# Patient Record
Sex: MALE | Race: ASIAN | NOT HISPANIC OR LATINO
[De-identification: names, ages, dates, MRNs, and addresses within clinical notes are randomized per-mention and may not be internally consistent; named-entity substitution may affect disease eponyms.]

---

## 2017-08-01 ENCOUNTER — FORM ENCOUNTER (OUTPATIENT)
Age: 52
End: 2017-08-01

## 2017-08-02 ENCOUNTER — APPOINTMENT (OUTPATIENT)
Dept: RHEUMATOLOGY | Facility: CLINIC | Age: 52
End: 2017-08-02
Payer: COMMERCIAL

## 2017-08-02 VITALS
SYSTOLIC BLOOD PRESSURE: 156 MMHG | BODY MASS INDEX: 25.59 KG/M2 | OXYGEN SATURATION: 98 % | DIASTOLIC BLOOD PRESSURE: 99 MMHG | HEART RATE: 74 BPM | HEIGHT: 67.5 IN | WEIGHT: 165 LBS | TEMPERATURE: 98.1 F

## 2017-08-02 DIAGNOSIS — R06.02 SHORTNESS OF BREATH: ICD-10-CM

## 2017-08-02 PROCEDURE — ZZZZZ: CPT

## 2017-08-02 PROCEDURE — 99215 OFFICE O/P EST HI 40 MIN: CPT

## 2017-08-03 ENCOUNTER — FORM ENCOUNTER (OUTPATIENT)
Age: 52
End: 2017-08-03

## 2017-08-04 ENCOUNTER — APPOINTMENT (OUTPATIENT)
Dept: CT IMAGING | Facility: CLINIC | Age: 52
End: 2017-08-04
Payer: COMMERCIAL

## 2017-08-04 ENCOUNTER — OUTPATIENT (OUTPATIENT)
Dept: OUTPATIENT SERVICES | Facility: HOSPITAL | Age: 52
LOS: 1 days | End: 2017-08-04
Payer: MEDICAID

## 2017-08-04 DIAGNOSIS — M31.30 WEGENER'S GRANULOMATOSIS WITHOUT RENAL INVOLVEMENT: ICD-10-CM

## 2017-08-04 LAB
ADJUSTED MITOGEN: >10 IU/ML
ADJUSTED TB AG: 0 IU/ML
ALBUMIN SERPL ELPH-MCNC: 4.4 G/DL
ALP BLD-CCNC: 68 U/L
ALT SERPL-CCNC: 22 U/L
ANION GAP SERPL CALC-SCNC: 15 MMOL/L
APPEARANCE: CLEAR
AST SERPL-CCNC: 25 U/L
BACTERIA: NEGATIVE
BASOPHILS # BLD AUTO: 0.05 K/UL
BASOPHILS NFR BLD AUTO: 0.5 %
BILIRUB SERPL-MCNC: 0.8 MG/DL
BILIRUBIN URINE: NEGATIVE
BLOOD URINE: NEGATIVE
BUN SERPL-MCNC: 21 MG/DL
CALCIUM SERPL-MCNC: 9.2 MG/DL
CD19 CELLS NFR BLD: 332 /UL
CELLS.CD3-CD19+/CELLS IN BLOOD: 13 %
CHLORIDE SERPL-SCNC: 103 MMOL/L
CO2 SERPL-SCNC: 23 MMOL/L
COLOR: YELLOW
CREAT SERPL-MCNC: 1.32 MG/DL
CREAT SPEC-SCNC: 338 MG/DL
CREAT/PROT UR: 0 RATIO
CRP SERPL-MCNC: <0.2 MG/DL
EOSINOPHIL # BLD AUTO: 0.73 K/UL
EOSINOPHIL NFR BLD AUTO: 7.9 %
ERYTHROCYTE [SEDIMENTATION RATE] IN BLOOD BY WESTERGREN METHOD: 19 MM/HR
GLUCOSE QUALITATIVE U: NORMAL MG/DL
GLUCOSE SERPL-MCNC: 87 MG/DL
HBV CORE IGG+IGM SER QL: NONREACTIVE
HBV CORE IGM SER QL: NONREACTIVE
HBV SURFACE AB SER QL: NONREACTIVE
HBV SURFACE AG SER QL: NONREACTIVE
HCT VFR BLD CALC: 45.6 %
HCV AB SER QL: NONREACTIVE
HCV S/CO RATIO: 0.07 S/CO
HGB BLD-MCNC: 15.2 G/DL
HYALINE CASTS: 4 /LPF
IMM GRANULOCYTES NFR BLD AUTO: 0.2 %
KETONES URINE: NEGATIVE
LEUKOCYTE ESTERASE URINE: NEGATIVE
LYMPHOCYTES # BLD AUTO: 2.19 K/UL
LYMPHOCYTES NFR BLD AUTO: 23.7 %
M TB IFN-G BLD-IMP: NEGATIVE
MAN DIFF?: NORMAL
MCHC RBC-ENTMCNC: 30.2 PG
MCHC RBC-ENTMCNC: 33.3 GM/DL
MCV RBC AUTO: 90.7 FL
MICROSCOPIC-UA: NORMAL
MONOCYTES # BLD AUTO: 0.63 K/UL
MONOCYTES NFR BLD AUTO: 6.8 %
MYELOPEROXIDASE AB SER QL IA: <5 UNITS
MYELOPEROXIDASE CELLS FLD QL: NEGATIVE
NEUTROPHILS # BLD AUTO: 5.62 K/UL
NEUTROPHILS NFR BLD AUTO: 60.9 %
NITRITE URINE: NEGATIVE
PH URINE: 6
PLATELET # BLD AUTO: 265 K/UL
POTASSIUM SERPL-SCNC: 4.3 MMOL/L
PROT SERPL-MCNC: 7.4 G/DL
PROT UR-MCNC: 13 MG/DL
PROTEIN URINE: ABNORMAL MG/DL
PROTEINASE3 AB SER IA-ACNC: 40.7 UNITS
PROTEINASE3 AB SER-ACNC: POSITIVE
QUANTIFERON GOLD NIL: 0.02 IU/ML
RBC # BLD: 5.03 M/UL
RBC # FLD: 13.5 %
RED BLOOD CELLS URINE: 3 /HPF
SODIUM SERPL-SCNC: 141 MMOL/L
SPECIFIC GRAVITY URINE: 1.02
SQUAMOUS EPITHELIAL CELLS: 2 /HPF
UROBILINOGEN URINE: NORMAL MG/DL
WBC # FLD AUTO: 9.24 K/UL
WHITE BLOOD CELLS URINE: 2 /HPF

## 2017-08-04 PROCEDURE — 82565 ASSAY OF CREATININE: CPT

## 2017-08-04 PROCEDURE — 71275 CT ANGIOGRAPHY CHEST: CPT | Mod: 26

## 2017-08-04 PROCEDURE — 71275 CT ANGIOGRAPHY CHEST: CPT

## 2017-09-15 ENCOUNTER — APPOINTMENT (OUTPATIENT)
Dept: NEUROLOGY | Facility: CLINIC | Age: 52
End: 2017-09-15
Payer: COMMERCIAL

## 2017-09-15 VITALS
HEIGHT: 67 IN | DIASTOLIC BLOOD PRESSURE: 81 MMHG | BODY MASS INDEX: 25.9 KG/M2 | SYSTOLIC BLOOD PRESSURE: 122 MMHG | HEART RATE: 80 BPM | WEIGHT: 165 LBS

## 2017-09-15 DIAGNOSIS — G62.9 POLYNEUROPATHY, UNSPECIFIED: ICD-10-CM

## 2017-09-15 PROCEDURE — 99204 OFFICE O/P NEW MOD 45 MIN: CPT

## 2017-09-19 ENCOUNTER — APPOINTMENT (OUTPATIENT)
Dept: NEUROLOGY | Facility: CLINIC | Age: 52
End: 2017-09-19
Payer: MEDICAID

## 2017-09-19 PROCEDURE — 95910 NRV CNDJ TEST 7-8 STUDIES: CPT

## 2017-09-19 PROCEDURE — 95886 MUSC TEST DONE W/N TEST COMP: CPT

## 2017-10-16 ENCOUNTER — APPOINTMENT (OUTPATIENT)
Dept: RHEUMATOLOGY | Facility: CLINIC | Age: 52
End: 2017-10-16
Payer: MEDICAID

## 2017-10-16 VITALS
WEIGHT: 168 LBS | BODY MASS INDEX: 26.37 KG/M2 | HEART RATE: 67 BPM | DIASTOLIC BLOOD PRESSURE: 78 MMHG | HEIGHT: 67 IN | TEMPERATURE: 98.2 F | OXYGEN SATURATION: 96 % | SYSTOLIC BLOOD PRESSURE: 127 MMHG

## 2017-10-16 DIAGNOSIS — R09.81 NASAL CONGESTION: ICD-10-CM

## 2017-10-16 LAB
ALBUMIN SERPL ELPH-MCNC: 4.4 G/DL
ALP BLD-CCNC: 63 U/L
ALT SERPL-CCNC: 13 U/L
ANION GAP SERPL CALC-SCNC: 13 MMOL/L
AST SERPL-CCNC: 20 U/L
BASOPHILS # BLD AUTO: 0.05 K/UL
BASOPHILS NFR BLD AUTO: 0.4 %
BILIRUB SERPL-MCNC: 0.8 MG/DL
BUN SERPL-MCNC: 20 MG/DL
CALCIUM SERPL-MCNC: 9.4 MG/DL
CHLORIDE SERPL-SCNC: 102 MMOL/L
CO2 SERPL-SCNC: 26 MMOL/L
CREAT SERPL-MCNC: 1.35 MG/DL
CREAT SPEC-SCNC: 189 MG/DL
CREAT/PROT UR: 0 RATIO
CRP SERPL-MCNC: 0.3 MG/DL
EOSINOPHIL # BLD AUTO: 1.24 K/UL
EOSINOPHIL NFR BLD AUTO: 8.7 %
ERYTHROCYTE [SEDIMENTATION RATE] IN BLOOD BY WESTERGREN METHOD: 16 MM/HR
GLUCOSE SERPL-MCNC: 94 MG/DL
HCT VFR BLD CALC: 42.4 %
HGB BLD-MCNC: 14.4 G/DL
IMM GRANULOCYTES NFR BLD AUTO: 0.1 %
LYMPHOCYTES # BLD AUTO: 2.27 K/UL
LYMPHOCYTES NFR BLD AUTO: 15.9 %
MAN DIFF?: NORMAL
MCHC RBC-ENTMCNC: 30.8 PG
MCHC RBC-ENTMCNC: 34 GM/DL
MCV RBC AUTO: 90.8 FL
MONOCYTES # BLD AUTO: 0.45 K/UL
MONOCYTES NFR BLD AUTO: 3.2 %
NEUTROPHILS # BLD AUTO: 10.22 K/UL
NEUTROPHILS NFR BLD AUTO: 71.7 %
PLATELET # BLD AUTO: 247 K/UL
POTASSIUM SERPL-SCNC: 4.5 MMOL/L
PROT SERPL-MCNC: 7.5 G/DL
PROT UR-MCNC: 6 MG/DL
RBC # BLD: 4.67 M/UL
RBC # FLD: 12.8 %
SODIUM SERPL-SCNC: 141 MMOL/L
WBC # FLD AUTO: 14.25 K/UL

## 2017-10-16 PROCEDURE — 90686 IIV4 VACC NO PRSV 0.5 ML IM: CPT

## 2017-10-16 PROCEDURE — G0008: CPT

## 2017-10-16 PROCEDURE — 99215 OFFICE O/P EST HI 40 MIN: CPT | Mod: 25

## 2017-10-16 RX ORDER — METOPROLOL TARTRATE 25 MG/1
25 TABLET, FILM COATED ORAL
Refills: 0 | Status: ACTIVE | COMMUNITY

## 2017-10-16 RX ORDER — LISINOPRIL 5 MG/1
5 TABLET ORAL
Refills: 0 | Status: ACTIVE | COMMUNITY

## 2017-10-19 ENCOUNTER — FORM ENCOUNTER (OUTPATIENT)
Age: 52
End: 2017-10-19

## 2017-10-19 LAB
APPEARANCE: CLEAR
BACTERIA: NEGATIVE
BILIRUBIN URINE: NEGATIVE
BLOOD URINE: NEGATIVE
COLOR: YELLOW
GLUCOSE QUALITATIVE U: NEGATIVE MG/DL
KETONES URINE: NEGATIVE
LEUKOCYTE ESTERASE URINE: NEGATIVE
MICROSCOPIC-UA: NORMAL
MYELOPEROXIDASE AB SER QL IA: <5 UNITS
MYELOPEROXIDASE CELLS FLD QL: NEGATIVE
NITRITE URINE: NEGATIVE
PH URINE: 5.5
PROTEIN URINE: NEGATIVE MG/DL
PROTEINASE3 AB SER IA-ACNC: 39 UNITS
PROTEINASE3 AB SER-ACNC: POSITIVE
RED BLOOD CELLS URINE: 0 /HPF
SPECIFIC GRAVITY URINE: 1.02
SQUAMOUS EPITHELIAL CELLS: 0 /HPF
UROBILINOGEN URINE: NEGATIVE MG/DL
WHITE BLOOD CELLS URINE: 0 /HPF

## 2017-10-20 ENCOUNTER — APPOINTMENT (OUTPATIENT)
Dept: MRI IMAGING | Facility: CLINIC | Age: 52
End: 2017-10-20

## 2017-10-20 ENCOUNTER — OUTPATIENT (OUTPATIENT)
Dept: OUTPATIENT SERVICES | Facility: HOSPITAL | Age: 52
LOS: 1 days | End: 2017-10-20
Payer: MEDICAID

## 2017-10-20 DIAGNOSIS — Z00.8 ENCOUNTER FOR OTHER GENERAL EXAMINATION: ICD-10-CM

## 2017-10-20 PROCEDURE — 70553 MRI BRAIN STEM W/O & W/DYE: CPT | Mod: 26

## 2017-10-20 PROCEDURE — 70543 MRI ORBT/FAC/NCK W/O &W/DYE: CPT

## 2017-10-20 PROCEDURE — 70553 MRI BRAIN STEM W/O & W/DYE: CPT

## 2017-10-20 PROCEDURE — 70543 MRI ORBT/FAC/NCK W/O &W/DYE: CPT | Mod: 26

## 2017-10-20 PROCEDURE — A9585: CPT

## 2017-10-24 ENCOUNTER — APPOINTMENT (OUTPATIENT)
Dept: RHEUMATOLOGY | Facility: CLINIC | Age: 52
End: 2017-10-24
Payer: MEDICAID

## 2017-10-24 VITALS
WEIGHT: 170 LBS | HEART RATE: 63 BPM | SYSTOLIC BLOOD PRESSURE: 139 MMHG | DIASTOLIC BLOOD PRESSURE: 85 MMHG | BODY MASS INDEX: 26.68 KG/M2 | TEMPERATURE: 98.3 F | HEIGHT: 67 IN | OXYGEN SATURATION: 98 %

## 2017-10-24 PROCEDURE — 99215 OFFICE O/P EST HI 40 MIN: CPT

## 2017-10-25 RX ORDER — RITUXIMAB 10 MG/ML
500 INJECTION, SOLUTION INTRAVENOUS
Qty: 1000 | Refills: 1 | Status: ACTIVE | OUTPATIENT
Start: 2017-10-24

## 2017-11-02 ENCOUNTER — APPOINTMENT (OUTPATIENT)
Dept: CV DIAGNOSITCS | Facility: HOSPITAL | Age: 52
End: 2017-11-02
Payer: MEDICAID

## 2017-11-02 ENCOUNTER — OUTPATIENT (OUTPATIENT)
Dept: OUTPATIENT SERVICES | Facility: HOSPITAL | Age: 52
LOS: 1 days | End: 2017-11-02

## 2017-11-02 DIAGNOSIS — I35.1 NONRHEUMATIC AORTIC (VALVE) INSUFFICIENCY: ICD-10-CM

## 2017-11-02 PROCEDURE — 93306 TTE W/DOPPLER COMPLETE: CPT | Mod: 26

## 2017-11-02 PROCEDURE — 93312 ECHO TRANSESOPHAGEAL: CPT | Mod: 26

## 2017-11-02 PROCEDURE — 76376 3D RENDER W/INTRP POSTPROCES: CPT | Mod: 26

## 2017-11-27 ENCOUNTER — APPOINTMENT (OUTPATIENT)
Dept: NEUROLOGY | Facility: CLINIC | Age: 52
End: 2017-11-27

## 2017-11-28 ENCOUNTER — APPOINTMENT (OUTPATIENT)
Dept: RHEUMATOLOGY | Facility: CLINIC | Age: 52
End: 2017-11-28
Payer: MEDICAID

## 2017-11-28 VITALS
TEMPERATURE: 97.9 F | OXYGEN SATURATION: 98 % | HEART RATE: 66 BPM | DIASTOLIC BLOOD PRESSURE: 79 MMHG | BODY MASS INDEX: 26.84 KG/M2 | SYSTOLIC BLOOD PRESSURE: 126 MMHG | HEIGHT: 67 IN | WEIGHT: 171 LBS

## 2017-11-28 PROCEDURE — 99215 OFFICE O/P EST HI 40 MIN: CPT

## 2017-11-28 RX ORDER — CLOPIDOGREL BISULFATE 75 MG/1
75 TABLET, FILM COATED ORAL
Qty: 30 | Refills: 1 | Status: ACTIVE | COMMUNITY
Start: 2017-11-28 | End: 1900-01-01

## 2017-12-04 ENCOUNTER — APPOINTMENT (OUTPATIENT)
Dept: RHEUMATOLOGY | Facility: CLINIC | Age: 52
End: 2017-12-04

## 2017-12-07 ENCOUNTER — LABORATORY RESULT (OUTPATIENT)
Age: 52
End: 2017-12-07

## 2017-12-07 ENCOUNTER — APPOINTMENT (OUTPATIENT)
Dept: RHEUMATOLOGY | Facility: CLINIC | Age: 52
End: 2017-12-07
Payer: MEDICAID

## 2017-12-07 PROCEDURE — 96375 TX/PRO/DX INJ NEW DRUG ADDON: CPT

## 2017-12-07 PROCEDURE — 96415 CHEMO IV INFUSION ADDL HR: CPT

## 2017-12-07 PROCEDURE — 96413 CHEMO IV INFUSION 1 HR: CPT

## 2017-12-07 PROCEDURE — 36415 COLL VENOUS BLD VENIPUNCTURE: CPT

## 2017-12-19 ENCOUNTER — LABORATORY RESULT (OUTPATIENT)
Age: 52
End: 2017-12-19

## 2017-12-19 ENCOUNTER — RX RENEWAL (OUTPATIENT)
Age: 52
End: 2017-12-19

## 2017-12-20 ENCOUNTER — APPOINTMENT (OUTPATIENT)
Dept: RHEUMATOLOGY | Facility: CLINIC | Age: 52
End: 2017-12-20
Payer: MEDICAID

## 2017-12-20 PROCEDURE — 36415 COLL VENOUS BLD VENIPUNCTURE: CPT

## 2017-12-20 PROCEDURE — 96415 CHEMO IV INFUSION ADDL HR: CPT

## 2017-12-20 PROCEDURE — 96413 CHEMO IV INFUSION 1 HR: CPT

## 2018-01-10 ENCOUNTER — APPOINTMENT (OUTPATIENT)
Dept: RHEUMATOLOGY | Facility: CLINIC | Age: 53
End: 2018-01-10

## 2018-01-24 ENCOUNTER — APPOINTMENT (OUTPATIENT)
Dept: RHEUMATOLOGY | Facility: CLINIC | Age: 53
End: 2018-01-24

## 2018-01-24 ENCOUNTER — RX RENEWAL (OUTPATIENT)
Age: 53
End: 2018-01-24

## 2018-01-24 RX ORDER — CALCIUM CARBONATE/VITAMIN D3 500MG-5MCG
500-5 TABLET ORAL
Qty: 180 | Refills: 0 | Status: ACTIVE | COMMUNITY
Start: 2017-12-19 | End: 1900-01-01

## 2018-01-30 ENCOUNTER — APPOINTMENT (OUTPATIENT)
Dept: RHEUMATOLOGY | Facility: CLINIC | Age: 53
End: 2018-01-30
Payer: MEDICAID

## 2018-01-30 VITALS
HEIGHT: 67 IN | TEMPERATURE: 97.7 F | OXYGEN SATURATION: 98 % | HEART RATE: 64 BPM | WEIGHT: 175 LBS | BODY MASS INDEX: 27.47 KG/M2 | SYSTOLIC BLOOD PRESSURE: 137 MMHG | DIASTOLIC BLOOD PRESSURE: 91 MMHG

## 2018-01-30 DIAGNOSIS — Z51.81 ENCOUNTER FOR THERAPEUTIC DRUG LVL MONITORING: ICD-10-CM

## 2018-01-30 DIAGNOSIS — M31.30 WEGENER'S GRANULOMATOSIS W/OUT RENAL INVOLVEMENT: ICD-10-CM

## 2018-01-30 DIAGNOSIS — J32.9 CHRONIC SINUSITIS, UNSPECIFIED: ICD-10-CM

## 2018-01-30 DIAGNOSIS — Z79.899 ENCOUNTER FOR THERAPEUTIC DRUG LVL MONITORING: ICD-10-CM

## 2018-01-30 DIAGNOSIS — M21.371 FOOT DROP, RIGHT FOOT: ICD-10-CM

## 2018-01-30 DIAGNOSIS — Z79.899 OTHER LONG TERM (CURRENT) DRUG THERAPY: ICD-10-CM

## 2018-01-30 PROCEDURE — 99214 OFFICE O/P EST MOD 30 MIN: CPT | Mod: GC

## 2018-01-31 PROBLEM — M21.371 RIGHT FOOT DROP: Status: ACTIVE | Noted: 2017-08-02

## 2018-01-31 LAB
ALBUMIN SERPL ELPH-MCNC: 4.5 G/DL
ALP BLD-CCNC: 53 U/L
ALT SERPL-CCNC: 16 U/L
ANION GAP SERPL CALC-SCNC: 14 MMOL/L
AST SERPL-CCNC: 21 U/L
BASOPHILS # BLD AUTO: 0.06 K/UL
BASOPHILS NFR BLD AUTO: 0.8 %
BILIRUB SERPL-MCNC: 0.6 MG/DL
BUN SERPL-MCNC: 18 MG/DL
CALCIUM SERPL-MCNC: 9.4 MG/DL
CD19 CELLS NFR BLD: 0 /UL
CELLS.CD3-CD19+/CELLS IN BLOOD: 0 %
CHLORIDE SERPL-SCNC: 100 MMOL/L
CO2 SERPL-SCNC: 26 MMOL/L
CREAT SERPL-MCNC: 1.24 MG/DL
CRP SERPL-MCNC: <0.2 MG/DL
DEPRECATED KAPPA LC FREE/LAMBDA SER: 0.86 RATIO
EOSINOPHIL # BLD AUTO: 0.41 K/UL
EOSINOPHIL NFR BLD AUTO: 5.2 %
ERYTHROCYTE [SEDIMENTATION RATE] IN BLOOD BY WESTERGREN METHOD: 9 MM/HR
GLUCOSE SERPL-MCNC: 100 MG/DL
HCT VFR BLD CALC: 43.6 %
HGB BLD-MCNC: 14.9 G/DL
IGA SER QL IEP: 235 MG/DL
IGG SER QL IEP: 1300 MG/DL
IGM SER QL IEP: 39 MG/DL
IMM GRANULOCYTES NFR BLD AUTO: 0.1 %
KAPPA LC CSF-MCNC: 1.23 MG/DL
KAPPA LC SERPL-MCNC: 1.06 MG/DL
LYMPHOCYTES # BLD AUTO: 2.08 K/UL
LYMPHOCYTES NFR BLD AUTO: 26.4 %
MAN DIFF?: NORMAL
MCHC RBC-ENTMCNC: 31.3 PG
MCHC RBC-ENTMCNC: 34.2 GM/DL
MCV RBC AUTO: 91.6 FL
MONOCYTES # BLD AUTO: 0.65 K/UL
MONOCYTES NFR BLD AUTO: 8.2 %
MYELOPEROXIDASE AB SER QL IA: <5 UNITS
MYELOPEROXIDASE CELLS FLD QL: NEGATIVE
NEUTROPHILS # BLD AUTO: 4.67 K/UL
NEUTROPHILS NFR BLD AUTO: 59.3 %
PLATELET # BLD AUTO: 229 K/UL
POTASSIUM SERPL-SCNC: 4.7 MMOL/L
PROT SERPL-MCNC: 7.5 G/DL
PROTEINASE3 AB SER IA-ACNC: 43 UNITS
PROTEINASE3 AB SER-ACNC: POSITIVE
RBC # BLD: 4.76 M/UL
RBC # FLD: 13.8 %
SODIUM SERPL-SCNC: 140 MMOL/L
WBC # FLD AUTO: 7.88 K/UL

## 2021-05-04 ENCOUNTER — TRANSCRIPTION ENCOUNTER (OUTPATIENT)
Age: 56
End: 2021-05-04

## 2023-05-23 ENCOUNTER — HOSPITAL ENCOUNTER (EMERGENCY)
Facility: HOSPITAL | Age: 58
Discharge: HOME | End: 2023-05-23
Attending: EMERGENCY MEDICINE
Payer: COMMERCIAL

## 2023-05-23 VITALS
HEART RATE: 100 BPM | OXYGEN SATURATION: 94 % | RESPIRATION RATE: 18 BRPM | SYSTOLIC BLOOD PRESSURE: 170 MMHG | WEIGHT: 182 LBS | DIASTOLIC BLOOD PRESSURE: 101 MMHG | BODY MASS INDEX: 28.56 KG/M2 | HEIGHT: 67 IN | TEMPERATURE: 98.1 F

## 2023-05-23 DIAGNOSIS — L03.011 FELON OF FINGER OF RIGHT HAND: Primary | ICD-10-CM

## 2023-05-23 PROCEDURE — 10061 I&D ABSCESS COMP/MULTIPLE: CPT | Performed by: EMERGENCY MEDICINE

## 2023-05-23 PROCEDURE — 99282 EMERGENCY DEPT VISIT SF MDM: CPT

## 2023-05-23 PROCEDURE — 99284 EMERGENCY DEPT VISIT MOD MDM: CPT | Performed by: EMERGENCY MEDICINE

## 2023-05-23 RX ORDER — CEPHALEXIN 500 MG/1
500 CAPSULE ORAL EVERY 6 HOURS SCHEDULED
Qty: 28 CAPSULE | Refills: 0 | Status: SHIPPED | OUTPATIENT
Start: 2023-05-23 | End: 2023-05-30

## 2023-05-23 RX ORDER — LIDOCAINE HYDROCHLORIDE 10 MG/ML
10 INJECTION, SOLUTION EPIDURAL; INFILTRATION; INTRACAUDAL; PERINEURAL ONCE
Status: COMPLETED | OUTPATIENT
Start: 2023-05-23 | End: 2023-05-23

## 2023-05-23 RX ORDER — CEPHALEXIN 250 MG/1
500 CAPSULE ORAL ONCE
Status: COMPLETED | OUTPATIENT
Start: 2023-05-23 | End: 2023-05-23

## 2023-05-23 RX ADMIN — CEPHALEXIN 500 MG: 250 CAPSULE ORAL at 21:39

## 2023-05-23 RX ADMIN — LIDOCAINE HYDROCHLORIDE 10 ML: 10 INJECTION, SOLUTION EPIDURAL; INFILTRATION; INTRACAUDAL at 20:08

## 2023-05-24 ENCOUNTER — TELEPHONE (OUTPATIENT)
Dept: OBGYN CLINIC | Facility: CLINIC | Age: 58
End: 2023-05-24

## 2023-05-24 NOTE — TELEPHONE ENCOUNTER
Hello,  Please advise if the following patient can be forced onto the schedule:      Call back #: [phone]    Insurance: Self pay (aware of self pay policy)     Reason for appointment: infection of finger right thumb     Requested doctor/location: Dr Romero @ [facility]       Thank you

## 2023-05-24 NOTE — DISCHARGE INSTRUCTIONS
You were seen today for an infection of the tip of your finger called kendal miner  The infected material was drained in the emergency department today  It is very important that your take your antibiotics as prescribed, soak the finger in arm water 15-20 min at least once daily, and follow up promptly with a hand specialist (using the information provided you today) to prevent worsening infection of the finger/hand    Please seek care  immediately if your symptoms worsen despite the above treatment

## 2023-05-25 ENCOUNTER — HOSPITAL ENCOUNTER (OUTPATIENT)
Dept: RADIOLOGY | Facility: HOSPITAL | Age: 58
End: 2023-05-25
Attending: STUDENT IN AN ORGANIZED HEALTH CARE EDUCATION/TRAINING PROGRAM

## 2023-05-25 ENCOUNTER — OFFICE VISIT (OUTPATIENT)
Dept: OBGYN CLINIC | Facility: CLINIC | Age: 58
End: 2023-05-25

## 2023-05-25 VITALS
HEIGHT: 67 IN | SYSTOLIC BLOOD PRESSURE: 146 MMHG | DIASTOLIC BLOOD PRESSURE: 96 MMHG | WEIGHT: 180.4 LBS | BODY MASS INDEX: 28.31 KG/M2 | HEART RATE: 79 BPM

## 2023-05-25 DIAGNOSIS — M10.9 GOUT OF RIGHT HAND, UNSPECIFIED CAUSE, UNSPECIFIED CHRONICITY: Primary | ICD-10-CM

## 2023-05-25 DIAGNOSIS — M79.641 RIGHT HAND PAIN: ICD-10-CM

## 2023-05-25 RX ORDER — IBUPROFEN 800 MG/1
TABLET ORAL EVERY 6 HOURS PRN
COMMUNITY

## 2023-05-25 RX ORDER — COLCHICINE 0.6 MG/1
0.6 TABLET ORAL DAILY
Qty: 14 TABLET | Refills: 0 | Status: SHIPPED | OUTPATIENT
Start: 2023-05-25

## 2023-05-25 NOTE — PROGRESS NOTES
ORTHOPAEDIC HAND, WRIST, AND ELBOW OFFICE  VISIT       ASSESSMENT/PLAN:      Diagnoses and all orders for this visit:    Gout of right hand, unspecified cause, unspecified chronicity  -     XR thumb right first digit-min 2v; Future  -     Wound culture and Gram stain  -     Incision and drain  -     colchicine (COLCRYS) 0 6 mg tablet; Take 1 tablet (0 6 mg total) by mouth daily    Other orders  -     ibuprofen (MOTRIN) 800 mg tablet; Take by mouth every 6 (six) hours as needed for mild pain        81-year-old male with right thumb suspected gout    · Digital block was performed in the office today and the thumb was milked of some fluid and a culture was taken as precaution  · X-rays were reviewed in the office today  · A 2 week prescription was provided for Colchicine however, the patient was advised this medication will need to be managed further by his PCP or a Rheumatologist    · Clinical images were taken and loaded into the pt's chart  · The patient was instructed to complete the antibiotics as prescribed  · He was instructed to perform hand soaks daily  The patient verbalized understanding of exam findings and treatment plan  We engaged in the shared decision-making process and treatment options were discussed at length with the patient  Surgical and conservative management discussed today along with risks and benefits  Follow Up:  1 week       To Do Next Visit:  Re-evaluation of current issue      Carli Amaya MD  Attending, Orthopaedic Surgery  Hand, Wrist, and Elbow Surgery  [address] [place] [address]    ____________________________________________________________________________________________________________________________________________      CHIEF COMPLAINT:  Chief Complaint   Patient presents with   • Right Thumb - Pain       SUBJECTIVE:  Patient is a 63 y o  RHD male who presents today for evaluation and treatment of right thumb pain   The patient states appx 4 weeks ago he developed pain redness and swelling about the thumb  He states he then noticed a white pump which he stuck a needle in and drained  He noted thick white substance that reminded him of tooth paste  Clinical image was reviewed on patient's phone  He states over the past two weeks the pain has been increased  The patient went to the ED on 5/23/23 where I&D was performed  He was discharged on Keflex  He states the finger continues to drain thick white substance  The patient has a history of gout and does have Colchicine however, the patient states he does not have insurance and does not have a PCP and this medication is old  He hs been performing warm soaks and Ibuprofen  He discontinued the Ibuprofen due to GI upset  I have personally reviewed all the relevant PMH, PSH, SH, FH, Medications and allergies      PAST MEDICAL HISTORY:  History reviewed  No pertinent past medical history  PAST SURGICAL HISTORY:  History reviewed  No pertinent surgical history  FAMILY HISTORY:  History reviewed  No pertinent family history  SOCIAL HISTORY:  Social History     Tobacco Use   • Smoking status: Every Day     Packs/day: 0 25     Types: Cigarettes   • Smokeless tobacco: Never   Vaping Use   • Vaping Use: Never used   Substance Use Topics   • Alcohol use: Never   • Drug use: Never       MEDICATIONS:    Current Outpatient Medications:   •  cephalexin (KEFLEX) 500 mg capsule, Take 1 capsule (500 mg total) by mouth every 6 (six) hours for 7 days, Disp: 28 capsule, Rfl: 0  •  ibuprofen (MOTRIN) 800 mg tablet, Take by mouth every 6 (six) hours as needed for mild pain, Disp: , Rfl:   •  colchicine (COLCRYS) 0 6 mg tablet, Take 1 tablet (0 6 mg total) by mouth daily, Disp: 14 tablet, Rfl: 0    ALLERGIES:  No Known Allergies        REVIEW OF SYSTEMS:  Musculoskeletal:        As noted in HPI  All other systems reviewed and are negative      VITALS:  Vitals:    05/25/23 0717   BP: 146/96   Pulse: 79       LABS:  HgA1c: No "results found for: \"HGBA1C\"  BMP: No results found for: \"BUN\", \"CALCIUM\", \"CL\", \"CO2\", \"CREATININE\", \"GLUCOSE\", \"K\", \"NA\"    _____________________________________________________  PHYSICAL EXAMINATION:  General: well developed and well nourished, alert, oriented times 3 and appears comfortable  Psychiatric: Normal  HEENT: Normocephalic, Atraumatic Trachea Midline, No torticollis  Pulmonary: No audible wheezing or respiratory distress   Abdomen/GI: Non tender, non distended   Cardiovascular: No pitting edema, 2+ radial pulse   Skin: No Masses, No Fluctuation, No Ulcerations  Neurovascular: Sensation Intact to the Median, Ulnar, Radial Nerve, Motor Intact to the Median, Ulnar, Radial Nerve and Pulses Intact  Musculoskeletal: Normal, except as noted in detailed exam and in HPI  MUSCULOSKELETAL EXAMINATION:  Right thumb  6 mm incision over ulnar aspect of thumb pulp, thick chalky white substance able to be expressed  Mild hyperemia to thumb tip  Moving thumb well      ___________________________________________________  STUDIES REVIEWED:  Xrays of the right thumb were reviewed in PACS by Dr Fidel Paredes and demonstrate no osseous abnormalities  PROCEDURES PERFORMED:  Nerve block    Date/Time: 5/25/2023 7:00 AM    Performed by: Merari Evangelista MD  Authorized by: Merari Evangelista MD  [place] Protocol:  Procedure performed by:  Risks and benefits: risks, benefits and alternatives were discussed  Consent given by: patient  Time out: Immediately prior to procedure a \"time out\" was called to verify the correct patient, procedure, equipment, support staff and site/side marked as required      Indications:     Indications:  Pain relief  Location:     Body area:  Upper extremity    Upper extremity nerve:  Digital (thumb)    Laterality:  Right  Pre-procedure details:     Skin preparation:  Alcohol  Skin anesthesia (see MAR for exact dosages):     Skin anesthesia method:  Local infiltration  Procedure details (see " MAR for exact dosages): Anesthetic injected:  Lidocaine 1% w/o epi  Post-procedure details:     Dressing:  Sterile dressing    Patient tolerance of procedure:   Tolerated well, no immediate complications           _____________________________________________________      Scribe Attestation    I,:  Marisel Mcrae MA am acting as a scribe while in the presence of the attending physician :       I,:  Danita Bennett MD personally performed the services described in this documentation    as scribed in my presence :

## 2023-05-28 LAB
BACTERIA WND AEROBE CULT: NO GROWTH
GRAM STN SPEC: NORMAL

## 2023-06-02 ENCOUNTER — OFFICE VISIT (OUTPATIENT)
Dept: OBGYN CLINIC | Facility: CLINIC | Age: 58
End: 2023-06-02

## 2023-06-02 VITALS
BODY MASS INDEX: 27.75 KG/M2 | HEIGHT: 67 IN | SYSTOLIC BLOOD PRESSURE: 138 MMHG | WEIGHT: 176.8 LBS | DIASTOLIC BLOOD PRESSURE: 85 MMHG | HEART RATE: 80 BPM

## 2023-06-02 DIAGNOSIS — M10.9 GOUT OF RIGHT HAND, UNSPECIFIED CAUSE, UNSPECIFIED CHRONICITY: Primary | ICD-10-CM

## 2023-06-02 NOTE — PROGRESS NOTES
ORTHOPAEDIC HAND, WRIST, AND ELBOW OFFICE  VISIT       ASSESSMENT/PLAN:      Diagnoses and all orders for this visit:    Gout of right hand, unspecified cause, unspecified chronicity        63 y o  male with right thumb suspected gout   Cultures reviewed, no growth, no bacteria or polys seen   Advised to obtain a PCP to further manage Colchicine   Clinical pictures were obtained for patients chart   Follow up in 10 days for clinical re-evaluation     The patient verbalized understanding of exam findings and treatment plan  We engaged in the shared decision-making process and treatment options were discussed at length with the patient  Surgical and conservative management discussed today along with risks and benefits  Follow Up:  10 days     To Do Next Visit:  Re-evaluation of current issue      Issac Willis MD  Attending, Orthopaedic Surgery  Hand, Wrist, and Elbow Surgery  [address] [place] [address]    ____________________________________________________________________________________________________________________________________________      CHIEF COMPLAINT:  Chief Complaint   Patient presents with   • Right Thumb - Follow-up       SUBJECTIVE:  Patient is a 63 y o  RHD male who presents today for follow up of right thumb suspected gout  A culture was performed at his last visit which demonstrated no growth, bacteria or polys seen  He completed the oral ABX  He is still taking the Colchicine  He has been soaking the finger  He notes he has not been able to express any fluid from the finger in 2 days  He notes pain has improved, he is now able to sleep  He feels the finger is still swollen  I have personally reviewed all the relevant PMH, PSH, SH, FH, Medications and allergies      PAST MEDICAL HISTORY:  History reviewed  No pertinent past medical history  PAST SURGICAL HISTORY:  History reviewed  No pertinent surgical history  FAMILY HISTORY:  History reviewed   No pertinent family "history  SOCIAL HISTORY:  Social History     Tobacco Use   • Smoking status: Every Day     Packs/day: 0 25     Types: Cigarettes   • Smokeless tobacco: Never   Vaping Use   • Vaping Use: Never used   Substance Use Topics   • Alcohol use: Never   • Drug use: Never       MEDICATIONS:    Current Outpatient Medications:   •  ibuprofen (MOTRIN) 800 mg tablet, Take by mouth every 6 (six) hours as needed for mild pain, Disp: , Rfl:   •  colchicine (COLCRYS) 0 6 mg tablet, Take 1 tablet (0 6 mg total) by mouth daily (Patient not taking: Reported on 6/2/2023), Disp: 14 tablet, Rfl: 0    ALLERGIES:  No Known Allergies        REVIEW OF SYSTEMS:  Musculoskeletal:        As noted in HPI  All other systems reviewed and are negative  VITALS:  Vitals:    06/02/23 0726   BP: 138/85   Pulse: 80       LABS:  HgA1c: No results found for: \"HGBA1C\"  BMP: No results found for: \"BUN\", \"CALCIUM\", \"CL\", \"CO2\", \"CREATININE\", \"GLUCOSE\", \"K\", \"NA\"    _____________________________________________________  PHYSICAL EXAMINATION:  General: well developed and well nourished, alert, oriented times 3 and appears comfortable  Psychiatric: Normal  HEENT: Normocephalic, Atraumatic Trachea Midline, No torticollis  Pulmonary: No audible wheezing or respiratory distress   Abdomen/GI: Non tender, non distended   Cardiovascular: No pitting edema, 2+ radial pulse   Skin: No masses, erythema, lacerations, fluctation, ulcerations  Neurovascular: Sensation Intact to the Median, Ulnar, Radial Nerve, Motor Intact to the Median, Ulnar, Radial Nerve and Pulses Intact  Musculoskeletal: Normal, except as noted in detailed exam and in HPI        MUSCULOSKELETAL EXAMINATION:    Right thumb:   No erythema or ecchymosis   Edema noted   Tenderness to the tip   6 mm healing incision over ulnar aspect of the thumb pulp   Mild hyperemia to the tip   Thumb is moving well     ___________________________________________________  STUDIES REVIEWED:  Wound culture and gram " stain reviewed: No growth   No polys or Bacteria seen          PROCEDURES PERFORMED:  Procedures  No Procedures performed today    _____________________________________________________      Dejah Gordon    I,:  Alayna Mcrae am acting as a scribe while in the presence of the attending physician :       I,:  Kia Lyon MD personally performed the services described in this documentation    as scribed in my presence :

## 2023-06-11 NOTE — ED PROVIDER NOTES
"History  Chief Complaint   Patient presents with   • Finger Pain     Right thumb swelling for 2 weeks, patient reports popping a blister to the medial right side of thumb and states \"I got a lot of white pus out of it\"  Patient presents with hardness to tip of right thumb at site of swelling and secondary white center to lateral right thumb  71-year-old male presenting to the emergency department for evaluation of finger pain  Patient had right thumb swelling for the past 2 weeks  Patient had a blister on the thumb that developed which he subsequently popped, and drained some purulent material   However he states that the distal fingertip of the thumb continues to have some swelling and pain  None       History reviewed  No pertinent past medical history  History reviewed  No pertinent surgical history  History reviewed  No pertinent family history  I have reviewed and agree with the history as documented  E-Cigarette/Vaping   • E-Cigarette Use Never User      E-Cigarette/Vaping Substances     Social History     Tobacco Use   • Smoking status: Every Day     Packs/day: 0 25     Types: Cigarettes   • Smokeless tobacco: Never   Vaping Use   • Vaping Use: Never used   Substance Use Topics   • Alcohol use: Never   • Drug use: Never       Review of Systems   Constitutional: Negative for appetite change, chills, fatigue and fever  HENT: Negative for sneezing and sore throat  Eyes: Negative for visual disturbance  Respiratory: Negative for cough, choking, chest tightness, shortness of breath and wheezing  Cardiovascular: Negative for chest pain and palpitations  Gastrointestinal: Negative for abdominal pain, constipation, diarrhea, nausea and vomiting  Genitourinary: Negative for difficulty urinating and dysuria  Neurological: Negative for dizziness, weakness, light-headedness, numbness and headaches  All other systems reviewed and are negative        Physical Exam  Physical " Exam  Vitals and nursing note reviewed  Constitutional:       General: He is not in acute distress  Appearance: He is well-developed  He is not diaphoretic  HENT:      Head: Normocephalic and atraumatic  Eyes:      Pupils: Pupils are equal, round, and reactive to light  Neck:      Vascular: No JVD  Trachea: No tracheal deviation  Cardiovascular:      Rate and Rhythm: Normal rate and regular rhythm  Heart sounds: Normal heart sounds  No murmur heard  No friction rub  No gallop  Pulmonary:      Effort: Pulmonary effort is normal  No respiratory distress  Breath sounds: Normal breath sounds  No wheezing or rales  Abdominal:      General: Bowel sounds are normal  There is no distension  Palpations: Abdomen is soft  Tenderness: There is no abdominal tenderness  There is no guarding or rebound  Skin:     General: Skin is warm and dry  Coloration: Skin is not pale  Comments: There is a felon of the pulp of the distal phalanx of the right thumb  Neurological:      Mental Status: He is alert and oriented to person, place, and time  Cranial Nerves: No cranial nerve deficit  Motor: No abnormal muscle tone     Psychiatric:         Behavior: Behavior normal          Vital Signs  ED Triage Vitals   Temperature Pulse Respirations Blood Pressure SpO2   05/23/23 1827 05/23/23 1827 05/23/23 1828 05/23/23 1827 05/23/23 1827   98 1 °F (36 7 °C) 100 18 (!) 170/101 94 %      Temp src Heart Rate Source Patient Position - Orthostatic VS BP Location FiO2 (%)   -- -- -- -- --             Pain Score       --                  Vitals:    05/23/23 1827   BP: (!) 170/101   Pulse: 100         Visual Acuity      ED Medications  Medications   lidocaine (PF) (XYLOCAINE-MPF) 1 % injection 10 mL (10 mL Infiltration Given by Other 5/23/23 2008)   cephalexin (KEFLEX) capsule 500 mg (500 mg Oral Given 5/23/23 2139)       Diagnostic Studies  Results Reviewed     None                 No orders to display              Procedures  Incision and drain    Date/Time: 5/23/2023 9:47 AM    Performed by: Carmen Harden MD  Authorized by: Carmen Harden MD    Patient location:  ED  Location:     Type:  Abscess    Location:  Upper extremity    Upper extremity location:  R thumb  Pre-procedure details:     Skin preparation:  Antiseptic wash  Anesthesia (see MAR for exact dosages): Anesthesia method: Digital block  Procedure details:     Complexity:  Simple    Incision types:  Stab incision    Scalpel blade:  11    Approach:  Open    Incision depth:  Subcutaneous    Wound management:  Probed and deloculated    Drainage:  Bloody and purulent    Drainage amount: Moderate    Wound treatment:  Wound left open    Packing materials:  None  Post-procedure details:     Patient tolerance of procedure: Tolerated well, no immediate complications             ED Course                               SBIRT 22yo+    Flowsheet Row Most Recent Value   Initial Alcohol Screen: US AUDIT-C     1  How often do you have a drink containing alcohol? 0 Filed at: 05/23/2023 1834   2  How many drinks containing alcohol do you have on a typical day you are drinking? 0 Filed at: 05/23/2023 1834   3a  Male UNDER 65: How often do you have five or more drinks on one occasion? 0 Filed at: 05/23/2023 1834   Audit-C Score 0 Filed at: 05/23/2023 0450   JASMINA: How many times in the past year have you    Used an illegal drug or used a prescription medication for non-medical reasons? Never Filed at: 05/23/2023 [address] Making  67-year-old male with a felon, incised and drained here, recommend antibiotics, soaks, prompt follow-up with hand surgery to minimize risk of aggression of infection to flexor tenosynovitis  Patient expresses understanding  Risk  Prescription drug management            Disposition  Final diagnoses:   Felon of finger of right hand     Time reflects when diagnosis was documented in both MDM as applicable and the Disposition within this note     Time User Action Codes Description Comment    5/23/2023  9:33 PM Ananda Garcia Add [C28 [id number]] Felon of finger of right hand       ED Disposition     ED Disposition   Discharge    Condition   Stable    Date/Time   Tue May 23, 2023  9:33 PM    Comment   Mario Kwok discharge to home/self care  Follow-up Information     Follow up With Specialties Details Why [address], MD Orthopedic Surgery, Hand Surgery   [place]  [phone]            Discharge Medication List as of 5/23/2023  9:43 PM      START taking these medications    Details   cephalexin (KEFLEX) 500 mg capsule Take 1 capsule (500 mg total) by mouth every 6 (six) hours for 7 days, Starting Tue 5/23/2023, Until Tue 5/30/2023, Normal             No discharge procedures on file      PDMP Review     None          ED Provider  Electronically Signed by           Ang Shook MD  06/11/23 7180

## 2023-06-22 ENCOUNTER — TELEPHONE (OUTPATIENT)
Dept: OBGYN CLINIC | Facility: HOSPITAL | Age: 58
End: 2023-06-22

## 2023-06-22 NOTE — TELEPHONE ENCOUNTER
Unfortunately Dr Berto Robledo is away this week and next  We can def try to get him in earlier upon Dr Arianna Gentile return   However, if any concerns, he should not wait to get evaluated and may need to go to urgent care/ED

## 2023-06-22 NOTE — TELEPHONE ENCOUNTER
Caller: Patient     Doctor: Jamarcus Tapia     Reason for call: Patient cancelled 6/9 appt and just called back today to reschedule  First available is 7/24, which he is now scheduled for  He states the doctor wanted to see him in 10 days for the infection, which he still has  He was seen on 6/2 for follow up and scheduled on 6/9 but cancelled that appt  He would like a sooner appt   Please advise    Call back#: [phone]

## 2025-01-01 ENCOUNTER — DOCUMENTATION (OUTPATIENT)
Age: 60
End: 2025-01-01

## 2025-01-01 ENCOUNTER — APPOINTMENT (EMERGENCY)
Dept: RADIOLOGY | Facility: HOSPITAL | Age: 60
End: 2025-01-01

## 2025-01-01 ENCOUNTER — TELEPHONE (OUTPATIENT)
Age: 60
End: 2025-01-01

## 2025-01-01 ENCOUNTER — APPOINTMENT (EMERGENCY)
Dept: CT IMAGING | Facility: HOSPITAL | Age: 60
End: 2025-01-01

## 2025-01-01 ENCOUNTER — TELEPHONE (OUTPATIENT)
Dept: PALLIATIVE MEDICINE | Facility: CLINIC | Age: 60
End: 2025-01-01

## 2025-01-01 ENCOUNTER — HOSPITAL ENCOUNTER (EMERGENCY)
Facility: HOSPITAL | Age: 60
End: 2025-03-18
Attending: EMERGENCY MEDICINE | Admitting: EMERGENCY MEDICINE

## 2025-01-01 ENCOUNTER — SOCIAL WORK (OUTPATIENT)
Dept: PALLIATIVE MEDICINE | Facility: CLINIC | Age: 60
End: 2025-01-01

## 2025-01-01 VITALS
RESPIRATION RATE: 23 BRPM | SYSTOLIC BLOOD PRESSURE: 93 MMHG | DIASTOLIC BLOOD PRESSURE: 55 MMHG | HEART RATE: 56 BPM | OXYGEN SATURATION: 87 %

## 2025-01-01 DIAGNOSIS — R52 ACUTE PAIN: ICD-10-CM

## 2025-01-01 DIAGNOSIS — M10.9 ACUTE GOUT INVOLVING TOE OF LEFT FOOT, UNSPECIFIED CAUSE: ICD-10-CM

## 2025-01-01 DIAGNOSIS — R09.02 HYPOXIA: ICD-10-CM

## 2025-01-01 DIAGNOSIS — N18.9 ACUTE ON CHRONIC RENAL FAILURE  (HCC): ICD-10-CM

## 2025-01-01 DIAGNOSIS — N17.9 ACUTE ON CHRONIC RENAL FAILURE  (HCC): ICD-10-CM

## 2025-01-01 DIAGNOSIS — R09.89 AIR HUNGER: ICD-10-CM

## 2025-01-01 DIAGNOSIS — Z71.89 COUNSELING AND COORDINATION OF CARE: Primary | ICD-10-CM

## 2025-01-01 DIAGNOSIS — E87.1 ACUTE HYPONATREMIA: ICD-10-CM

## 2025-01-01 DIAGNOSIS — I46.9 CARDIOPULMONARY ARREST (HCC): Primary | ICD-10-CM

## 2025-01-01 DIAGNOSIS — I50.9 MULTI-ORGAN FAILURE WITH HEART FAILURE (HCC): ICD-10-CM

## 2025-01-01 DIAGNOSIS — E87.20 LACTIC ACIDOSIS: ICD-10-CM

## 2025-01-01 LAB
ALBUMIN SERPL BCG-MCNC: 4 G/DL (ref 3.5–5)
ALP SERPL-CCNC: 194 U/L (ref 34–104)
ALT SERPL W P-5'-P-CCNC: 49 U/L (ref 7–52)
ANION GAP SERPL CALCULATED.3IONS-SCNC: 20 MMOL/L (ref 4–13)
APTT PPP: 28 SECONDS (ref 23–34)
AST SERPL W P-5'-P-CCNC: 46 U/L (ref 13–39)
BASOPHILS # BLD AUTO: 0.02 THOUSANDS/ÂΜL (ref 0–0.1)
BASOPHILS NFR BLD AUTO: 0 % (ref 0–1)
BILIRUB DIRECT SERPL-MCNC: 0.5 MG/DL (ref 0–0.2)
BILIRUB SERPL-MCNC: 1.57 MG/DL (ref 0.2–1)
BNP SERPL-MCNC: 1450 PG/ML (ref 0–100)
BUN SERPL-MCNC: 96 MG/DL (ref 5–25)
CALCIUM SERPL-MCNC: 8.6 MG/DL (ref 8.4–10.2)
CARDIAC TROPONIN I PNL SERPL HS: 908 NG/L (ref ?–50)
CHLORIDE SERPL-SCNC: 84 MMOL/L (ref 96–108)
CO2 SERPL-SCNC: 16 MMOL/L (ref 21–32)
CREAT SERPL-MCNC: 3.17 MG/DL (ref 0.6–1.3)
EOSINOPHIL # BLD AUTO: 0.02 THOUSAND/ÂΜL (ref 0–0.61)
EOSINOPHIL NFR BLD AUTO: 0 % (ref 0–6)
ERYTHROCYTE [DISTWIDTH] IN BLOOD BY AUTOMATED COUNT: 13.5 % (ref 11.6–15.1)
GFR SERPL CREATININE-BSD FRML MDRD: 20 ML/MIN/1.73SQ M
GLUCOSE SERPL-MCNC: 170 MG/DL (ref 65–140)
HCT VFR BLD AUTO: 39.7 % (ref 36.5–49.3)
HGB BLD-MCNC: 13 G/DL (ref 12–17)
IMM GRANULOCYTES # BLD AUTO: 0.2 THOUSAND/UL (ref 0–0.2)
IMM GRANULOCYTES NFR BLD AUTO: 2 % (ref 0–2)
INR PPP: 1.17 (ref 0.85–1.19)
LACTATE SERPL-SCNC: 7.1 MMOL/L (ref 0.5–2)
LIPASE SERPL-CCNC: 142 U/L (ref 11–82)
LYMPHOCYTES # BLD AUTO: 2.07 THOUSANDS/ÂΜL (ref 0.6–4.47)
LYMPHOCYTES NFR BLD AUTO: 15 % (ref 14–44)
MAGNESIUM SERPL-MCNC: 2.9 MG/DL (ref 1.9–2.7)
MCH RBC QN AUTO: 29.5 PG (ref 26.8–34.3)
MCHC RBC AUTO-ENTMCNC: 32.7 G/DL (ref 31.4–37.4)
MCV RBC AUTO: 90 FL (ref 82–98)
MONOCYTES # BLD AUTO: 0.71 THOUSAND/ÂΜL (ref 0.17–1.22)
MONOCYTES NFR BLD AUTO: 5 % (ref 4–12)
NEUTROPHILS # BLD AUTO: 10.76 THOUSANDS/ÂΜL (ref 1.85–7.62)
NEUTS SEG NFR BLD AUTO: 78 % (ref 43–75)
NRBC BLD AUTO-RTO: 0 /100 WBCS
PLATELET # BLD AUTO: 311 THOUSANDS/UL (ref 149–390)
PMV BLD AUTO: 11.5 FL (ref 8.9–12.7)
POTASSIUM SERPL-SCNC: 4.7 MMOL/L (ref 3.5–5.3)
PROT SERPL-MCNC: 7.1 G/DL (ref 6.4–8.4)
PROTHROMBIN TIME: 15.6 SECONDS (ref 12.3–15)
RBC # BLD AUTO: 4.4 MILLION/UL (ref 3.88–5.62)
SODIUM SERPL-SCNC: 120 MMOL/L (ref 135–147)
WBC # BLD AUTO: 13.78 THOUSAND/UL (ref 4.31–10.16)

## 2025-01-01 PROCEDURE — 83690 ASSAY OF LIPASE: CPT | Performed by: EMERGENCY MEDICINE

## 2025-01-01 PROCEDURE — 99285 EMERGENCY DEPT VISIT HI MDM: CPT

## 2025-01-01 PROCEDURE — 96376 TX/PRO/DX INJ SAME DRUG ADON: CPT

## 2025-01-01 PROCEDURE — 99292 CRITICAL CARE ADDL 30 MIN: CPT | Performed by: EMERGENCY MEDICINE

## 2025-01-01 PROCEDURE — 36415 COLL VENOUS BLD VENIPUNCTURE: CPT | Performed by: EMERGENCY MEDICINE

## 2025-01-01 PROCEDURE — 74176 CT ABD & PELVIS W/O CONTRAST: CPT

## 2025-01-01 PROCEDURE — 96365 THER/PROPH/DIAG IV INF INIT: CPT

## 2025-01-01 PROCEDURE — 31500 INSERT EMERGENCY AIRWAY: CPT

## 2025-01-01 PROCEDURE — 83880 ASSAY OF NATRIURETIC PEPTIDE: CPT | Performed by: EMERGENCY MEDICINE

## 2025-01-01 PROCEDURE — 85025 COMPLETE CBC W/AUTO DIFF WBC: CPT | Performed by: EMERGENCY MEDICINE

## 2025-01-01 PROCEDURE — 84484 ASSAY OF TROPONIN QUANT: CPT | Performed by: EMERGENCY MEDICINE

## 2025-01-01 PROCEDURE — NC001 PR NO CHARGE

## 2025-01-01 PROCEDURE — 92950 HEART/LUNG RESUSCITATION CPR: CPT | Performed by: ANESTHESIOLOGY

## 2025-01-01 PROCEDURE — 71250 CT THORAX DX C-: CPT

## 2025-01-01 PROCEDURE — 80076 HEPATIC FUNCTION PANEL: CPT | Performed by: EMERGENCY MEDICINE

## 2025-01-01 PROCEDURE — 31500 INSERT EMERGENCY AIRWAY: CPT | Performed by: EMERGENCY MEDICINE

## 2025-01-01 PROCEDURE — 93005 ELECTROCARDIOGRAM TRACING: CPT

## 2025-01-01 PROCEDURE — NC001 PR NO CHARGE: Performed by: ANESTHESIOLOGY

## 2025-01-01 PROCEDURE — 96375 TX/PRO/DX INJ NEW DRUG ADDON: CPT

## 2025-01-01 PROCEDURE — 71045 X-RAY EXAM CHEST 1 VIEW: CPT

## 2025-01-01 PROCEDURE — 80048 BASIC METABOLIC PNL TOTAL CA: CPT | Performed by: EMERGENCY MEDICINE

## 2025-01-01 PROCEDURE — 99291 CRITICAL CARE FIRST HOUR: CPT | Performed by: EMERGENCY MEDICINE

## 2025-01-01 PROCEDURE — 85730 THROMBOPLASTIN TIME PARTIAL: CPT | Performed by: EMERGENCY MEDICINE

## 2025-01-01 PROCEDURE — 83735 ASSAY OF MAGNESIUM: CPT | Performed by: EMERGENCY MEDICINE

## 2025-01-01 PROCEDURE — 92950 HEART/LUNG RESUSCITATION CPR: CPT

## 2025-01-01 PROCEDURE — 85610 PROTHROMBIN TIME: CPT | Performed by: EMERGENCY MEDICINE

## 2025-01-01 PROCEDURE — 83605 ASSAY OF LACTIC ACID: CPT | Performed by: EMERGENCY MEDICINE

## 2025-01-01 RX ORDER — CALCIUM CHLORIDE 100 MG/ML
SYRINGE (ML) INTRAVENOUS CODE/TRAUMA/SEDATION MEDICATION
Status: COMPLETED | OUTPATIENT
Start: 2025-01-01 | End: 2025-01-01

## 2025-01-01 RX ORDER — OXYCODONE HYDROCHLORIDE 5 MG/1
TABLET ORAL
Qty: 45 TABLET | Refills: 0 | OUTPATIENT
Start: 2025-01-01

## 2025-01-01 RX ORDER — AMIODARONE HYDROCHLORIDE 150 MG/3ML
INJECTION, SOLUTION INTRAVENOUS CODE/TRAUMA/SEDATION MEDICATION
Status: COMPLETED | OUTPATIENT
Start: 2025-01-01 | End: 2025-01-01

## 2025-01-01 RX ORDER — HYDROCORTISONE SODIUM SUCCINATE 100 MG/2ML
100 INJECTION INTRAMUSCULAR; INTRAVENOUS ONCE
Status: COMPLETED | OUTPATIENT
Start: 2025-01-01 | End: 2025-01-01

## 2025-01-01 RX ORDER — OXYCODONE HYDROCHLORIDE 5 MG/1
5-7.5 TABLET ORAL 3 TIMES DAILY PRN
Qty: 120 TABLET | Refills: 0 | Status: SHIPPED | OUTPATIENT
Start: 2025-01-01 | End: 2025-01-01

## 2025-01-01 RX ORDER — PREDNISONE 10 MG/1
10 TABLET ORAL DAILY
Qty: 5 TABLET | Refills: 0 | Status: SHIPPED | OUTPATIENT
Start: 2025-01-01 | End: 2025-03-19

## 2025-01-01 RX ORDER — OXYCODONE HYDROCHLORIDE 5 MG/1
5-7.5 TABLET ORAL 3 TIMES DAILY PRN
Qty: 120 TABLET | Refills: 0 | Status: SHIPPED | OUTPATIENT
Start: 2025-01-01 | End: 2025-03-19

## 2025-01-01 RX ORDER — FENTANYL CITRATE 50 UG/ML
50 INJECTION, SOLUTION INTRAMUSCULAR; INTRAVENOUS ONCE
Refills: 0 | Status: COMPLETED | OUTPATIENT
Start: 2025-01-01 | End: 2025-01-01

## 2025-01-01 RX ORDER — ATROPINE SULFATE 0.1 MG/ML
INJECTION, SOLUTION ENDOTRACHEAL; INTRAMUSCULAR; INTRAVENOUS; SUBCUTANEOUS CODE/TRAUMA/SEDATION MEDICATION
Status: COMPLETED | OUTPATIENT
Start: 2025-01-01 | End: 2025-01-01

## 2025-01-01 RX ORDER — ONDANSETRON 2 MG/ML
4 INJECTION INTRAMUSCULAR; INTRAVENOUS ONCE
Status: COMPLETED | OUTPATIENT
Start: 2025-01-01 | End: 2025-01-01

## 2025-01-01 RX ORDER — SODIUM BICARBONATE 84 MG/ML
INJECTION, SOLUTION INTRAVENOUS CODE/TRAUMA/SEDATION MEDICATION
Status: COMPLETED | OUTPATIENT
Start: 2025-01-01 | End: 2025-01-01

## 2025-01-01 RX ORDER — FENTANYL CITRATE 50 UG/ML
INJECTION, SOLUTION INTRAMUSCULAR; INTRAVENOUS
Status: COMPLETED
Start: 2025-01-01 | End: 2025-01-01

## 2025-01-01 RX ORDER — ETOMIDATE 2 MG/ML
INJECTION INTRAVENOUS CODE/TRAUMA/SEDATION MEDICATION
Status: COMPLETED | OUTPATIENT
Start: 2025-01-01 | End: 2025-01-01

## 2025-01-01 RX ORDER — SUCCINYLCHOLINE/SOD CL,ISO/PF 100 MG/5ML
SYRINGE (ML) INTRAVENOUS CODE/TRAUMA/SEDATION MEDICATION
Status: COMPLETED | OUTPATIENT
Start: 2025-01-01 | End: 2025-01-01

## 2025-01-01 RX ORDER — EPINEPHRINE 0.1 MG/ML
INJECTION INTRAVENOUS CODE/TRAUMA/SEDATION MEDICATION
Status: COMPLETED | OUTPATIENT
Start: 2025-01-01 | End: 2025-01-01

## 2025-01-01 RX ADMIN — EPINEPHRINE 1 MG: 0.1 INJECTION INTRAVENOUS at 15:23

## 2025-01-01 RX ADMIN — EPINEPHRINE 1 MG: 0.1 INJECTION INTRAVENOUS at 15:29

## 2025-01-01 RX ADMIN — CALCIUM CHLORIDE 1 G: 100 INJECTION PARENTERAL at 15:25

## 2025-01-01 RX ADMIN — Medication 50 MG: at 15:22

## 2025-01-01 RX ADMIN — FENTANYL CITRATE 25 MCG: 50 INJECTION, SOLUTION INTRAMUSCULAR; INTRAVENOUS at 19:03

## 2025-01-01 RX ADMIN — FENTANYL CITRATE 50 MCG: 50 INJECTION INTRAMUSCULAR; INTRAVENOUS at 14:50

## 2025-01-01 RX ADMIN — EPINEPHRINE 1 MG: 0.1 INJECTION INTRAVENOUS at 15:26

## 2025-01-01 RX ADMIN — ATROPINE SULFATE 0.5 MG: 0.1 INJECTION, SOLUTION ENDOTRACHEAL; INTRAMUSCULAR; INTRAVENOUS; SUBCUTANEOUS at 15:42

## 2025-01-01 RX ADMIN — SODIUM BICARBONATE 50 MEQ: 84 INJECTION, SOLUTION INTRAVENOUS at 15:42

## 2025-01-01 RX ADMIN — Medication 100 MG: at 15:20

## 2025-01-01 RX ADMIN — EPINEPHRINE 1 MG: 0.1 INJECTION INTRAVENOUS at 15:34

## 2025-01-01 RX ADMIN — ETOMIDATE 20 MG: 2 INJECTION, SOLUTION INTRAVENOUS at 15:21

## 2025-01-01 RX ADMIN — ONDANSETRON 4 MG: 2 INJECTION INTRAMUSCULAR; INTRAVENOUS at 14:49

## 2025-01-01 RX ADMIN — FENTANYL CITRATE 25 MCG: 50 INJECTION INTRAMUSCULAR; INTRAVENOUS at 19:03

## 2025-01-01 RX ADMIN — EPINEPHRINE 1 MG: 0.1 INJECTION INTRAVENOUS at 15:42

## 2025-01-01 RX ADMIN — AMIODARONE HYDROCHLORIDE 300 MG: 50 INJECTION, SOLUTION INTRAVENOUS at 15:40

## 2025-01-01 RX ADMIN — NOREPINEPHRINE BITARTRATE 2 MCG/MIN: 1 SOLUTION INTRAVENOUS at 15:05

## 2025-01-01 RX ADMIN — EPINEPHRINE 1 MG: 0.1 INJECTION INTRAVENOUS at 15:20

## 2025-01-01 RX ADMIN — HYDROCORTISONE SODIUM SUCCINATE 100 MG: 100 INJECTION, POWDER, FOR SOLUTION INTRAMUSCULAR; INTRAVENOUS at 15:15

## 2025-01-01 RX ADMIN — EPINEPHRINE 1 MG: 0.1 INJECTION INTRAVENOUS at 15:31

## 2025-01-01 RX ADMIN — SODIUM CHLORIDE 500 ML: 0.9 INJECTION, SOLUTION INTRAVENOUS at 15:06

## 2025-03-02 ENCOUNTER — APPOINTMENT (EMERGENCY)
Dept: RADIOLOGY | Facility: HOSPITAL | Age: 60
End: 2025-03-02
Payer: COMMERCIAL

## 2025-03-02 ENCOUNTER — HOSPITAL ENCOUNTER (EMERGENCY)
Facility: HOSPITAL | Age: 60
End: 2025-03-02
Attending: EMERGENCY MEDICINE | Admitting: EMERGENCY MEDICINE
Payer: COMMERCIAL

## 2025-03-02 ENCOUNTER — APPOINTMENT (EMERGENCY)
Dept: NON INVASIVE DIAGNOSTICS | Facility: HOSPITAL | Age: 60
End: 2025-03-02
Payer: COMMERCIAL

## 2025-03-02 ENCOUNTER — APPOINTMENT (EMERGENCY)
Dept: CT IMAGING | Facility: HOSPITAL | Age: 60
End: 2025-03-02
Payer: COMMERCIAL

## 2025-03-02 ENCOUNTER — APPOINTMENT (INPATIENT)
Dept: RADIOLOGY | Facility: HOSPITAL | Age: 60
DRG: 174 | End: 2025-03-02
Payer: COMMERCIAL

## 2025-03-02 ENCOUNTER — HOSPITAL ENCOUNTER (INPATIENT)
Facility: HOSPITAL | Age: 60
LOS: 9 days | Discharge: HOME | DRG: 174 | End: 2025-03-11
Attending: STUDENT IN AN ORGANIZED HEALTH CARE EDUCATION/TRAINING PROGRAM | Admitting: STUDENT IN AN ORGANIZED HEALTH CARE EDUCATION/TRAINING PROGRAM
Payer: COMMERCIAL

## 2025-03-02 VITALS
SYSTOLIC BLOOD PRESSURE: 95 MMHG | OXYGEN SATURATION: 100 % | TEMPERATURE: 97.9 F | DIASTOLIC BLOOD PRESSURE: 69 MMHG | HEART RATE: 92 BPM | HEIGHT: 67 IN | RESPIRATION RATE: 19 BRPM | WEIGHT: 175 LBS | BODY MASS INDEX: 27.47 KG/M2

## 2025-03-02 DIAGNOSIS — N17.9 ACUTE KIDNEY INJURY (HCC): ICD-10-CM

## 2025-03-02 DIAGNOSIS — M31.31 WEGENER'S GRANULOMATOSIS WITH RENAL INVOLVEMENT (HCC): ICD-10-CM

## 2025-03-02 DIAGNOSIS — R11.2 NAUSEA & VOMITING: ICD-10-CM

## 2025-03-02 DIAGNOSIS — R57.0 CARDIOGENIC SHOCK (HCC): Primary | ICD-10-CM

## 2025-03-02 DIAGNOSIS — I20.0 UNSTABLE ANGINA (HCC): ICD-10-CM

## 2025-03-02 DIAGNOSIS — I25.110 CORONARY ARTERY DISEASE INVOLVING NATIVE CORONARY ARTERY OF NATIVE HEART WITH UNSTABLE ANGINA PECTORIS (HCC): ICD-10-CM

## 2025-03-02 DIAGNOSIS — R07.9 CHEST PAIN: ICD-10-CM

## 2025-03-02 DIAGNOSIS — I34.0 SEVERE MITRAL REGURGITATION: ICD-10-CM

## 2025-03-02 DIAGNOSIS — M10.9 GOUT: ICD-10-CM

## 2025-03-02 DIAGNOSIS — I50.9 NEW ONSET OF CONGESTIVE HEART FAILURE (HCC): ICD-10-CM

## 2025-03-02 DIAGNOSIS — I95.9 HYPOTENSION: ICD-10-CM

## 2025-03-02 DIAGNOSIS — I35.1 MODERATE AORTIC INSUFFICIENCY: ICD-10-CM

## 2025-03-02 PROBLEM — R79.89 ELEVATED SERUM CREATININE: Status: ACTIVE | Noted: 2025-03-02

## 2025-03-02 PROBLEM — R79.89 ELEVATED SERUM CREATININE: Status: RESOLVED | Noted: 2025-03-02 | Resolved: 2025-03-02

## 2025-03-02 LAB
2HR DELTA HS TROPONIN: 44 NG/L
4HR DELTA HS TROPONIN: 375 NG/L
ALBUMIN SERPL BCG-MCNC: 4 G/DL (ref 3.5–5)
ALP SERPL-CCNC: 47 U/L (ref 34–104)
ALT SERPL W P-5'-P-CCNC: 37 U/L (ref 7–52)
ANION GAP SERPL CALCULATED.3IONS-SCNC: 10 MMOL/L (ref 4–13)
ANION GAP SERPL CALCULATED.3IONS-SCNC: 10 MMOL/L (ref 4–13)
ANION GAP SERPL CALCULATED.3IONS-SCNC: 13 MMOL/L (ref 4–13)
AORTIC ROOT: 4.2 CM
AORTIC VALVE MEAN VELOCITY: 10.8 M/S
APTT PPP: 102 SECONDS (ref 23–34)
APTT PPP: 27 SECONDS (ref 23–34)
APTT PPP: 44 SECONDS (ref 23–34)
ASCENDING AORTA: 3.5 CM
AST SERPL W P-5'-P-CCNC: 29 U/L (ref 13–39)
ATRIAL RATE: 83 BPM
ATRIAL RATE: 87 BPM
ATRIAL RATE: 89 BPM
AV AREA BY CONTINUOUS VTI: 2.1 CM2
AV AREA PEAK VELOCITY: 2 CM2
AV LVOT MEAN GRADIENT: 2 MMHG
AV LVOT PEAK GRADIENT: 4 MMHG
AV MEAN PRESS GRAD SYS DOP V1V2: 5 MMHG
AV ORIFICE AREA US: 2.1 CM2
AV PEAK GRADIENT: 9 MMHG
AV VELOCITY RATIO: 0.67
AV VMAX SYS DOP: 1.51 M/S
BASE EX.OXY STD BLDV CALC-SCNC: 52.6 % (ref 60–80)
BASE EX.OXY STD BLDV CALC-SCNC: 57.8 % (ref 60–80)
BASE EX.OXY STD BLDV CALC-SCNC: 59.9 % (ref 60–80)
BASE EX.OXY STD BLDV CALC-SCNC: 62.1 % (ref 60–80)
BASE EXCESS BLDA CALC-SCNC: -8 MMOL/L
BASE EXCESS BLDV CALC-SCNC: -3.1 MMOL/L
BASE EXCESS BLDV CALC-SCNC: -4.7 MMOL/L
BASE EXCESS BLDV CALC-SCNC: -7.1 MMOL/L
BASE EXCESS BLDV CALC-SCNC: -7.2 MMOL/L
BASOPHILS # BLD AUTO: 0.07 THOUSANDS/ÂΜL (ref 0–0.1)
BASOPHILS NFR BLD AUTO: 1 % (ref 0–1)
BILIRUB SERPL-MCNC: 1.33 MG/DL (ref 0.2–1)
BNP SERPL-MCNC: 705 PG/ML (ref 0–100)
BODY TEMPERATURE: 97.9 DEGREES FEHRENHEIT
BODY TEMPERATURE: 98.1 DEGREES FEHRENHEIT
BODY TEMPERATURE: 98.4 DEGREES FEHRENHEIT
BODY TEMPERATURE: 98.6 DEGREES FEHRENHEIT
BSA FOR ECHO PROCEDURE: 1.91 M2
BUN SERPL-MCNC: 24 MG/DL (ref 5–25)
BUN SERPL-MCNC: 25 MG/DL (ref 5–25)
BUN SERPL-MCNC: 27 MG/DL (ref 5–25)
CALCIUM SERPL-MCNC: 7.9 MG/DL (ref 8.4–10.2)
CALCIUM SERPL-MCNC: 8.3 MG/DL (ref 8.4–10.2)
CALCIUM SERPL-MCNC: 9.2 MG/DL (ref 8.4–10.2)
CARDIAC TROPONIN I PNL SERPL HS: 110 NG/L (ref ?–50)
CARDIAC TROPONIN I PNL SERPL HS: 3013 NG/L (ref 8–18)
CARDIAC TROPONIN I PNL SERPL HS: 441 NG/L (ref ?–50)
CARDIAC TROPONIN I PNL SERPL HS: 66 NG/L (ref ?–50)
CHLORIDE SERPL-SCNC: 106 MMOL/L (ref 96–108)
CHLORIDE SERPL-SCNC: 106 MMOL/L (ref 96–108)
CHLORIDE SERPL-SCNC: 109 MMOL/L (ref 96–108)
CO2 SERPL-SCNC: 21 MMOL/L (ref 21–32)
CO2 SERPL-SCNC: 21 MMOL/L (ref 21–32)
CO2 SERPL-SCNC: 25 MMOL/L (ref 21–32)
CREAT SERPL-MCNC: 1.38 MG/DL (ref 0.6–1.3)
CREAT SERPL-MCNC: 1.65 MG/DL (ref 0.6–1.3)
CREAT SERPL-MCNC: 1.65 MG/DL (ref 0.6–1.3)
DOP CALC AO VTI: 22.7 CM
DOP CALC LVOT AREA: 3.14 CM2
DOP CALC LVOT CARDIAC INDEX: 2.7 L/MIN/M2
DOP CALC LVOT CARDIAC OUTPUT: 5.15 L/MIN
DOP CALC LVOT DIAMETER: 2 CM
DOP CALC LVOT PEAK VEL VTI: 15.2 CM
DOP CALC LVOT PEAK VEL: 0.95 M/S
DOP CALC LVOT STROKE INDEX: 25.1 ML/M2
DOP CALC LVOT STROKE VOLUME: 47.73
E WAVE DECELERATION TIME: 150 MS
EOSINOPHIL # BLD AUTO: 0.41 THOUSAND/ÂΜL (ref 0–0.61)
EOSINOPHIL NFR BLD AUTO: 3 % (ref 0–6)
ERYTHROCYTE [DISTWIDTH] IN BLOOD BY AUTOMATED COUNT: 13.6 % (ref 11.6–15.1)
FRACTIONAL SHORTENING: 12 (ref 28–44)
GFR SERPL CREATININE-BSD FRML MDRD: 44 ML/MIN/1.73SQ M
GFR SERPL CREATININE-BSD FRML MDRD: 44 ML/MIN/1.73SQ M
GFR SERPL CREATININE-BSD FRML MDRD: 55 ML/MIN/1.73SQ M
GLUCOSE SERPL-MCNC: 113 MG/DL (ref 65–140)
GLUCOSE SERPL-MCNC: 121 MG/DL (ref 65–140)
GLUCOSE SERPL-MCNC: 134 MG/DL (ref 65–140)
HCO3 BLDA-SCNC: 16.1 MMOL/L (ref 22–28)
HCO3 BLDV-SCNC: 20 MMOL/L (ref 24–30)
HCO3 BLDV-SCNC: 20.1 MMOL/L (ref 24–30)
HCO3 BLDV-SCNC: 22.6 MMOL/L (ref 24–30)
HCO3 BLDV-SCNC: 23.3 MMOL/L (ref 24–30)
HCT VFR BLD AUTO: 49.4 % (ref 36.5–49.3)
HGB BLD-MCNC: 15.9 G/DL (ref 12–17)
IMM GRANULOCYTES # BLD AUTO: 0.04 THOUSAND/UL (ref 0–0.2)
IMM GRANULOCYTES NFR BLD AUTO: 0 % (ref 0–2)
INR PPP: 0.98 (ref 0.85–1.19)
INTERVENTRICULAR SEPTUM IN DIASTOLE (PARASTERNAL SHORT AXIS VIEW): 1.1 CM
INTERVENTRICULAR SEPTUM: 1.1 CM (ref 0.6–1.1)
LAAS-AP2: 21.1 CM2
LAAS-AP4: 20.3 CM2
LACTATE SERPL-SCNC: 2 MMOL/L (ref 0.5–2)
LACTATE SERPL-SCNC: 2.1 MMOL/L (ref 0.5–2)
LACTATE SERPL-SCNC: 2.4 MMOL/L (ref 0.5–2)
LEFT ATRIUM SIZE: 5.1 CM
LEFT ATRIUM VOLUME (MOD BIPLANE): 63 ML
LEFT ATRIUM VOLUME INDEX (MOD BIPLANE): 33 ML/M2
LEFT INTERNAL DIMENSION IN SYSTOLE: 5.7 CM (ref 2.1–4)
LEFT VENTRICLE DIASTOLIC VOLUME (MOD BIPLANE): 192 ML
LEFT VENTRICLE DIASTOLIC VOLUME INDEX (MOD BIPLANE): 100.5 ML/M2
LEFT VENTRICLE SYSTOLIC VOLUME (MOD BIPLANE): 159 ML
LEFT VENTRICLE SYSTOLIC VOLUME INDEX (MOD BIPLANE): 83.2 ML/M2
LEFT VENTRICULAR INTERNAL DIMENSION IN DIASTOLE: 6.5 CM (ref 3.5–6)
LEFT VENTRICULAR POSTERIOR WALL IN END DIASTOLE: 1 CM
LEFT VENTRICULAR STROKE VOLUME: 59 ML
LIPASE SERPL-CCNC: 32 U/L (ref 11–82)
LV EF BIPLANE MOD: 17 %
LV EF US.2D.A4C+ESTIMATED: 21 %
LVSV (TEICH): 59 ML
LYMPHOCYTES # BLD AUTO: 3.15 THOUSANDS/ÂΜL (ref 0.6–4.47)
LYMPHOCYTES NFR BLD AUTO: 24 % (ref 14–44)
MAGNESIUM SERPL-MCNC: 1.9 MG/DL (ref 1.9–2.7)
MCH RBC QN AUTO: 29.6 PG (ref 26.8–34.3)
MCHC RBC AUTO-ENTMCNC: 32.2 G/DL (ref 31.4–37.4)
MCV RBC AUTO: 92 FL (ref 82–98)
MONOCYTES # BLD AUTO: 0.89 THOUSAND/ÂΜL (ref 0.17–1.22)
MONOCYTES NFR BLD AUTO: 7 % (ref 4–12)
MV PEAK A VEL: 0 M/S
MV PEAK E VEL: 88 CM/S
MV STENOSIS PRESSURE HALF TIME: 43 MS
MV VALVE AREA P 1/2 METHOD: 5.12
NASAL CANNULA: 4
NASAL CANNULA: 6
NEUTROPHILS # BLD AUTO: 8.63 THOUSANDS/ÂΜL (ref 1.85–7.62)
NEUTS SEG NFR BLD AUTO: 65 % (ref 43–75)
NON VENT TYPE- NRB: 4
NRBC BLD AUTO-RTO: 0 /100 WBCS
O2 CT BLDA-SCNC: 21.7 ML/DL (ref 16–23)
O2 CT BLDV-SCNC: 12.1 ML/DL
O2 CT BLDV-SCNC: 13.3 ML/DL
O2 CT BLDV-SCNC: 13.3 ML/DL
O2 CT BLDV-SCNC: 14.1 ML/DL
OXYHGB MFR BLDA: 93.7 % (ref 94–97)
P AXIS: 50 DEGREES
P AXIS: 58 DEGREES
P AXIS: 61 DEGREES
PCO2 BLD: 46.8 MM HG (ref 42–50)
PCO2 BLDA: 30.1 MM HG (ref 36–44)
PCO2 BLDV: 45.6 MM HG (ref 42–50)
PCO2 BLDV: 46.3 MM HG (ref 42–50)
PCO2 BLDV: 47 MM HG (ref 42–50)
PCO2 BLDV: 50.2 MM HG (ref 42–50)
PH BLD: 7.25 [PH] (ref 7.3–7.4)
PH BLDA: 7.35 [PH] (ref 7.35–7.45)
PH BLDV: 7.25 [PH] (ref 7.3–7.4)
PH BLDV: 7.26 [PH] (ref 7.3–7.4)
PH BLDV: 7.27 [PH] (ref 7.3–7.4)
PH BLDV: 7.32 [PH] (ref 7.3–7.4)
PHOSPHATE SERPL-MCNC: 4.3 MG/DL (ref 2.7–4.5)
PLATELET # BLD AUTO: 256 THOUSANDS/UL (ref 149–390)
PMV BLD AUTO: 10.3 FL (ref 8.9–12.7)
PO2 BLDA: 123.8 MM HG (ref 75–129)
PO2 BLDV: 32.3 MM HG (ref 35–45)
PO2 BLDV: 35.3 MM HG (ref 35–45)
PO2 BLDV: 35.5 MM HG (ref 35–45)
PO2 BLDV: 35.9 MM HG (ref 35–45)
PO2 VENOUS TEMP CORRECTED: 35.2 MM HG (ref 35–45)
POTASSIUM SERPL-SCNC: 3.4 MMOL/L (ref 3.5–5.3)
POTASSIUM SERPL-SCNC: 3.9 MMOL/L (ref 3.5–5.3)
POTASSIUM SERPL-SCNC: 4.6 MMOL/L (ref 3.5–5.3)
PR INTERVAL: 192 MS
PR INTERVAL: 204 MS
PR INTERVAL: 214 MS
PROT SERPL-MCNC: 6.7 G/DL (ref 6.4–8.4)
PROTHROMBIN TIME: 13.7 SECONDS (ref 12.3–15)
QRS AXIS: -60 DEGREES
QRS AXIS: -64 DEGREES
QRS AXIS: -67 DEGREES
QRSD INTERVAL: 142 MS
QRSD INTERVAL: 164 MS
QRSD INTERVAL: 166 MS
QT INTERVAL: 456 MS
QT INTERVAL: 466 MS
QT INTERVAL: 484 MS
QTC INTERVAL: 554 MS
QTC INTERVAL: 560 MS
QTC INTERVAL: 568 MS
RA PRESSURE ESTIMATED: 15 MMHG
RBC # BLD AUTO: 5.38 MILLION/UL (ref 3.88–5.62)
RIGHT ATRIUM AREA SYSTOLE A4C: 20.6 CM2
RIGHT VENTRICLE ID DIMENSION: 3.7 CM
RV PSP: 30 MMHG
SINOTUBULAR JUNCTION: 3 CM
SL CV LEFT ATRIUM LENGTH A2C: 5.5 CM
SL CV PED ECHO LEFT VENTRICLE DIASTOLIC VOLUME (MOD BIPLANE) 2D: 218 ML
SL CV PED ECHO LEFT VENTRICLE SYSTOLIC VOLUME (MOD BIPLANE) 2D: 160 ML
SODIUM SERPL-SCNC: 140 MMOL/L (ref 135–147)
SODIUM SERPL-SCNC: 140 MMOL/L (ref 135–147)
SODIUM SERPL-SCNC: 141 MMOL/L (ref 135–147)
SPECIMEN SOURCE: ABNORMAL
STJ: 3 CM
T WAVE AXIS: 23 DEGREES
T WAVE AXIS: 43 DEGREES
T WAVE AXIS: 76 DEGREES
TR MAX PG: 15 MMHG
TR PEAK VELOCITY: 2 M/S
TRICUSPID ANNULAR PLANE SYSTOLIC EXCURSION: 1.1 CM
TRICUSPID VALVE PEAK REGURGITATION VELOCITY: 1.95 M/S
VENTRICULAR RATE: 83 BPM
VENTRICULAR RATE: 87 BPM
VENTRICULAR RATE: 89 BPM
WBC # BLD AUTO: 13.19 THOUSAND/UL (ref 4.31–10.16)

## 2025-03-02 PROCEDURE — 83605 ASSAY OF LACTIC ACID: CPT | Performed by: EMERGENCY MEDICINE

## 2025-03-02 PROCEDURE — 80048 BASIC METABOLIC PNL TOTAL CA: CPT | Performed by: STUDENT IN AN ORGANIZED HEALTH CARE EDUCATION/TRAINING PROGRAM

## 2025-03-02 PROCEDURE — 36415 COLL VENOUS BLD VENIPUNCTURE: CPT | Performed by: EMERGENCY MEDICINE

## 2025-03-02 PROCEDURE — 83735 ASSAY OF MAGNESIUM: CPT | Performed by: PHYSICIAN ASSISTANT

## 2025-03-02 PROCEDURE — 93306 TTE W/DOPPLER COMPLETE: CPT

## 2025-03-02 PROCEDURE — 71045 X-RAY EXAM CHEST 1 VIEW: CPT

## 2025-03-02 PROCEDURE — 93005 ELECTROCARDIOGRAM TRACING: CPT

## 2025-03-02 PROCEDURE — 82805 BLOOD GASES W/O2 SATURATION: CPT | Performed by: PHYSICIAN ASSISTANT

## 2025-03-02 PROCEDURE — 83605 ASSAY OF LACTIC ACID: CPT | Performed by: PHYSICIAN ASSISTANT

## 2025-03-02 PROCEDURE — 02HV33Z INSERTION OF INFUSION DEVICE INTO SUPERIOR VENA CAVA, PERCUTANEOUS APPROACH: ICD-10-PCS | Performed by: STUDENT IN AN ORGANIZED HEALTH CARE EDUCATION/TRAINING PROGRAM

## 2025-03-02 PROCEDURE — 93010 ELECTROCARDIOGRAM REPORT: CPT | Performed by: INTERNAL MEDICINE

## 2025-03-02 PROCEDURE — 85025 COMPLETE CBC W/AUTO DIFF WBC: CPT | Performed by: EMERGENCY MEDICINE

## 2025-03-02 PROCEDURE — 84100 ASSAY OF PHOSPHORUS: CPT | Performed by: PHYSICIAN ASSISTANT

## 2025-03-02 PROCEDURE — 99285 EMERGENCY DEPT VISIT HI MDM: CPT | Performed by: EMERGENCY MEDICINE

## 2025-03-02 PROCEDURE — 80048 BASIC METABOLIC PNL TOTAL CA: CPT | Performed by: PHYSICIAN ASSISTANT

## 2025-03-02 PROCEDURE — 99223 1ST HOSP IP/OBS HIGH 75: CPT | Performed by: INTERNAL MEDICINE

## 2025-03-02 PROCEDURE — 96365 THER/PROPH/DIAG IV INF INIT: CPT

## 2025-03-02 PROCEDURE — C1894 INTRO/SHEATH, NON-LASER: HCPCS | Performed by: INTERNAL MEDICINE

## 2025-03-02 PROCEDURE — 85730 THROMBOPLASTIN TIME PARTIAL: CPT | Performed by: PHYSICIAN ASSISTANT

## 2025-03-02 PROCEDURE — 99285 EMERGENCY DEPT VISIT HI MDM: CPT

## 2025-03-02 PROCEDURE — 99152 MOD SED SAME PHYS/QHP 5/>YRS: CPT | Performed by: INTERNAL MEDICINE

## 2025-03-02 PROCEDURE — 84484 ASSAY OF TROPONIN QUANT: CPT | Performed by: PHYSICIAN ASSISTANT

## 2025-03-02 PROCEDURE — 71275 CT ANGIOGRAPHY CHEST: CPT

## 2025-03-02 PROCEDURE — 84484 ASSAY OF TROPONIN QUANT: CPT | Performed by: EMERGENCY MEDICINE

## 2025-03-02 PROCEDURE — 85730 THROMBOPLASTIN TIME PARTIAL: CPT | Performed by: EMERGENCY MEDICINE

## 2025-03-02 PROCEDURE — C1769 GUIDE WIRE: HCPCS | Performed by: INTERNAL MEDICINE

## 2025-03-02 PROCEDURE — 83880 ASSAY OF NATRIURETIC PEPTIDE: CPT | Performed by: STUDENT IN AN ORGANIZED HEALTH CARE EDUCATION/TRAINING PROGRAM

## 2025-03-02 PROCEDURE — 74174 CTA ABD&PLVS W/CONTRAST: CPT

## 2025-03-02 PROCEDURE — 96376 TX/PRO/DX INJ SAME DRUG ADON: CPT

## 2025-03-02 PROCEDURE — B2111ZZ FLUOROSCOPY OF MULTIPLE CORONARY ARTERIES USING LOW OSMOLAR CONTRAST: ICD-10-PCS | Performed by: INTERNAL MEDICINE

## 2025-03-02 PROCEDURE — 80053 COMPREHEN METABOLIC PANEL: CPT | Performed by: EMERGENCY MEDICINE

## 2025-03-02 PROCEDURE — 93306 TTE W/DOPPLER COMPLETE: CPT | Performed by: INTERNAL MEDICINE

## 2025-03-02 PROCEDURE — 93454 CORONARY ARTERY ANGIO S&I: CPT | Performed by: INTERNAL MEDICINE

## 2025-03-02 PROCEDURE — 83690 ASSAY OF LIPASE: CPT | Performed by: EMERGENCY MEDICINE

## 2025-03-02 PROCEDURE — 82805 BLOOD GASES W/O2 SATURATION: CPT | Performed by: STUDENT IN AN ORGANIZED HEALTH CARE EDUCATION/TRAINING PROGRAM

## 2025-03-02 PROCEDURE — 96375 TX/PRO/DX INJ NEW DRUG ADDON: CPT

## 2025-03-02 PROCEDURE — 99285 EMERGENCY DEPT VISIT HI MDM: CPT | Performed by: INTERNAL MEDICINE

## 2025-03-02 PROCEDURE — 96361 HYDRATE IV INFUSION ADD-ON: CPT

## 2025-03-02 PROCEDURE — 96366 THER/PROPH/DIAG IV INF ADDON: CPT

## 2025-03-02 PROCEDURE — 85610 PROTHROMBIN TIME: CPT | Performed by: EMERGENCY MEDICINE

## 2025-03-02 RX ORDER — HYDROMORPHONE HCL/PF 1 MG/ML
0.5 SYRINGE (ML) INJECTION EVERY 2 HOUR PRN
Status: DISCONTINUED | OUTPATIENT
Start: 2025-03-02 | End: 2025-03-08

## 2025-03-02 RX ORDER — HEPARIN SODIUM 1000 [USP'U]/ML
4000 INJECTION, SOLUTION INTRAVENOUS; SUBCUTANEOUS EVERY 6 HOURS PRN
Status: DISCONTINUED | OUTPATIENT
Start: 2025-03-02 | End: 2025-03-02 | Stop reason: HOSPADM

## 2025-03-02 RX ORDER — BISACODYL 10 MG
10 SUPPOSITORY, RECTAL RECTAL DAILY PRN
Status: DISCONTINUED | OUTPATIENT
Start: 2025-03-02 | End: 2025-03-07

## 2025-03-02 RX ORDER — HEPARIN SODIUM 1000 [USP'U]/ML
4000 INJECTION, SOLUTION INTRAVENOUS; SUBCUTANEOUS EVERY 6 HOURS PRN
Status: DISCONTINUED | OUTPATIENT
Start: 2025-03-02 | End: 2025-03-07

## 2025-03-02 RX ORDER — HEPARIN SODIUM 1000 [USP'U]/ML
2000 INJECTION, SOLUTION INTRAVENOUS; SUBCUTANEOUS EVERY 6 HOURS PRN
Status: DISCONTINUED | OUTPATIENT
Start: 2025-03-02 | End: 2025-03-07

## 2025-03-02 RX ORDER — ONDANSETRON 2 MG/ML
4 INJECTION INTRAMUSCULAR; INTRAVENOUS ONCE
Status: COMPLETED | OUTPATIENT
Start: 2025-03-02 | End: 2025-03-02

## 2025-03-02 RX ORDER — PREDNISONE 5 MG/1
5 TABLET ORAL
COMMUNITY
End: 2025-03-13

## 2025-03-02 RX ORDER — ATORVASTATIN CALCIUM 80 MG/1
80 TABLET, FILM COATED ORAL
Status: DISCONTINUED | OUTPATIENT
Start: 2025-03-02 | End: 2025-03-11 | Stop reason: HOSPADM

## 2025-03-02 RX ORDER — HYDROMORPHONE HCL IN WATER/PF 6 MG/30 ML
0.2 PATIENT CONTROLLED ANALGESIA SYRINGE INTRAVENOUS EVERY 2 HOUR PRN
Status: DISCONTINUED | OUTPATIENT
Start: 2025-03-02 | End: 2025-03-08

## 2025-03-02 RX ORDER — ASPIRIN 81 MG/1
81 TABLET, CHEWABLE ORAL DAILY
Status: DISCONTINUED | OUTPATIENT
Start: 2025-03-03 | End: 2025-03-02 | Stop reason: HOSPADM

## 2025-03-02 RX ORDER — ASPIRIN 81 MG/1
81 TABLET, CHEWABLE ORAL DAILY
Status: DISCONTINUED | OUTPATIENT
Start: 2025-03-02 | End: 2025-03-11 | Stop reason: HOSPADM

## 2025-03-02 RX ORDER — HYDROMORPHONE HCL IN WATER/PF 6 MG/30 ML
0.2 PATIENT CONTROLLED ANALGESIA SYRINGE INTRAVENOUS ONCE
Status: DISCONTINUED | OUTPATIENT
Start: 2025-03-02 | End: 2025-03-02

## 2025-03-02 RX ORDER — HYDROMORPHONE HCL/PF 1 MG/ML
SYRINGE (ML) INJECTION
Status: COMPLETED
Start: 2025-03-02 | End: 2025-03-02

## 2025-03-02 RX ORDER — PREDNISONE 10 MG/1
10 TABLET ORAL DAILY
COMMUNITY
End: 2025-03-13

## 2025-03-02 RX ORDER — MAGNESIUM SULFATE HEPTAHYDRATE 40 MG/ML
2 INJECTION, SOLUTION INTRAVENOUS ONCE
Status: COMPLETED | OUTPATIENT
Start: 2025-03-02 | End: 2025-03-02

## 2025-03-02 RX ORDER — ASPIRIN 325 MG
325 TABLET ORAL ONCE
Status: COMPLETED | OUTPATIENT
Start: 2025-03-02 | End: 2025-03-02

## 2025-03-02 RX ORDER — PREDNISONE 10 MG/1
10 TABLET ORAL
Status: DISCONTINUED | OUTPATIENT
Start: 2025-03-03 | End: 2025-03-11 | Stop reason: HOSPADM

## 2025-03-02 RX ORDER — HEPARIN SODIUM 1000 [USP'U]/ML
4000 INJECTION, SOLUTION INTRAVENOUS; SUBCUTANEOUS ONCE
Status: COMPLETED | OUTPATIENT
Start: 2025-03-02 | End: 2025-03-02

## 2025-03-02 RX ORDER — FAMOTIDINE 20 MG/1
20 TABLET, FILM COATED ORAL ONCE
Status: DISCONTINUED | OUTPATIENT
Start: 2025-03-02 | End: 2025-03-02

## 2025-03-02 RX ORDER — HEPARIN SODIUM 10000 [USP'U]/100ML
3-20 INJECTION, SOLUTION INTRAVENOUS
Status: DISCONTINUED | OUTPATIENT
Start: 2025-03-02 | End: 2025-03-02 | Stop reason: HOSPADM

## 2025-03-02 RX ORDER — AMOXICILLIN 250 MG
1 CAPSULE ORAL 2 TIMES DAILY
Status: DISCONTINUED | OUTPATIENT
Start: 2025-03-02 | End: 2025-03-11 | Stop reason: HOSPADM

## 2025-03-02 RX ORDER — HEPARIN SODIUM 1000 [USP'U]/ML
2000 INJECTION, SOLUTION INTRAVENOUS; SUBCUTANEOUS EVERY 6 HOURS PRN
Status: DISCONTINUED | OUTPATIENT
Start: 2025-03-02 | End: 2025-03-02 | Stop reason: HOSPADM

## 2025-03-02 RX ORDER — ONDANSETRON 2 MG/ML
4 INJECTION INTRAMUSCULAR; INTRAVENOUS EVERY 6 HOURS PRN
Status: DISCONTINUED | OUTPATIENT
Start: 2025-03-02 | End: 2025-03-11 | Stop reason: HOSPADM

## 2025-03-02 RX ORDER — CHLORHEXIDINE GLUCONATE ORAL RINSE 1.2 MG/ML
15 SOLUTION DENTAL EVERY 12 HOURS SCHEDULED
Status: DISCONTINUED | OUTPATIENT
Start: 2025-03-02 | End: 2025-03-09

## 2025-03-02 RX ORDER — FENTANYL CITRATE 50 UG/ML
INJECTION, SOLUTION INTRAMUSCULAR; INTRAVENOUS CODE/TRAUMA/SEDATION MEDICATION
Status: DISCONTINUED | OUTPATIENT
Start: 2025-03-02 | End: 2025-03-02 | Stop reason: HOSPADM

## 2025-03-02 RX ORDER — BUMETANIDE 0.25 MG/ML
0.5 INJECTION INTRAMUSCULAR; INTRAVENOUS CONTINUOUS
Status: DISCONTINUED | OUTPATIENT
Start: 2025-03-02 | End: 2025-03-04

## 2025-03-02 RX ORDER — HEPARIN SODIUM 10000 [USP'U]/100ML
3-20 INJECTION, SOLUTION INTRAVENOUS
Status: DISCONTINUED | OUTPATIENT
Start: 2025-03-02 | End: 2025-03-07

## 2025-03-02 RX ORDER — HYDROMORPHONE HCL/PF 1 MG/ML
0.5 SYRINGE (ML) INJECTION ONCE
Refills: 0 | Status: COMPLETED | OUTPATIENT
Start: 2025-03-02 | End: 2025-03-02

## 2025-03-02 RX ORDER — ATORVASTATIN CALCIUM 40 MG/1
80 TABLET, FILM COATED ORAL
Status: DISCONTINUED | OUTPATIENT
Start: 2025-03-02 | End: 2025-03-02 | Stop reason: HOSPADM

## 2025-03-02 RX ORDER — MILRINONE LACTATE 0.2 MG/ML
0.13 INJECTION, SOLUTION INTRAVENOUS CONTINUOUS
Status: DISCONTINUED | OUTPATIENT
Start: 2025-03-02 | End: 2025-03-09

## 2025-03-02 RX ORDER — LIDOCAINE HYDROCHLORIDE 10 MG/ML
INJECTION, SOLUTION EPIDURAL; INFILTRATION; INTRACAUDAL; PERINEURAL CODE/TRAUMA/SEDATION MEDICATION
Status: DISCONTINUED | OUTPATIENT
Start: 2025-03-02 | End: 2025-03-02 | Stop reason: HOSPADM

## 2025-03-02 RX ORDER — MAGNESIUM HYDROXIDE/ALUMINUM HYDROXICE/SIMETHICONE 120; 1200; 1200 MG/30ML; MG/30ML; MG/30ML
30 SUSPENSION ORAL ONCE
Status: DISCONTINUED | OUTPATIENT
Start: 2025-03-02 | End: 2025-03-02

## 2025-03-02 RX ORDER — FENTANYL CITRATE 50 UG/ML
75 INJECTION, SOLUTION INTRAMUSCULAR; INTRAVENOUS ONCE
Refills: 0 | Status: COMPLETED | OUTPATIENT
Start: 2025-03-02 | End: 2025-03-02

## 2025-03-02 RX ORDER — MIDAZOLAM HYDROCHLORIDE 2 MG/2ML
INJECTION, SOLUTION INTRAMUSCULAR; INTRAVENOUS CODE/TRAUMA/SEDATION MEDICATION
Status: DISCONTINUED | OUTPATIENT
Start: 2025-03-02 | End: 2025-03-02 | Stop reason: HOSPADM

## 2025-03-02 RX ORDER — BUMETANIDE 0.25 MG/ML
2 INJECTION, SOLUTION INTRAMUSCULAR; INTRAVENOUS ONCE
Status: COMPLETED | OUTPATIENT
Start: 2025-03-02 | End: 2025-03-02

## 2025-03-02 RX ORDER — PREDNISONE 5 MG/1
5 TABLET ORAL
Status: DISCONTINUED | OUTPATIENT
Start: 2025-03-02 | End: 2025-03-11 | Stop reason: HOSPADM

## 2025-03-02 RX ADMIN — CHLORHEXIDINE GLUCONATE 15 ML: 1.2 SOLUTION ORAL at 21:11

## 2025-03-02 RX ADMIN — IOHEXOL 100 ML: 350 INJECTION, SOLUTION INTRAVENOUS at 08:09

## 2025-03-02 RX ADMIN — PERFLUTREN 1 ML/MIN: 6.52 INJECTION, SUSPENSION INTRAVENOUS at 11:55

## 2025-03-02 RX ADMIN — FENTANYL CITRATE 75 MCG: 0.05 INJECTION, SOLUTION INTRAMUSCULAR; INTRAVENOUS at 07:14

## 2025-03-02 RX ADMIN — EPINEPHRINE 2 MCG/MIN: 1 INJECTION INTRAMUSCULAR; INTRAVENOUS; SUBCUTANEOUS at 14:38

## 2025-03-02 RX ADMIN — MAGNESIUM SULFATE HEPTAHYDRATE 2 G: 40 INJECTION, SOLUTION INTRAVENOUS at 19:21

## 2025-03-02 RX ADMIN — SODIUM CHLORIDE 1000 ML: 0.9 INJECTION, SOLUTION INTRAVENOUS at 07:09

## 2025-03-02 RX ADMIN — SODIUM CHLORIDE 1000 ML: 0.9 INJECTION, SOLUTION INTRAVENOUS at 08:30

## 2025-03-02 RX ADMIN — HYDROMORPHONE HYDROCHLORIDE 0.5 MG: 1 INJECTION, SOLUTION INTRAMUSCULAR; INTRAVENOUS; SUBCUTANEOUS at 07:50

## 2025-03-02 RX ADMIN — ONDANSETRON 4 MG: 2 INJECTION, SOLUTION INTRAMUSCULAR; INTRAVENOUS at 14:21

## 2025-03-02 RX ADMIN — NOREPINEPHRINE BITARTRATE 2 MCG/MIN: 1 INJECTION, SOLUTION, CONCENTRATE INTRAVENOUS at 14:38

## 2025-03-02 RX ADMIN — ATORVASTATIN CALCIUM 80 MG: 80 TABLET, FILM COATED ORAL at 16:21

## 2025-03-02 RX ADMIN — HEPARIN SODIUM 12 UNITS/KG/HR: 10000 INJECTION, SOLUTION INTRAVENOUS at 14:07

## 2025-03-02 RX ADMIN — Medication 2 MG/HR: at 16:01

## 2025-03-02 RX ADMIN — HEPARIN SODIUM 12 UNITS/KG/HR: 10000 INJECTION, SOLUTION INTRAVENOUS at 09:45

## 2025-03-02 RX ADMIN — BUMETANIDE 2 MG: 0.25 INJECTION INTRAMUSCULAR; INTRAVENOUS at 14:37

## 2025-03-02 RX ADMIN — PREDNISONE 5 MG: 10 TABLET ORAL at 21:12

## 2025-03-02 RX ADMIN — MILRINONE LACTATE IN DEXTROSE 0.25 MCG/KG/MIN: 200 INJECTION, SOLUTION INTRAVENOUS at 15:35

## 2025-03-02 RX ADMIN — HYDROMORPHONE HYDROCHLORIDE 0.5 MG: 1 INJECTION, SOLUTION INTRAMUSCULAR; INTRAVENOUS; SUBCUTANEOUS at 14:14

## 2025-03-02 RX ADMIN — MORPHINE SULFATE 2 MG: 2 INJECTION, SOLUTION INTRAMUSCULAR; INTRAVENOUS at 18:31

## 2025-03-02 RX ADMIN — ASPIRIN 325 MG ORAL TABLET 325 MG: 325 PILL ORAL at 07:50

## 2025-03-02 RX ADMIN — Medication 2 MG/HR: at 19:26

## 2025-03-02 RX ADMIN — TRIMETHOBENZAMIDE HYDROCHLORIDE 200 MG: 100 INJECTION INTRAMUSCULAR at 16:11

## 2025-03-02 RX ADMIN — HYDROMORPHONE HYDROCHLORIDE 0.5 MG: 1 INJECTION, SOLUTION INTRAMUSCULAR; INTRAVENOUS; SUBCUTANEOUS at 08:30

## 2025-03-02 RX ADMIN — HEPARIN SODIUM 4000 UNITS: 1000 INJECTION, SOLUTION INTRAVENOUS; SUBCUTANEOUS at 09:45

## 2025-03-02 RX ADMIN — ONDANSETRON 4 MG: 2 INJECTION INTRAMUSCULAR; INTRAVENOUS at 07:14

## 2025-03-02 RX ADMIN — SENNOSIDES AND DOCUSATE SODIUM 1 TABLET: 50; 8.6 TABLET ORAL at 21:12

## 2025-03-02 RX ADMIN — ONDANSETRON 4 MG: 2 INJECTION INTRAMUSCULAR; INTRAVENOUS at 09:45

## 2025-03-02 NOTE — ED PROVIDER NOTES
Time reflects when diagnosis was documented in both MDM as applicable and the Disposition within this note       Time User Action Codes Description Comment    3/2/2025  7:18 AM Erne, Ishaan Add [R07.9] Chest pain     3/2/2025  7:18 AM Erne, Ishaan Add [R11.2] Nausea & vomiting     3/2/2025 12:07 PM Erne, Ishaan Add [I50.9] New onset of congestive heart failure (HCC)     3/2/2025 12:07 PM Erne, Ishaan Add [I95.9] Hypotension     3/2/2025 12:07 PM Erne, Ishaan Add [R57.0] Cardiogenic shock (HCC)     3/2/2025 12:08 PM Erne, Ishaan Modify [R07.9] Chest pain     3/2/2025 12:08 PM Erne, Ishaan Modify [R57.0] Cardiogenic shock (HCC)           ED Disposition       ED Disposition   Transfer to Another Facility-In Network    Condition   --    Date/Time   Sun Mar 2, 2025 12:07 PM    Comment   Rodney Pizarro should be transferred out to [facility].               Assessment & Plan       Medical Decision Making  60-year-old male history of CAD presenting with chest pain.  Plan for cardiac evaluation including EKG troponin.  Chest x-ray.  Labs including abdominal labs.  Symptom management with IV pain and nausea medication plus fluids.  Reassess.    EKG interpreted by me with normal sinus rhythm and nonspecific ST abnormality with possible mild elevation inferiorly without reciprocal changes.  Repeat EKG better appearing inferiorly.  Discussed case with on-call interventional cardiologist and no STEMI.  No Cath Lab activation.  Labs interpreted by me notable for creatinine of 1.65 and troponin of 66.  Given aspirin and additional pain medication.  IV heparin.  Intermittent episodes of hypotension 80s over 50s without change in mentation.  Patient received IV fluids.  Extremities remain warm.  Patient required a couple doses of IV pain medication with eventual improvement in chest pain.  Given persistent pain, decision made for CT dissection study.  Dissection study concerning for possible occlusion of LAD.  Patient with history of LAD stent.  2-hour troponin 110.  Patient still having some chest pain.  Had repeat discussion with cardiology intensivist and no STEMI or Cath Lab activation.  Cardiology came to evaluate patient and stat echo was ordered demonstrating EF reportedly approximately 15 to 20% which appears to be new for the patient.  In repeat discussion with cardiology and interventional cardiology, transfer was recommended.  Discussed case with cardiac ICU accepting patient for transfer.  ICU recommending lactic acid be drawn which was performed.  Vasopressors were ordered in case patient became hypotensive again.  Patient reporting some mild shortness of breath but maintained saturation upper 90s.  Reports overall improvement in chest pain.  Having some intermittent vomiting with improvement with IV antiemetic.  Patient transferred.    Amount and/or Complexity of Data Reviewed  Labs: ordered.  Radiology: ordered.    Risk  OTC drugs.  Prescription drug management.             Medications   sodium chloride 0.9 % bolus 1,000 mL (0 mL Intravenous Stopped 3/2/25 0829)   ondansetron (ZOFRAN) injection 4 mg (4 mg Intravenous Given 3/2/25 0714)   fentaNYL injection 75 mcg (75 mcg Intravenous Given 3/2/25 0714)   aspirin tablet 325 mg (325 mg Oral Given 3/2/25 0750)   HYDROmorphone (DILAUDID) injection 0.5 mg (0.5 mg Intravenous Given 3/2/25 0750)   iohexol (OMNIPAQUE) 350 MG/ML injection (MULTI-DOSE) 100 mL (100 mL Intravenous Given 3/2/25 0809)   sodium chloride 0.9 % bolus 1,000 mL (0 mL Intravenous Stopped 3/2/25 0931)   HYDROmorphone (DILAUDID) injection 0.5 mg (0.5 mg Intravenous Given 3/2/25 0830)   ondansetron (ZOFRAN) injection 4 mg (4 mg Intravenous Given 3/2/25 0945)   heparin (porcine) injection 4,000 Units (4,000 Units Intravenous Given 3/2/25 0945)   perflutren lipid microsphere (DEFINITY) injection (1 mL/min Intravenous Given 3/2/25 1155)       ED Risk Strat Scores   HEART Risk Score      Flowsheet Row Most Recent Value   Heart Score  Risk Calculator    History 0 Filed at: 03/02/2025 0830   ECG 1 Filed at: 03/02/2025 0830   Age 2 Filed at: 03/02/2025 0830   Risk Factors 2 Filed at: 03/02/2025 0830   Troponin 2 Filed at: 03/02/2025 0830   HEART Score 7 Filed at: 03/02/2025 0830          HEART Risk Score      Flowsheet Row Most Recent Value   Heart Score Risk Calculator    History 0 Filed at: 03/02/2025 0830   ECG 1 Filed at: 03/02/2025 0830   Age 2 Filed at: 03/02/2025 0830   Risk Factors 2 Filed at: 03/02/2025 0830   Troponin 2 Filed at: 03/02/2025 0830   HEART Score 7 Filed at: 03/02/2025 0830                                                  History of Present Illness       Chief Complaint   Patient presents with    Chest Pain     Pt reports mid sternal chest pain that started at 5am and woke pt up from sleep. Pt vomited 1x.        Past Medical History:   Diagnosis Date    Heart attack (HCC)     Wegener's granulomatosis with renal involvement (HCC)       History reviewed. No pertinent surgical history.   History reviewed. No pertinent family history.   Social History     Tobacco Use    Smoking status: Every Day     Current packs/day: 0.25     Types: Cigarettes    Smokeless tobacco: Never   Vaping Use    Vaping status: Never Used   Substance Use Topics    Alcohol use: Never    Drug use: Never      E-Cigarette/Vaping    E-Cigarette Use Never User       E-Cigarette/Vaping Substances      I have reviewed and agree with the history as documented.     60-year-old male history of CAD presenting with chest pain.  Patient reports waking up around 5 AM with severe substernal chest pain.  Patient reports associated nausea and 1 episode nonbloody nonbilious emesis.  Took 1 nitro without any improvement.  Denies any recent change in pain.  Denies any shortness of breath.  Denies any association with exertion, diaphoresis, lightheadedness/syncope, radiation.  Denies any abdominal pain.  Denies any other complaints.  Chart reviewed.    Past Medical  History:  No date: Heart attack (HCC)  No date: Wegener's granulomatosis with renal involvement (HCC)  Family History: non-contributory  Social History          Review of Systems   Constitutional:  Negative for appetite change, chills, diaphoresis, fever and unexpected weight change.   HENT:  Negative for congestion and rhinorrhea.    Eyes:  Negative for photophobia and visual disturbance.   Respiratory:  Negative for cough, chest tightness and shortness of breath.    Cardiovascular:  Positive for chest pain. Negative for palpitations and leg swelling.   Gastrointestinal:  Positive for nausea and vomiting. Negative for abdominal distention, abdominal pain, blood in stool, constipation and diarrhea.   Genitourinary:  Negative for dysuria and hematuria.   Musculoskeletal:  Negative for back pain, joint swelling, neck pain and neck stiffness.   Skin:  Negative for color change, pallor, rash and wound.   Neurological:  Negative for dizziness, syncope, weakness, light-headedness and headaches.   Psychiatric/Behavioral:  Negative for agitation.    All other systems reviewed and are negative.          Objective       ED Triage Vitals   Temperature Pulse Blood Pressure Respirations SpO2 Patient Position - Orthostatic VS   03/02/25 0716 03/02/25 0701 03/02/25 0701 03/02/25 0701 03/02/25 0704 --   97.9 °F (36.6 °C) 89 (!) 83/63 18 98 %       Temp src Heart Rate Source BP Location FiO2 (%) Pain Score    -- -- -- -- 03/02/25 0714        10 - Worst Possible Pain      Vitals      Date and Time Temp Pulse SpO2 Resp BP Pain Score FACES Pain Rating User   03/02/25 1300 -- 92 100 % 19 95/69 -- -- RS   03/02/25 1230 -- 93 100 % 18 91/65 -- -- RS   03/02/25 1229 -- 96 100 % 18 89/59 -- -- RS   03/02/25 1200 -- 96 100 % 18 86/63 -- -- RS   03/02/25 1130 -- 95 -- 18 101/56 -- -- RS   03/02/25 1100 -- 81 -- -- 83/63 -- -- SZ   03/02/25 0830 -- -- -- -- -- 10 - Worst Possible Pain -- RS   03/02/25 0800 -- 81 100 % 18 92/63 -- -- RS    03/02/25 0750 -- -- -- -- -- 10 - Worst Possible Pain -- RS   03/02/25 0716 97.9 °F (36.6 °C) -- -- -- -- -- -- RS   03/02/25 0714 -- -- -- -- -- 10 - Worst Possible Pain -- RS   03/02/25 0704 -- -- 98 % -- -- -- -- RS   03/02/25 0701 -- 89 -- 18 83/63 -- -- RS            Physical Exam  Vitals and nursing note reviewed.   Constitutional:       General: He is not in acute distress.     Appearance: Normal appearance. He is well-developed. He is ill-appearing. He is not toxic-appearing or diaphoretic.   HENT:      Head: Normocephalic and atraumatic.      Nose: Nose normal. No congestion or rhinorrhea.      Mouth/Throat:      Mouth: Mucous membranes are moist.      Pharynx: Oropharynx is clear. No oropharyngeal exudate or posterior oropharyngeal erythema.   Eyes:      General: No scleral icterus.        Right eye: No discharge.         Left eye: No discharge.      Extraocular Movements: Extraocular movements intact.      Conjunctiva/sclera: Conjunctivae normal.      Pupils: Pupils are equal, round, and reactive to light.   Neck:      Vascular: No JVD.      Trachea: No tracheal deviation.      Comments: Supple. Normal range of motion.   Cardiovascular:      Rate and Rhythm: Normal rate and regular rhythm.      Heart sounds: Normal heart sounds. No murmur heard.     No friction rub. No gallop.      Comments: Normal rate and regular rhythm  Pulmonary:      Effort: Pulmonary effort is normal. No respiratory distress.      Breath sounds: Normal breath sounds. No stridor. No wheezing or rales.      Comments: Clear to auscultation bilaterally  Chest:      Chest wall: No tenderness.   Abdominal:      General: Bowel sounds are normal. There is no distension.      Palpations: Abdomen is soft.      Tenderness: There is no abdominal tenderness. There is no right CVA tenderness, left CVA tenderness, guarding or rebound.      Comments: Soft, nontender, nondistended.  Normal bowel sounds throughout   Musculoskeletal:          General: No swelling, tenderness, deformity or signs of injury. Normal range of motion.      Cervical back: Normal range of motion and neck supple. No rigidity. No muscular tenderness.      Right lower leg: No edema.      Left lower leg: No edema.   Lymphadenopathy:      Cervical: No cervical adenopathy.   Skin:     General: Skin is warm and dry.      Coloration: Skin is not pale.      Findings: No erythema or rash.   Neurological:      General: No focal deficit present.      Mental Status: He is alert. Mental status is at baseline.      Sensory: No sensory deficit.      Motor: No weakness or abnormal muscle tone.      Coordination: Coordination normal.      Gait: Gait normal.      Comments: Alert.  Strength and sensation grossly intact.  Ambulatory without difficulty at baseline.    Psychiatric:         Behavior: Behavior normal.         Thought Content: Thought content normal.         Results Reviewed       Procedure Component Value Units Date/Time    Lactic acid, plasma (w/reflex if result > 2.0) [[id number]]  (Abnormal) Collected: 03/02/25 1228    Lab Status: Final result Specimen: Blood from Arm, Left Updated: 03/02/25 1257     LACTIC ACID 2.4 mmol/L     Narrative:      Result may be elevated if tourniquet was used during collection.    HS Troponin I 4hr [[id number]]  (Abnormal) Collected: 03/02/25 1112    Lab Status: Final result Specimen: Blood from Arm, Left Updated: 03/02/25 1139     hs TnI 4hr 441 ng/L      Delta 4hr hsTnI 375 ng/L     APTT six (6) hours after Heparin bolus/drip initiation or dosing change [[id number]]  (Normal) Collected: 03/02/25 0947    Lab Status: Final result Specimen: Blood from Arm, Right Updated: 03/02/25 1005     PTT 27 seconds     Protime-INR [[id number]]  (Normal) Collected: 03/02/25 0947    Lab Status: Final result Specimen: Blood from Arm, Right Updated: 03/02/25 1005     Protime 13.7 seconds      INR 0.98    Narrative:      INR Therapeutic Range    Indication                                              INR Range      Atrial Fibrillation                                               2.0-3.0  Hypercoagulable State                                    2.0.2.3  Left Ventricular Asist Device                            2.0-3.0  Mechanical Heart Valve                                  -    Aortic(with afib, MI, embolism, HF, LA enlargement,    and/or coagulopathy)                                     2.0-3.0 (2.5-3.5)     Mitral                                                             2.5-3.5  Prosthetic/Bioprosthetic Heart Valve               2.0-3.0  Venous thromboembolism (VTE: VT, PE        2.0-3.0    HS Troponin I 2hr [[id number]]  (Abnormal) Collected: 03/02/25 0904    Lab Status: Final result Specimen: Blood from Arm, Right Updated: 03/02/25 0930     hs TnI 2hr 110 ng/L      Delta 2hr hsTnI 44 ng/L     HS Troponin 0hr (reflex protocol) [[id number]]  (Abnormal) Collected: 03/02/25 0715    Lab Status: Final result Specimen: Blood from Arm, Right Updated: 03/02/25 0743     hs TnI 0hr 66 ng/L     Comprehensive metabolic panel [[id number]]  (Abnormal) Collected: 03/02/25 0715    Lab Status: Final result Specimen: Blood from Arm, Right Updated: 03/02/25 0736     Sodium 141 mmol/L      Potassium 3.4 mmol/L      Chloride 106 mmol/L      CO2 25 mmol/L      ANION GAP 10 mmol/L      BUN 25 mg/dL      Creatinine 1.65 mg/dL      Glucose 134 mg/dL      Calcium 9.2 mg/dL      AST 29 U/L      ALT 37 U/L      Alkaline Phosphatase 47 U/L      Total Protein 6.7 g/dL      Albumin 4.0 g/dL      Total Bilirubin 1.33 mg/dL      eGFR 44 ml/min/1.73sq m     Narrative:      [employer] Kid[employer] guidelines for Chronic Kidney Disease (CKD):     Stage 1 with normal or high GFR (GFR > 90 mL/min/1.73 square meters)    Stage 2 Mild CKD (GFR = 60-89 mL/min/1.73 square meters)    Stage 3A Moderate CKD (GFR = 45-59 mL/min/1.73 square meters)    Stage 3B Moderate CKD (GFR = 30-44 mL/min/1.73 square meters)    Stage 4  Severe CKD (GFR = 15-29 mL/min/1.73 square meters)    Stage 5 End Stage CKD (GFR <15 mL/min/1.73 square meters)  Note: GFR calculation is accurate only with a steady state creatinine    Lipase [[id number]]  (Normal) Collected: 03/02/25 0715    Lab Status: Final result Specimen: Blood from Arm, Right Updated: 03/02/25 0736     Lipase 32 u/L     CBC and differential [[id number]]  (Abnormal) Collected: 03/02/25 0715    Lab Status: Final result Specimen: Blood from Arm, Right Updated: 03/02/25 0725     WBC 13.19 Thousand/uL      RBC 5.38 Million/uL      Hemoglobin 15.9 g/dL      Hematocrit 49.4 %      MCV 92 fL      MCH 29.6 pg      MCHC 32.2 g/dL      RDW 13.6 %      MPV 10.3 fL      Platelets 256 Thousands/uL      nRBC 0 /100 WBCs      Segmented % 65 %      Immature Grans % 0 %      Lymphocytes % 24 %      Monocytes % 7 %      Eosinophils Relative 3 %      Basophils Relative 1 %      Absolute Neutrophils 8.63 Thousands/µL      Absolute Immature Grans 0.04 Thousand/uL      Absolute Lymphocytes 3.15 Thousands/µL      Absolute Monocytes 0.89 Thousand/µL      Eosinophils Absolute 0.41 Thousand/µL      Basophils Absolute 0.07 Thousands/µL             CTA dissection protocol chest/abdomen/pelvis   Final Interpretation by Arthur Castellanos DO (03/02 0923)      1.  No evidence of aortic aneurysm or dissection.      2.  Although not a dedicated CT coronary angiogram, there appears to be absent perfusion/occlusion of the left anterior descending artery (series 3/58). Consider urgent interventional cardiology consultation in the appropriate clinical setting.      3.  Moderate to marked cardiomegaly with heavily calcified coronary arteries.      4.  No acute intra-abdominal or pelvic pathology. Right renal atrophy.      5.  Colonic diverticulosis without diverticulitis.      Findings were communicated with Dr. MEDHAT RINCON on 3/2/2025 at 9:21 AM via HIPPA compliant electronic text messaging with confirmation of receipt by  response text at 9:23 AM.      Workstation performed: [id number]         XR chest 1 view portable   Final Interpretation by Tonia Martinez MD (03/02 1022)      No acute cardiopulmonary disease.            Workstation performed: [id number]             Procedures    ED Medication and Procedure Management   Prior to Admission Medications   Prescriptions Last Dose Informant Patient Reported? Taking?   colchicine (COLCRYS) 0.6 mg tablet   No No   Sig: Take 1 tablet (0.6 mg total) by mouth daily   ibuprofen (MOTRIN) 800 mg tablet   Yes No   Sig: Take by mouth every 6 (six) hours as needed for mild pain      Facility-Administered Medications: None     Discharge Medication List as of 3/2/2025  1:12 PM        CONTINUE these medications which have NOT CHANGED    Details   colchicine (COLCRYS) 0.6 mg tablet Take 1 tablet (0.6 mg total) by mouth daily, Starting Thu 5/25/2023, Normal      ibuprofen (MOTRIN) 800 mg tablet Take by mouth every 6 (six) hours as needed for mild pain, Historical Med           No discharge procedures on file.  ED SEPSIS DOCUMENTATION   Time reflects when diagnosis was documented in both MDM as applicable and the Disposition within this note       Time User Action Codes Description Comment    3/2/2025  7:18 AM Erne, Ishaan Add [R07.9] Chest pain     3/2/2025  7:18 AM Erne, Ishaan Add [R11.2] Nausea & vomiting     3/2/2025 12:07 PM Erne, Ishaan Add [I50.9] New onset of congestive heart failure (HCC)     3/2/2025 12:07 PM Erne, Ishaan Add [I95.9] Hypotension     3/2/2025 12:07 PM Erne, Ishaan Add [R57.0] Cardiogenic shock (HCC)     3/2/2025 12:08 PM Erne, Ishaan Modify [R07.9] Chest pain     3/2/2025 12:08 PM Ernlennie Ishaan Modify [R57.0] Cardiogenic shock (HCC)                  Ishaan Leon MD  03/02/25 [id number]

## 2025-03-02 NOTE — CONSULTS
Heart Failure Consult Note - Rodney Pizarro 60 y.o. male MRN: [id number]    @ Encounter: [id number]      Assessment/Plan:    60 years old patient with past medical history of CAD/MI 2011 and 2014 status post PCI, ischemic cardiomyopathy, active tobacco use, Wegener's granulomatosis on prednisone presented with chest pain and dyspnea on exertion.  Cardiology is consulted for cardiogenic shock.    Cardiogenic shock   Acute on chronic HFrEF, NYHA IV, Stage C/D, LVEF 15-20%, LVIDd 6.5  Etiology: Ischemic. Severe CAD as below.     Weight: 175 lb  I/O: 175 mL out, net -150 mL thus far.  BNP: 708  Lactic acid: 2.1 > 2.0  SvO2 60 > 52  Cr 1.6 > 1.3  Ficks CO/CI 2.9/1.5 > 2.6/1.3    Drips:   Milrinone 0.25 mcg/kg/hr  Levo 3 mcg/min  Bumex 2 mg/hr  Heparin gtt       Studies- personally reviewed by me    Echocardiogram 3/2/2025  LVEF: 15-20%  LVIDd: 6.5  RV: normal size, moderately reduced function  MR: moderate to severe  PASP: 30 mmHg, mild TR  RVOT: no notching   Other: moderate AI, likely low flow low gradient severe AS    Neurohormonal Blockade:   --Beta-Blocker:   --ACEi, ARB or ARNi:    (or SVR reduction)  --Aldosterone Receptor Blocker:  --SGLT2-I:      Volume management:  --Home Diuretic:   --Inpatient diuretic: Bumex drip at 2 mg/hr.     Sudden Cardiac Death Risk Reduction:  --ICD:     Electrical Resynchronization:  --Candidacy for BiV device:    Advanced Therapies (if appropriate):  --Inotrope:  --LVAD/Transplant Candidacy:    Type I NSTEMI  History of MI 2011 and 2014 status post PCI in [place].  EKG on presentation sinus rhythm, RBBB, old inferior, old anterior MI.  Ongoing chest pain, uptrending troponin  Coronary angiography 3/2/2025:  -70% ostial LAD, 100% proximal LAD occlusion at the site of previous stent  -Large LCx with 90% ostial lesion  -100% proximal RCA.  -IABP was not placed due to moderate AI    Valvular heart disease  Mod-severe MR, Mod AI, low flow low gradient AS    SEAN   Cr 1.6 > 1.38.  Unknown  baseline     Wegener's granulomatosis   On prednisone    Tobacco use   Active, 1 PPD     Today's Plan:  Continue current inotropic and vasopressor support.  Trend endpoints every 6 hours.  Continue diuresis for goal net negative 1-2 L/ 24 hours.  Continue aspirin, heparin gtt, statin.  Multidisciplinary discussion in regards to revascularization options.      Case was discussed and reviewed with Dr. Chua.    HPI:     Rodney Pizarro is a 60 y.o. patient with past medical history of CAD, MI 2011 and 2014 s/p PCI in [place], ischemic cardiomyopathy, active tobacco use, Wegener's granulomatosis on prednisone, gout, active tobacco use presented with chest pain.  Patient reported that over the past 1 to 2 weeks he was having progressive chest pain and dyspnea on minimal exertion.  He states that earlier this morning, he experienced severe 9 out of 10 chest pain/heaviness radiating to his shoulders associated with nausea and diaphoresis.  He states it resembled his previous heart attack.  Patient previously followed in [place] however has not seen cardiology and has not taken his cardiac meds in 7 to 8 years since moving to [place].  per discussion with the patient's wife, he only takes prednisone and aspirin as needed if he develops chest pain.  He continues to smoke about a pack per day.  Patient received a CT scan at [facility] which showed absent perfusion/occlusion of LAD.  Follow-up TTE showed severe combined systolic and diastolic dysfunction with EF 15 to 20% with aneurysmal apex and RWMA.  His troponin was elevated.  This started treatment for ACS with aspirin, heparin drip, statin.  Due to hypotension, he was not able to tolerate nitroglycerin.  He was started on low-dose Levophed.  He was transferred to [facility] for higher level of care.  Upon arrival to the CCU, patient was complaining of ongoing chest pain, 8/10 radiating to both shoulders.  He was off of Levophed however restarted shortly thereafter.    Past  Medical History:   Diagnosis Date    Heart attack (HCC)     Wegener's granulomatosis with renal involvement (HCC)        Review of Systems   Respiratory:  Positive for shortness of breath.    Cardiovascular:  Positive for chest pain.        No Known Allergies  .    Current Facility-Administered Medications:     aspirin chewable tablet 81 mg, 81 mg, Oral, Daily, Jono Arce PA-C    bumetanide (BUMEX) 12.5 mg infusion 50 mL, 2 mg/hr, Intravenous, Continuous, Jono Arce PA-C    chlorhexidine (PERIDEX) 0.12 % oral rinse 15 mL, 15 mL, Mouth/Throat, Q12H ANDRESSA, Jono Arce PA-C    EPINEPHrine 5,000 mcg (STANDARD CONCENTRATION) IV in sodium chloride 0.9% 250 mL, 1-10 mcg/min, Intravenous, Titrated, Jono Arce PA-C, Held at 03/02/25 1445    heparin (porcine) 25,000 units in 0.45% NaCl 250 mL infusion (premix), 3-20 Units/kg/hr (Order-Specific), Intravenous, Titrated, Micha Veliz DO, Held at 03/02/25 1455    heparin (porcine) injection 2,000 Units, 2,000 Units, Intravenous, Q6H PRN, Prabhdeep Hehar, DO    heparin (porcine) injection 4,000 Units, 4,000 Units, Intravenous, Q6H PRN, Prabhdeep Hehar, DO    HYDROmorphone HCl (DILAUDID) injection 0.2 mg, 0.2 mg, Intravenous, Once, Jono Arce PA-C    milrinone (PRIMACOR) 20 mg in 100 mL infusion (premix), 0.25 mcg/kg/min, Intravenous, Continuous, Jono Arce PA-C, Last Rate: 6 mL/hr at 03/02/25 1535, 0.25 mcg/kg/min at 03/02/25 1535    norepinephrine (LEVOPHED) 4 mg (STANDARD CONCENTRATION) IV in sodium chloride 0.9% 250 mL, 1-30 mcg/min, Intravenous, Titrated, Jono Arce PA-C, Last Rate: 7.5 mL/hr at 03/02/25 1438, 2 mcg/min at 03/02/25 1438    ondansetron (ZOFRAN) injection 4 mg, 4 mg, Intravenous, Q6H PRN, Jono Arce PA-C, 4 mg at 03/02/25 1421    [START ON 3/3/2025] predniSONE tablet 10 mg, 10 mg, Oral, Early Morning, Jono Arce PA-C    predniSONE tablet 5 mg, 5 mg, Oral, HS, Jono Arce PA-C    Social History     Socioeconomic  History    Marital status: /Civil Union     Spouse name: Not on file    Number of children: Not on file    Years of education: Not on file    Highest education level: Not on file   Occupational History    Not on file   Tobacco Use    Smoking status: Every Day     Current packs/day: 0.25     Types: Cigarettes    Smokeless tobacco: Never   Vaping Use    Vaping status: Never Used   Substance and Sexual Activity    Alcohol use: Never    Drug use: Never    Sexual activity: Not on file   Other Topics Concern    Not on file   Social History Narrative    Not on file     Social Drivers of Health     Financial Resource Strain: Not on file   Food Insecurity: No Food Insecurity (3/2/2025)    Nursing - Inadequate Food Risk Classification     Worried About Running Out of Food in the Last Year: Not on file     Ran Out of Food in the Last Year: Not on file     Ran Out of Food in the Last Year: Never true   Transportation Needs: No Transportation Needs (3/2/2025)    Nursing - Transportation Risk Classification     Lack of Transportation: Not on file     Lack of Transportation: No   Physical Activity: Not on file   Stress: Not on file   Social Connections: Not on file   Intimate Partner Violence: Unknown (3/2/2025)    Nursing IPS     Feels Physically and Emotionally Safe: Not on file     Physically Hurt by Someone: Not on file     Humiliated or Emotionally Abused by Someone: Not on file     Physically Hurt by Someone: No     Hurt or Threatened by Someone: No   Housing Stability: Unknown (3/2/2025)    Nursing: Inadequate Housing Risk Classification     Has Housing: Not on file     Worried About Losing Housing: Not on file     Unable to Get Utilities: Not on file     Unable to Pay for Housing in the Last Year: No     Has Housin       No family history on file.    Physical Exam:    Vitals: Blood pressure 98/68, pulse 92, temperature 98.6 °F (37 °C), temperature source Oral, resp. rate (!) 31, SpO2 98%., There is no height or  "weight on file to calculate BMI.,   Wt Readings from Last 3 Encounters:   03/02/25 79.4 kg (175 lb)   06/02/23 80.2 kg (176 lb 12.8 oz)   05/25/23 81.8 kg (180 lb 6.4 oz)       Physical Exam  Vitals and nursing note reviewed.   Cardiovascular:      Rate and Rhythm: Tachycardia present.      Heart sounds: Murmur heard.      Comments: +JVD  Pulmonary:      Breath sounds: Rales present.   Abdominal:      Palpations: Abdomen is soft.   Musculoskeletal:      Right lower leg: Edema present.      Left lower leg: Edema present.   Neurological:      Mental Status: He is alert.         Labs & Results:    Lab Results   Component Value Date    WBC 13.19 (H) 03/02/2025    HGB 15.9 03/02/2025    HCT 49.4 (H) 03/02/2025    MCV 92 03/02/2025     03/02/2025     Lab Results   Component Value Date    SODIUM 140 03/02/2025    K 3.9 03/02/2025     (H) 03/02/2025    CO2 21 03/02/2025    BUN 24 03/02/2025    CREATININE 1.38 (H) 03/02/2025    GLUC 121 03/02/2025    CALCIUM 7.9 (L) 03/02/2025     No results found for: \"NTBNP\"   No results found for: \"CHOLESTEROL\"  No results found for: \"HDL\"  No results found for: \"TRIG\"  No results found for: \"NONHDLC\"    EKG personally reviewed by Kade Del Angel MD.     Thank you for the opportunity to participate in the care of this patient.     Kade Del Angel MD  Cardiology Fellow  FY-1   "

## 2025-03-02 NOTE — QUICK NOTE
Wife and patient updated at bedside on severity of CAD and patient's worsening cardiogenic shock. Updated them on the guarded prognosis of his current condition and the possibility for further decompensation tonight. All questions answered.     Jono Arce PA-C

## 2025-03-02 NOTE — ASSESSMENT & PLAN NOTE
"Patient presents with chest pain  Anterior infarct noted on ECG  HS troponin 66/110, continue to trend  CTA dissection report- \"Although not a dedicated CT coronary angiogram, there appears to be absent perfusion/occlusion of the left anterior descending artery (series 3/58). Consider urgent interventional cardiology consultation in the appropriate clinical setting. \"  Patient has hx stents in [place]  Plan for STAT TTE, further plan pending results  Continue heparin infusion, ASA, and atorvastatin ordered  No BB or Nitro given hypotension  "

## 2025-03-02 NOTE — PROCEDURES
Arterial Line Insertion    Date/Time: 3/2/2025 4:03 PM    Performed by: Jono Arce PA-C  Authorized by: Jono Arce PA-C    Patient location:  Bedside  Consent:     Consent obtained:  Verbal    Consent given by:  Patient    Risks discussed:  Bleeding and pain  Universal protocol:     Procedure explained and questions answered to patient or proxy's satisfaction: yes      Required blood products, implants, devices, and special equipment available: yes      Site/side marked: yes      Immediately prior to procedure a time out was called: yes      Patient identity confirmed:  Verbally with patient and arm band  Indications:     Indications: hemodynamic monitoring    Pre-procedure details:     Skin preparation:  Chlorhexidine    Preparation: Patient was prepped and draped in sterile fashion    Anesthesia (see MAR for exact dosages):     Anesthesia method:  Local infiltration    Local anesthetic:  Lidocaine 1% w/o epi  Procedure details:     Location / Tip of Catheter:  Radial    Laterality:  Left    Needle gauge:  20 G    Placement technique:  Seldinger    Number of attempts:  2    Successful placement: yes      Transducer: waveform confirmed    Post-procedure details:     Post-procedure:  Biopatch applied, secured with tape, sterile dressing applied and sutured    CMS:  Normal    Patient tolerance of procedure:  Tolerated well, no immediate complications

## 2025-03-02 NOTE — CONSULTS
"Consultation - Cardiology   Rodney Pizarro 60 y.o. male MRN: [id number]  Unit/Bed#: FT 01 Encounter: [id number]  03/02/25  10:22 AM    Assessment/ Plan:  Assessment & Plan  Unstable angina (HCC)  Patient presents with chest pain  Anterior infarct noted on ECG  HS troponin 66/110, continue to trend  CTA dissection report- \"Although not a dedicated CT coronary angiogram, there appears to be absent perfusion/occlusion of the left anterior descending artery (series 3/58). Consider urgent interventional cardiology consultation in the appropriate clinical setting. \"  Patient has hx stents in [place]  Plan for STAT TTE, further plan pending results  Continue heparin infusion, ASA, and atorvastatin ordered  No BB or Nitro given hypotension  Coronary artery disease involving native coronary artery of native heart with unstable angina pectoris (HCC)  Hx stenting in [place], report unavailable to review  See plan above  Elevated serum creatinine  Ct 1.65, baseline unknown        History of Present Illness   Physician Requesting Consult: Ishaan Leon MD    Reason for Consult / Principal Problem: Concern for LAD occlusion on CT, Chest pain    HPI: Rodney Pizarro is a 60 y.o. year old male with PMH of CAD s/p PCI in [place], tobacco use who presents with chest pain.  Pain started at 5 AM this morning and increased dyspnea on exertion leading up to hospitalization.  Patient has a history of CAD with stenting in 2014 in [place]. He has not seen a cardiologist in about 8 years and has not been taking any cardiac medication during that time. Patient also smokes 1PPD. Upon further evaluation in the ED, patient had CT dissection which was negative for aortic aneurysm or dissection, noted that although not dedicated CT coronary angiogram, there appears to be absent perfusion/occlusion of the left anterior descending artery.  Patient reports 6 out of 10 chest pain.  ECGs are negative for STEMI, show anterior infarct. Patient notes 6/10 chest pain at this " time. He did receive nitro on admission but has been too hypotensive for further antianginals.    Inpatient consult to Cardiology  Consult performed by: LAUREN Munoz  Consult ordered by: Ishaan Leon MD        EKG: Sinus rhythm with Premature atrial complexes with Aberrant conduction  Possible Left atrial enlargement  Left axis deviation  Right bundle branch block  Inferior infarct , age undetermined       Review of Systems   Constitutional:  Negative for chills, diaphoresis and fever.   Respiratory:  Positive for shortness of breath. Negative for cough and chest tightness.    Cardiovascular:  Positive for chest pain. Negative for palpitations and leg swelling.   Gastrointestinal:  Negative for abdominal distention, blood in stool, nausea and vomiting.   Genitourinary:  Negative for difficulty urinating.   Musculoskeletal:  Negative for arthralgias and back pain.   Neurological:  Negative for dizziness, syncope, light-headedness and headaches.   Psychiatric/Behavioral:  Negative for agitation and confusion. The patient is not nervous/anxious.        Historical Information   Past Medical History:   Diagnosis Date    Heart attack (HCC)     Wegener's granulomatosis with renal involvement (HCC)      History reviewed. No pertinent surgical history.  Social History     Substance and Sexual Activity   Alcohol Use Never     Social History     Substance and Sexual Activity   Drug Use Never     Social History     Tobacco Use   Smoking Status Every Day    Current packs/day: 0.25    Types: Cigarettes   Smokeless Tobacco Never       Family History: History reviewed. No pertinent family history.    Meds/Allergies   all current active meds have been reviewed and current meds:   Current Facility-Administered Medications:     [START ON 3/3/2025] aspirin chewable tablet 81 mg, Daily    atorvastatin (LIPITOR) tablet 80 mg, Daily With Dinner    heparin (porcine) 25,000 units in 0.45% NaCl 250 mL infusion (premix), Titrated,  Last Rate: 12 Units/kg/hr (25 0945)    heparin (porcine) injection 2,000 Units, Q6H PRN    heparin (porcine) injection 4,000 Units, Q6H PRN  No Known Allergies    Objective   Vitals: Blood pressure 92/63, pulse 81, temperature 97.9 °F (36.6 °C), resp. rate 18, weight 79.4 kg (175 lb), SpO2 100%., Body mass index is 27.41 kg/m².,   Orthostatic Blood Pressures      Flowsheet Row Most Recent Value   Blood Pressure 92/63 filed at 2025 0800            Systolic (24hrs), Av , Min:83 , Max:92     Diastolic (24hrs), Av, Min:63, Max:63      No intake or output data in the 24 hours ending 25 1022    Invasive Devices       Peripheral Intravenous Line  Duration             Peripheral IV 25 Right Antecubital <1 day                        Physical Exam:  Physical Exam  Vitals and nursing note reviewed.   Constitutional:       General: He is not in acute distress.     Appearance: He is well-developed.   HENT:      Head: Normocephalic and atraumatic.   Eyes:      Conjunctiva/sclera: Conjunctivae normal.   Neck:      Vascular: No carotid bruit.   Cardiovascular:      Rate and Rhythm: Normal rate and regular rhythm.      Heart sounds: Normal heart sounds. No murmur heard.  Pulmonary:      Effort: Pulmonary effort is normal. No respiratory distress.      Breath sounds: Normal breath sounds.   Abdominal:      Palpations: Abdomen is soft.      Tenderness: There is no abdominal tenderness.   Musculoskeletal:         General: No swelling.      Cervical back: Neck supple.      Right lower leg: No edema.      Left lower leg: No edema.   Skin:     General: Skin is warm and dry.      Capillary Refill: Capillary refill takes less than 2 seconds.   Neurological:      Mental Status: He is alert and oriented to person, place, and time.   Psychiatric:         Mood and Affect: Mood normal.          Lab Results:     Cardiac Profile:   Results from last 7 days   Lab Units 25  0904 25  0715   HS TNI 0HR ng/L   --  66*   HS TNI 2HR ng/L 110*  --    HSTNI D2 ng/L 44*  --        CBC with diff:   Results from last 7 days   Lab Units 03/02/25  0715   WBC Thousand/uL 13.19*   HEMOGLOBIN g/dL 15.9   HEMATOCRIT % 49.4*   MCV fL 92   PLATELETS Thousands/uL 256   RBC Million/uL 5.38   MCH pg 29.6   MCHC g/dL 32.2   RDW % 13.6   MPV fL 10.3   NRBC AUTO /100 WBCs 0         CMP:   Results from last 7 days   Lab Units 03/02/25  0715   POTASSIUM mmol/L 3.4*   CHLORIDE mmol/L 106   CO2 mmol/L 25   BUN mg/dL 25   CREATININE mg/dL 1.65*   CALCIUM mg/dL 9.2   AST U/L 29   ALT U/L 37   ALK PHOS U/L 47   EGFR ml/min/1.73sq m 44

## 2025-03-02 NOTE — EMTALA/ACUTE CARE TRANSFER
[facility] EMERGENCY DEPARTMENT  [address]  [place] [place] [address]  Dept: [phone]      EMTALA TRANSFER CONSENT    NAME Rodney HORAN 1965                              MRN [id number]    I have been informed of my rights regarding examination, treatment, and transfer   by Dr. Ishaan Leon MD    Benefits: Specialized equipment and/or services available at the receiving facility (Include comment)________________________ (Cardiac ICU)    Risks:        Consent for Transfer:  I acknowledge that my medical condition has been evaluated and explained to me by the emergency department physician or other qualified medical person and/or my attending physician, who has recommended that I be transferred to the service of  Accepting Physician: Keren mc  . The above potential benefits of such transfer, the potential risks associated with such transfer, and the probable risks of not being transferred have been explained to me, and I fully understand them.  The doctor has explained that, in my case, the benefits of transfer outweigh the risks.  I agree to be transferred.    I authorize the performance of emergency medical procedures and treatments upon me in both transit and upon arrival at the receiving facility.  Additionally, I authorize the release of any and all medical records to the receiving facility and request they be transported with me, if possible.  I understand that the safest mode of transportation during a medical emergency is an ambulance and that the Hospital advocates the use of this mode of transport. Risks of traveling to the receiving facility by car, including absence of medical control, life sustaining equipment, such as oxygen, and medical personnel has been explained to me and I fully understand them.    (ALEX CORRECT BOX BELOW)  [  ]  I consent to the stated transfer and to be transported by ambulance/helicopter.  [  ]  I  consent to the stated transfer, but refuse transportation by ambulance and accept full responsibility for my transportation by car.  I understand the risks of non-ambulance transfers and I exonerate the Hospital and its staff from any deterioration in my condition that results from this refusal.    X___________________________________________    DATE  25  TIME________  Signature of patient or legally responsible individual signing on patient behalf           RELATIONSHIP TO PATIENT_________________________          Provider Certification    NAME Rodney Pizarro                                         1965                              MRN [id number]    A medical screening exam was performed on the above named patient.  Based on the examination:    Condition Necessitating Transfer The primary encounter diagnosis was Cardiogenic shock (HCC). Diagnoses of Chest pain, Nausea & vomiting, New onset of congestive heart failure (HCC), and Hypotension were also pertinent to this visit.    Patient Condition: The patient has been stabilized such that within reasonable medical probability, no material deterioration of the patient condition or the condition of the unborn child(soledad) is likely to result from the transfer    Reason for Transfer: Level of Care needed not available at this facility    Transfer Requirements: Facility     Space available and qualified personnel available for treatment as acknowledged by    [facility] to accept transfer and to provide appropriate medical treatment as acknowledged by       [facility]  Appropriate medical records of the examination and treatment of the patient are provided at the time of transfer   STAFF INITIAL WHEN COMPLETED _______  Transfer will be performed by qualified personnel from    and appropriate transfer equipment as required, including the use of necessary and appropriate life support measures.    Provider Certification: I have examined the patient and explained the  following risks and benefits of being transferred/refusing transfer to the patient/family:  General risk, such as traffic hazards, adverse weather conditions, rough terrain or turbulence, possible failure of equipment (including vehicle or aircraft), or consequences of actions of persons outside the control of the transport personnel, Risk of worsening condition      Based on these reasonable risks and benefits to the patient and/or the unborn child(soledad), and based upon the information available at the time of the patient’s examination, I certify that the medical benefits reasonably to be expected from the provision of appropriate medical treatments at another medical facility outweigh the increasing risks, if any, to the individual’s medical condition, and in the case of labor to the unborn child, from effecting the transfer.    X____________________________________________ DATE 03/02/25        TIME_______      ORIGINAL - SEND TO MEDICAL RECORDS   COPY - SEND WITH PATIENT DURING TRANSFER

## 2025-03-02 NOTE — ASSESSMENT & PLAN NOTE
Per family, no hx of CKD  Initial Cr 1.6 on arrival  Bumex bolus and gtt initiated  Strict I&O's  Q6 BMP's  Avoid nephrotoxic agents

## 2025-03-02 NOTE — ASSESSMENT & PLAN NOTE
Presented to [facility] with chest pain, diaphoresis, nausea  EKG without obvious acute ischemic changes   STAT TTE from 3/2:  Left Ventricle: Left ventricular cavity size is dilated with LVEDD of 6.5 cm. 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 No apical thrombus visualized. The left ventricular ejection fraction is 15-20%. Systolic function is severely reduced. There is severe global hypokinesis with apical aneurysmal dilatation and akinesis of the mid to apical anterior region. There is thinning and akinesia in the basal to mid inferior consistent with transmural myocardial infarction. Diastolic function is severely abnormal, consistent with Grade III restrictive diastolic dysfunction.  IVS: There is abnormal septal motion of unclear etiology.  Right Ventricle: Systolic function is moderately reduced.  Left Atrium: The atrium is severely dilated.  Aortic Valve: The aortic valve is trileaflet. The leaflets are severely thickened. The leaflets are severely calcified. There is severely reduced mobility. Low flow, low gradient aortic stenosis cannot be excluded. There is moderate regurgitation.  Mitral Valve: There is moderate annular calcification. There is moderate to severe regurgitation with an eccentrically anteriorly directed jet.  Aorta: The aortic root is mildly dilated. The ascending aorta is normal in size.\  Does have known hx of previous cardiac stents but no known hx of heart failure but did used to take 80mg of IV lasix  Initial lactate 2.4, ScvO2 59, calculated Ficks C.I of 1.5    Plan:  Discussed with cardiology and interventional cardiology  No indication for urgent cath lab per interventional cards, will treat pt as NSTEMI  Hep gtt  Asa 81mg QD  Levophed for BP support - initiated on arrival to [facility]  Q6hr VBG's, lactate, BMP's  Poor response to 2mg of IV bumex  Initiate bumex gtt at 2  Hold on initiation of inotropic therapy given concerns of ongoing ischemia  Should patient show evidence of clinical  deterioration, will initiate low dose primacore   Heart failure consultation   Strict I&O's

## 2025-03-02 NOTE — PROCEDURES
Central Line Insertion    Date/Time: 3/2/2025 4:01 PM    Performed by: Jono Arce PA-C  Authorized by: Jono Arce PA-C    Patient location:  Bedside  Other Assisting Provider: No    Consent:     Consent obtained:  Verbal    Consent given by:  Patient    Risks discussed:  Incorrect placement, bleeding and arterial puncture    Alternatives discussed:  No treatment and delayed treatment  Universal protocol:     Procedure explained and questions answered to patient or proxy's satisfaction: yes      Relevant documents present and verified: yes      Radiology Images displayed and confirmed.  If images not available, report reviewed: yes      Required blood products, implants, devices, and special equipment available: yes      Site/side marked: yes      Immediately prior to procedure, a time out was called: yes      Patient identity confirmed:  Verbally with patient and provided demographic data  Pre-procedure details:     Hand hygiene: Hand hygiene performed prior to insertion      Sterile barrier technique: All elements of maximal sterile technique followed      Skin preparation:  2% chlorhexidine    Skin preparation agent: Skin preparation agent completely dried prior to procedure    Indications:     Central line indications: medications requiring central line and hemodynamic monitoring    Anesthesia (see MAR for exact dosages):     Anesthesia method:  Local infiltration    Local anesthetic:  Lidocaine 1% w/o epi  Procedure details:     Location:  Right internal jugular    Vessel type: vein      Approach: percutaneous technique used      Patient position:  Reverse Trendelenburg    Catheter type:  Triple lumen 16cm    Landmarks identified: yes      Ultrasound guidance: yes      Ultrasound image availability:  Images available in PACS    Sterile ultrasound techniques: Sterile gel and sterile probe covers were used      Manometry confirmation: yes      Number of attempts:  1    Successful placement: yes       Catheter tip vessel location: atriocaval junction    Post-procedure details:     Post-procedure:  Dressing applied and line sutured    Assessment:  Blood return through all ports, no pneumothorax on x-ray, free fluid flow and placement verified by x-ray    Post-procedure complications: none      Patient tolerance of procedure:  Tolerated well, no immediate complications

## 2025-03-02 NOTE — H&P
H&P - Critical Care/ICU   Name: Rodney Pizarro 60 y.o. male I MRN: [id number]  Unit/Bed#: PPHP-312-01 I Date of Admission: 3/2/2025   Date of Service: 3/2/2025 I Hospital Day: 0       Assessment & Plan  Cardiogenic shock (HCC)  Presented to [facility] with chest pain, diaphoresis, nausea  EKG without obvious acute ischemic changes   STAT TTE from 3/2:  Left Ventricle: Left ventricular cavity size is dilated with LVEDD of 6.5 cm. 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 No apical thrombus visualized. The left ventricular ejection fraction is 15-20%. Systolic function is severely reduced. There is severe global hypokinesis with apical aneurysmal dilatation and akinesis of the mid to apical anterior region. There is thinning and akinesia in the basal to mid inferior consistent with transmural myocardial infarction. Diastolic function is severely abnormal, consistent with Grade III restrictive diastolic dysfunction.  IVS: There is abnormal septal motion of unclear etiology.  Right Ventricle: Systolic function is moderately reduced.  Left Atrium: The atrium is severely dilated.  Aortic Valve: The aortic valve is trileaflet. The leaflets are severely thickened. The leaflets are severely calcified. There is severely reduced mobility. Low flow, low gradient aortic stenosis cannot be excluded. There is moderate regurgitation.  Mitral Valve: There is moderate annular calcification. There is moderate to severe regurgitation with an eccentrically anteriorly directed jet.  Aorta: The aortic root is mildly dilated. The ascending aorta is normal in size.\  Does have known hx of previous cardiac stents but no known hx of heart failure but did used to take 80mg of IV lasix  Initial lactate 2.4, ScvO2 59, calculated Ficks C.I of 1.5    Plan:  Discussed with cardiology and interventional cardiology  No indication for urgent cath lab per interventional cards, will treat pt as NSTEMI  Hep gtt  Asa 81mg QD  Levophed for BP support - initiated on  arrival to [facility]  Q6hr VBG's, lactate, BMP's  Poor response to 2mg of IV bumex  Initiate bumex gtt at 2  Hold on initiation of inotropic therapy given concerns of ongoing ischemia  Should patient show evidence of clinical deterioration, will initiate low dose primacore   Heart failure consultation   Strict I&O's  Unstable angina (HCC)  Ongoing chest pain 8/10 crushing/burning chest pain on arrival to [facility]  SBP prohibitive of initiating nitro gtt  Dilaudid PRN for pain control   Coronary artery disease involving native coronary artery of native heart with unstable angina pectoris (HCC)  Previous hx of stents to LAD per wife - stopped taking medications 4-5 years ago  Wegener's granulomatosis with renal involvement (HCC)  On daily prednisone per pt - continue as ordered  10mg Qam  5mg QHS  Acute kidney injury (HCC)  Per family, no hx of CKD  Initial Cr 1.6 on arrival  Bumex bolus and gtt initiated  Strict I&O's  Q6 BMP's  Avoid nephrotoxic agents  Disposition: Critical care    History of Present Illness   Rodney Pizarro is a 60 y.o. with a PMHx of Wegener's granulomatosis, CAD with previous stents to LAD, and gout who presented to [facility] with chest pain that woke him up this morning. The patient states the pain began suddenly and was associated with heavyness in his chest, SOB, diaphoresis, and nausea/emesis. On presentation to ED, patient's EKG did not show any evidence of acute ischemia but a stat echo showed an EF of 20% with G3 diastolic dysfunction. Patient with relative hypotension and mild lactic acidemia so he was transferred to [facility] for HF consultation.     On arrival, the patient endorses 8/10 chest pain/heavyness that radiates to his shoulders. He is also having ongoing SOB, , nausea, and numbness/tingling in both his arms.  He states he has been having some /CARRILLO for the last week that has been getting progressively worse. He does have a hx of previous LAD stents and denies a previous hx of HF but was on DAPT  and lasix 80mg BID - but he has not taken these medications in over 4 years. He does currently smoke but denies any recent ETOH use. Retired, lives at home with his wife.     History obtained from chart review and the patient.  Review of Systems: See HPI for Review of Systems    Historical Information   Past Medical History:  No date: Heart attack (HCC)  No date: Wegener's granulomatosis with renal involvement (HCC) No past surgical history on file.   Current Outpatient Medications   Medication Instructions    colchicine (COLCRYS) 0.6 mg, Oral, Daily    ibuprofen (MOTRIN) 800 mg tablet Every 6 hours PRN    predniSONE 10 mg, Daily    predniSONE 5 mg, Daily at bedtime    No Known Allergies   Social History     Tobacco Use    Smoking status: Every Day     Current packs/day: 0.25     Types: Cigarettes    Smokeless tobacco: Never   Vaping Use    Vaping status: Never Used   Substance Use Topics    Alcohol use: Never    Drug use: Never    No family history on file.       Objective :                   Vitals I/O      Most Recent Min/Max in 24hrs   Temp 98.6 °F (37 °C) Temp  Min: 97.9 °F (36.6 °C)  Max: 98.6 °F (37 °C)   Pulse 92 Pulse  Min: 81  Max: 98   Resp (!) 31 Resp  Min: 17  Max: 37   BP 98/68 BP  Min: 83/63  Max: 101/56   O2 Sat 98 % SpO2  Min: 98 %  Max: 100 %      Intake/Output Summary (Last 24 hours) at 3/2/2025 1535  Last data filed at 3/2/2025 1515  Gross per 24 hour   Intake --   Output 150 ml   Net -150 ml       Diet NPO; Sips with meds    Invasive Monitoring   Arterial Line  [place] BP 93/61  Arterial Line BP  Min: 86/55  Max: 93/61   MAP 72 mmHg  Arterial Line MAP (mmHg)  Min: 69 mmHg  Max: 72 mmHg           Physical Exam   Physical Exam  Vitals and nursing note reviewed.   Eyes:      Extraocular Movements: Extraocular movements intact.      Pupils: Pupils are equal, round, and reactive to light.   Skin:     General: Skin is cool and dry.      Comments: Cool to ankles bilaterally  Cool in bilateral fingers    Neck:      Vascular: JVD and central line present.   Cardiovascular:      Rate and Rhythm: Regular rhythm. Tachycardia present.      Pulses: Normal pulses.      Heart sounds: Normal heart sounds.   Musculoskeletal:      Right lower le+ Edema present.      Left lower le+ Edema present.   Abdominal:      Palpations: Abdomen is soft.      Tenderness: There is no abdominal tenderness. There is no guarding.   Constitutional:       General: He is not in acute distress.     Appearance: He is ill-appearing.   Pulmonary:      Effort: Tachypnea, accessory muscle usage and accessory muscle usage present. No respiratory distress.      Breath sounds: Rhonchi present. No wheezing or rales.   Neurological:      General: No focal deficit present.      Mental Status: He is alert and oriented to person, place and time. He is calm.      Motor: Strength full and intact in all extremities.   Genitourinary/Anorectal:  Covarrubias present.        Diagnostic Studies        Lab Results: I have reviewed the following results:     Medications:  Scheduled PRN   aspirin, 81 mg, Daily  chlorhexidine, 15 mL, Q12H ANDRESSA  HYDROmorphone, 0.2 mg, Once  [START ON 3/3/2025] predniSONE, 10 mg, Early Morning  predniSONE, 5 mg, HS      heparin (porcine), 2,000 Units, Q6H PRN  heparin (porcine), 4,000 Units, Q6H PRN  ondansetron, 4 mg, Q6H PRN       Continuous    bumetanide (BUMEX) 12.5 mg infusion 50 mL, 2 mg/hr  epinephrine, 1-10 mcg/min, Last Rate: Stopped (25 1445)  heparin (porcine), 3-20 Units/kg/hr (Order-Specific), Last Rate: Stopped (25 1455)  milrinone (Primacor) infusion, 0.25 mcg/kg/min, Last Rate: 0.25 mcg/kg/min (25 1535)  norepinephrine, 1-30 mcg/min, Last Rate: 2 mcg/min (25 1438)         Labs:   CBC    Recent Labs     25  0715   WBC 13.19*   HGB 15.9   HCT 49.4*        BMP    Recent Labs     25  0715 25  1406   SODIUM 141 140   K 3.4* 3.9    109*   CO2 25 21   AGAP 10 10   BUN 25 24    CREATININE 1.65* 1.38*   CALCIUM 9.2 7.9*       Coags    Recent Labs     03/02/25  0947 03/02/25  1406   INR 0.98  --    PTT 27 102*        Additional Electrolytes  Recent Labs     03/02/25  1406   MG 1.9   PHOS 4.3          Blood Gas    No recent results  Recent Labs     03/02/25  1452   PHVEN 7.272*   [id number] 50.2*   PO2VEN 35.9   KML0CSN 22.6*   BEVEN -4.7   W3EFDNS 59.9*    LFTs  Recent Labs     03/02/25  0715   ALT 37   AST 29   ALKPHOS 47   ALB 4.0   TBILI 1.33*       Infectious  No recent results  Glucose  Recent Labs     03/02/25  0715 03/02/25  1406   GLUC 134 121        Administrative Statements

## 2025-03-02 NOTE — PROGRESS NOTES
Verbal report given to EVA Torrez.   Patient tolerated procedure without any difficulty.  Right groin dry and intact with A-line to sheath, tegaderm in place.  Patient continues to have chest pain rated 6/10.   Patient prepared for transfer via stretcher on the monitor to P3.   Site to be verified by receiving RN.  Any changes from the above assessment will be documented by receiving RN.

## 2025-03-02 NOTE — BRIEF OP NOTE (RAD/CATH)
Cardiac cath performed via the RFA . The sheath was sewn in.    100 % proximal LAD occlusion at the site of previous stenting . There is also a 70% ostail LAD lesion.  Large L Cx artery with a 90 % ostial lesion.    100 % RCA occlusion proximally.    He is very very high risk for PCI of the L Cx ( his only open vessel ) .   He is very very high risk surgical candidate for MVR, AVR, CABG.    IABP was not placed due to moderate AI.

## 2025-03-02 NOTE — ASSESSMENT & PLAN NOTE
Ongoing chest pain 8/10 crushing/burning chest pain on arrival to [facility]  SBP prohibitive of initiating nitro gtt  Dilaudid PRN for pain control

## 2025-03-03 ENCOUNTER — APPOINTMENT (INPATIENT)
Dept: NON INVASIVE DIAGNOSTICS | Facility: HOSPITAL | Age: 60
DRG: 174 | End: 2025-03-03
Payer: COMMERCIAL

## 2025-03-03 ENCOUNTER — PATIENT OUTREACH (OUTPATIENT)
Dept: CARDIOLOGY CLINIC | Facility: CLINIC | Age: 60
End: 2025-03-03

## 2025-03-03 DIAGNOSIS — Z59.71 DOES NOT HAVE HEALTH INSURANCE: Primary | ICD-10-CM

## 2025-03-03 PROBLEM — I34.0 SEVERE MITRAL REGURGITATION: Status: ACTIVE | Noted: 2025-01-01

## 2025-03-03 PROBLEM — Z51.5 PALLIATIVE CARE ENCOUNTER: Status: ACTIVE | Noted: 2025-03-03

## 2025-03-03 PROBLEM — I35.1 MODERATE AORTIC INSUFFICIENCY: Status: ACTIVE | Noted: 2025-03-03

## 2025-03-03 LAB
ALBUMIN SERPL BCG-MCNC: 3.7 G/DL (ref 3.5–5)
ALBUMIN SERPL BCG-MCNC: 3.8 G/DL (ref 3.5–5)
ALP SERPL-CCNC: 47 U/L (ref 34–104)
ALP SERPL-CCNC: 50 U/L (ref 34–104)
ALT SERPL W P-5'-P-CCNC: 100 U/L (ref 7–52)
ALT SERPL W P-5'-P-CCNC: 147 U/L (ref 7–52)
ANION GAP SERPL CALCULATED.3IONS-SCNC: 10 MMOL/L (ref 4–13)
ANION GAP SERPL CALCULATED.3IONS-SCNC: 13 MMOL/L (ref 4–13)
ANION GAP SERPL CALCULATED.3IONS-SCNC: 13 MMOL/L (ref 4–13)
ANION GAP SERPL CALCULATED.3IONS-SCNC: 9 MMOL/L (ref 4–13)
APTT PPP: 77 SECONDS (ref 23–34)
APTT PPP: 87 SECONDS (ref 23–34)
AST SERPL W P-5'-P-CCNC: 626 U/L (ref 13–39)
AST SERPL W P-5'-P-CCNC: 987 U/L (ref 13–39)
BASE EX.OXY STD BLDV CALC-SCNC: 63.1 % (ref 60–80)
BASE EX.OXY STD BLDV CALC-SCNC: 64.2 % (ref 60–80)
BASE EX.OXY STD BLDV CALC-SCNC: 64.5 % (ref 60–80)
BASE EXCESS BLDA CALC-SCNC: -1.2 MMOL/L
BASE EXCESS BLDV CALC-SCNC: 0.1 MMOL/L
BASE EXCESS BLDV CALC-SCNC: 1.1 MMOL/L
BASE EXCESS BLDV CALC-SCNC: 2.5 MMOL/L
BILIRUB DIRECT SERPL-MCNC: 0.18 MG/DL (ref 0–0.2)
BILIRUB DIRECT SERPL-MCNC: 0.25 MG/DL (ref 0–0.2)
BILIRUB SERPL-MCNC: 1.28 MG/DL (ref 0.2–1)
BILIRUB SERPL-MCNC: 1.59 MG/DL (ref 0.2–1)
BODY TEMPERATURE: 100 DEGREES FEHRENHEIT
BUN SERPL-MCNC: 23 MG/DL (ref 5–25)
BUN SERPL-MCNC: 24 MG/DL (ref 5–25)
BUN SERPL-MCNC: 24 MG/DL (ref 5–25)
BUN SERPL-MCNC: 26 MG/DL (ref 5–25)
CA-I BLD-SCNC: 1.03 MMOL/L (ref 1.12–1.32)
CALCIUM SERPL-MCNC: 8.3 MG/DL (ref 8.4–10.2)
CALCIUM SERPL-MCNC: 8.5 MG/DL (ref 8.4–10.2)
CALCIUM SERPL-MCNC: 9.1 MG/DL (ref 8.4–10.2)
CALCIUM SERPL-MCNC: 9.5 MG/DL (ref 8.4–10.2)
CHLORIDE SERPL-SCNC: 102 MMOL/L (ref 96–108)
CHLORIDE SERPL-SCNC: 104 MMOL/L (ref 96–108)
CHLORIDE SERPL-SCNC: 97 MMOL/L (ref 96–108)
CHLORIDE SERPL-SCNC: 98 MMOL/L (ref 96–108)
CHOLEST SERPL-MCNC: 216 MG/DL (ref ?–200)
CO2 SERPL-SCNC: 21 MMOL/L (ref 21–32)
CO2 SERPL-SCNC: 25 MMOL/L (ref 21–32)
CO2 SERPL-SCNC: 32 MMOL/L (ref 21–32)
CO2 SERPL-SCNC: 32 MMOL/L (ref 21–32)
CREAT SERPL-MCNC: 1.67 MG/DL (ref 0.6–1.3)
CREAT SERPL-MCNC: 1.69 MG/DL (ref 0.6–1.3)
CREAT SERPL-MCNC: 1.79 MG/DL (ref 0.6–1.3)
CREAT SERPL-MCNC: 1.8 MG/DL (ref 0.6–1.3)
ERYTHROCYTE [DISTWIDTH] IN BLOOD BY AUTOMATED COUNT: 13.9 % (ref 11.6–15.1)
EST. AVERAGE GLUCOSE BLD GHB EST-MCNC: 117 MG/DL
GFR SERPL CREATININE-BSD FRML MDRD: 40 ML/MIN/1.73SQ M
GFR SERPL CREATININE-BSD FRML MDRD: 40 ML/MIN/1.73SQ M
GFR SERPL CREATININE-BSD FRML MDRD: 43 ML/MIN/1.73SQ M
GFR SERPL CREATININE-BSD FRML MDRD: 44 ML/MIN/1.73SQ M
GLUCOSE SERPL-MCNC: 125 MG/DL (ref 65–140)
GLUCOSE SERPL-MCNC: 131 MG/DL (ref 65–140)
GLUCOSE SERPL-MCNC: 131 MG/DL (ref 65–140)
GLUCOSE SERPL-MCNC: 133 MG/DL (ref 65–140)
HBA1C MFR BLD: 5.7 %
HCO3 BLDA-SCNC: 22.7 MMOL/L (ref 22–28)
HCO3 BLDV-SCNC: 26.2 MMOL/L (ref 24–30)
HCO3 BLDV-SCNC: 27.2 MMOL/L (ref 24–30)
HCO3 BLDV-SCNC: 27.9 MMOL/L (ref 24–30)
HCT VFR BLD AUTO: 47.9 % (ref 36.5–49.3)
HDLC SERPL-MCNC: 55 MG/DL
HGB BLD-MCNC: 15.6 G/DL (ref 12–17)
LACTATE SERPL-SCNC: 1.7 MMOL/L (ref 0.5–2)
LDLC SERPL CALC-MCNC: 141 MG/DL (ref 0–100)
MAGNESIUM SERPL-MCNC: 1.9 MG/DL (ref 1.9–2.7)
MAGNESIUM SERPL-MCNC: 2 MG/DL (ref 1.9–2.7)
MAGNESIUM SERPL-MCNC: 2.3 MG/DL (ref 1.9–2.7)
MAGNESIUM SERPL-MCNC: 2.4 MG/DL (ref 1.9–2.7)
MCH RBC QN AUTO: 29.8 PG (ref 26.8–34.3)
MCHC RBC AUTO-ENTMCNC: 32.6 G/DL (ref 31.4–37.4)
MCV RBC AUTO: 92 FL (ref 82–98)
NASAL CANNULA: 6
NASAL CANNULA: 6
O2 CT BLDA-SCNC: 22.2 ML/DL (ref 16–23)
O2 CT BLDV-SCNC: 14.4 ML/DL
O2 CT BLDV-SCNC: 14.6 ML/DL
O2 CT BLDV-SCNC: 14.7 ML/DL
OXYHGB MFR BLDA: 97.2 % (ref 94–97)
PCO2 BLD: 50.2 MM HG (ref 42–50)
PCO2 BLDA: 36.1 MM HG (ref 36–44)
PCO2 BLDV: 45.6 MM HG (ref 42–50)
PCO2 BLDV: 47.5 MM HG (ref 42–50)
PCO2 BLDV: 48.5 MM HG (ref 42–50)
PH BLD: 7.36 [PH] (ref 7.3–7.4)
PH BLDA: 7.42 [PH] (ref 7.35–7.45)
PH BLDV: 7.36 [PH] (ref 7.3–7.4)
PH BLDV: 7.37 [PH] (ref 7.3–7.4)
PH BLDV: 7.41 [PH] (ref 7.3–7.4)
PHOSPHATE SERPL-MCNC: 5.3 MG/DL (ref 2.7–4.5)
PLATELET # BLD AUTO: 257 THOUSANDS/UL (ref 149–390)
PMV BLD AUTO: 10.6 FL (ref 8.9–12.7)
PO2 BLDA: 131.1 MM HG (ref 75–129)
PO2 BLDV: 35.3 MM HG (ref 35–45)
PO2 BLDV: 35.6 MM HG (ref 35–45)
PO2 BLDV: 35.8 MM HG (ref 35–45)
PO2 VENOUS TEMP CORRECTED: 37.9 MM HG (ref 35–45)
POTASSIUM SERPL-SCNC: 3.4 MMOL/L (ref 3.5–5.3)
POTASSIUM SERPL-SCNC: 3.9 MMOL/L (ref 3.5–5.3)
POTASSIUM SERPL-SCNC: 4.1 MMOL/L (ref 3.5–5.3)
POTASSIUM SERPL-SCNC: 4.1 MMOL/L (ref 3.5–5.3)
PROT SERPL-MCNC: 6.7 G/DL (ref 6.4–8.4)
PROT SERPL-MCNC: 6.8 G/DL (ref 6.4–8.4)
RBC # BLD AUTO: 5.23 MILLION/UL (ref 3.88–5.62)
SODIUM SERPL-SCNC: 138 MMOL/L (ref 135–147)
SODIUM SERPL-SCNC: 139 MMOL/L (ref 135–147)
SODIUM SERPL-SCNC: 139 MMOL/L (ref 135–147)
SODIUM SERPL-SCNC: 140 MMOL/L (ref 135–147)
SPECIMEN SOURCE: ABNORMAL
TRIGL SERPL-MCNC: 100 MG/DL (ref ?–150)
WBC # BLD AUTO: 9.73 THOUSAND/UL (ref 4.31–10.16)

## 2025-03-03 PROCEDURE — 93005 ELECTROCARDIOGRAM TRACING: CPT

## 2025-03-03 PROCEDURE — 82330 ASSAY OF CALCIUM: CPT | Performed by: STUDENT IN AN ORGANIZED HEALTH CARE EDUCATION/TRAINING PROGRAM

## 2025-03-03 PROCEDURE — 83036 HEMOGLOBIN GLYCOSYLATED A1C: CPT | Performed by: PHYSICIAN ASSISTANT

## 2025-03-03 PROCEDURE — 93926 LOWER EXTREMITY STUDY: CPT

## 2025-03-03 PROCEDURE — 80061 LIPID PANEL: CPT | Performed by: PHYSICIAN ASSISTANT

## 2025-03-03 PROCEDURE — 84100 ASSAY OF PHOSPHORUS: CPT | Performed by: STUDENT IN AN ORGANIZED HEALTH CARE EDUCATION/TRAINING PROGRAM

## 2025-03-03 PROCEDURE — 83735 ASSAY OF MAGNESIUM: CPT | Performed by: PHYSICIAN ASSISTANT

## 2025-03-03 PROCEDURE — NC001 PR NO CHARGE: Performed by: INTERNAL MEDICINE

## 2025-03-03 PROCEDURE — 80076 HEPATIC FUNCTION PANEL: CPT | Performed by: INTERNAL MEDICINE

## 2025-03-03 PROCEDURE — 82805 BLOOD GASES W/O2 SATURATION: CPT | Performed by: PHYSICIAN ASSISTANT

## 2025-03-03 PROCEDURE — 85730 THROMBOPLASTIN TIME PARTIAL: CPT | Performed by: PHYSICIAN ASSISTANT

## 2025-03-03 PROCEDURE — 85730 THROMBOPLASTIN TIME PARTIAL: CPT | Performed by: STUDENT IN AN ORGANIZED HEALTH CARE EDUCATION/TRAINING PROGRAM

## 2025-03-03 PROCEDURE — 80076 HEPATIC FUNCTION PANEL: CPT | Performed by: PHYSICIAN ASSISTANT

## 2025-03-03 PROCEDURE — 99255 IP/OBS CONSLTJ NEW/EST HI 80: CPT | Performed by: STUDENT IN AN ORGANIZED HEALTH CARE EDUCATION/TRAINING PROGRAM

## 2025-03-03 PROCEDURE — 99233 SBSQ HOSP IP/OBS HIGH 50: CPT | Performed by: STUDENT IN AN ORGANIZED HEALTH CARE EDUCATION/TRAINING PROGRAM

## 2025-03-03 PROCEDURE — 83605 ASSAY OF LACTIC ACID: CPT | Performed by: PHYSICIAN ASSISTANT

## 2025-03-03 PROCEDURE — 93979 VASCULAR STUDY: CPT

## 2025-03-03 PROCEDURE — 82805 BLOOD GASES W/O2 SATURATION: CPT | Performed by: STUDENT IN AN ORGANIZED HEALTH CARE EDUCATION/TRAINING PROGRAM

## 2025-03-03 PROCEDURE — 85027 COMPLETE CBC AUTOMATED: CPT | Performed by: STUDENT IN AN ORGANIZED HEALTH CARE EDUCATION/TRAINING PROGRAM

## 2025-03-03 PROCEDURE — 80048 BASIC METABOLIC PNL TOTAL CA: CPT | Performed by: PHYSICIAN ASSISTANT

## 2025-03-03 RX ORDER — CALCIUM GLUCONATE 20 MG/ML
2 INJECTION, SOLUTION INTRAVENOUS ONCE
Status: COMPLETED | OUTPATIENT
Start: 2025-03-03 | End: 2025-03-03

## 2025-03-03 RX ORDER — MAGNESIUM SULFATE HEPTAHYDRATE 40 MG/ML
2 INJECTION, SOLUTION INTRAVENOUS ONCE
Status: COMPLETED | OUTPATIENT
Start: 2025-03-03 | End: 2025-03-03

## 2025-03-03 RX ORDER — POTASSIUM CHLORIDE 1500 MG/1
20 TABLET, EXTENDED RELEASE ORAL ONCE
Status: COMPLETED | OUTPATIENT
Start: 2025-03-03 | End: 2025-03-03

## 2025-03-03 RX ORDER — POTASSIUM CHLORIDE 29.8 MG/ML
40 INJECTION INTRAVENOUS ONCE
Status: COMPLETED | OUTPATIENT
Start: 2025-03-03 | End: 2025-03-04

## 2025-03-03 RX ADMIN — MILRINONE LACTATE IN DEXTROSE 0.25 MCG/KG/MIN: 200 INJECTION, SOLUTION INTRAVENOUS at 05:56

## 2025-03-03 RX ADMIN — SENNOSIDES AND DOCUSATE SODIUM 1 TABLET: 50; 8.6 TABLET ORAL at 08:49

## 2025-03-03 RX ADMIN — TICAGRELOR 180 MG: 90 TABLET ORAL at 21:46

## 2025-03-03 RX ADMIN — PREDNISONE 5 MG: 10 TABLET ORAL at 21:46

## 2025-03-03 RX ADMIN — MAGNESIUM SULFATE HEPTAHYDRATE 2 G: 40 INJECTION, SOLUTION INTRAVENOUS at 06:40

## 2025-03-03 RX ADMIN — POTASSIUM CHLORIDE 20 MEQ: 1500 TABLET, EXTENDED RELEASE ORAL at 06:40

## 2025-03-03 RX ADMIN — CHLORHEXIDINE GLUCONATE 15 ML: 1.2 SOLUTION ORAL at 08:50

## 2025-03-03 RX ADMIN — HYDROMORPHONE HYDROCHLORIDE 0.2 MG: 0.2 INJECTION, SOLUTION INTRAMUSCULAR; INTRAVENOUS; SUBCUTANEOUS at 18:19

## 2025-03-03 RX ADMIN — Medication 2 MG/HR: at 05:56

## 2025-03-03 RX ADMIN — POTASSIUM CHLORIDE 40 MEQ: 29.8 INJECTION, SOLUTION INTRAVENOUS at 19:27

## 2025-03-03 RX ADMIN — HEPARIN SODIUM 12 UNITS/KG/HR: 10000 INJECTION, SOLUTION INTRAVENOUS at 22:09

## 2025-03-03 RX ADMIN — ASPIRIN 81 MG: 81 TABLET, CHEWABLE ORAL at 08:50

## 2025-03-03 RX ADMIN — Medication 2 MG/HR: at 00:12

## 2025-03-03 RX ADMIN — CALCIUM GLUCONATE 2 G: 20 INJECTION, SOLUTION INTRAVENOUS at 08:46

## 2025-03-03 RX ADMIN — PREDNISONE 10 MG: 10 TABLET ORAL at 05:56

## 2025-03-03 RX ADMIN — CHLORHEXIDINE GLUCONATE 15 ML: 1.2 SOLUTION ORAL at 21:46

## 2025-03-03 NOTE — ASSESSMENT & PLAN NOTE
Previous hx of stents to LAD per wife - stopped taking medications 4-5 years ago  3/2 cardiac cath:   100 % proximal LAD occlusion at the site of previous stenting . There is also a 70% ostail LAD lesion.  Large L Cx artery with a 90 % ostial lesion.   100 % RCA occlusion proximally.  ASA. Statin. Heparin infusion.  CTS consult in AM

## 2025-03-03 NOTE — PROGRESS NOTES
Progress Note - Critical Care/ICU   Name: Rodney Pizarro 60 y.o. male I MRN: [id number]  Unit/Bed#: PPHP-312-01 I Date of Admission: 3/2/2025   Date of Service: 3/3/2025 I Hospital Day: 1      Assessment & Plan  Cardiogenic shock (HCC)  Presented to [facility] with chest pain, diaphoresis, nausea  EKG without obvious acute ischemic changes   STAT TTE from 3/2:  Left Ventricle: Left ventricular cavity size is dilated with LVEDD of 6.5 cm. 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 No apical thrombus visualized. The left ventricular ejection fraction is 15-20%. Systolic function is severely reduced. There is severe global hypokinesis with apical aneurysmal dilatation and akinesis of the mid to apical anterior region. There is thinning and akinesia in the basal to mid inferior consistent with transmural myocardial infarction. Diastolic function is severely abnormal, consistent with Grade III restrictive diastolic dysfunction.  IVS: There is abnormal septal motion of unclear etiology.  Right Ventricle: Systolic function is moderately reduced.  Left Atrium: The atrium is severely dilated.  Aortic Valve: The aortic valve is trileaflet. The leaflets are severely thickened. The leaflets are severely calcified. There is severely reduced mobility. Low flow, low gradient aortic stenosis cannot be excluded. There is moderate regurgitation.  Mitral Valve: There is moderate annular calcification. There is moderate to severe regurgitation with an eccentrically anteriorly directed jet.  Aorta: The aortic root is mildly dilated. The ascending aorta is normal in size.  Does have known hx of PCI to LAD 2014 but no known hx of heart failure but did used to take 80mg of IV lasix  Initial lactate 2.4, ScvO2 59, calculated Stefani CI of 1.5    Plan:  Discussed with cardiology and interventional cardiology  Taken to the cath lab where he was found to have LAD, LCx and RCA disease  No IABP placed due to moderate AI  Initially, on levophed for BP support  but weaned to off once milrinone started  Milrinone 0.25  q6hr VBG and BMP/Mg  Diuresis with bumex gtt   Goal net netagive  Heart failure consultation   Strict I&O's  Unstable angina (HCC)  Ongoing chest pain 8/10 crushing/burning chest pain on arrival to [facility]  SBP prohibitive of initiating nitro gtt  Dilaudid PRN for pain control   Coronary artery disease involving native coronary artery of native heart with unstable angina pectoris (HCC)  Previous hx of stents to LAD per wife - stopped taking medications 4-5 years ago  3/2 cardiac cath:   100 % proximal LAD occlusion at the site of previous stenting . There is also a 70% ostail LAD lesion.  Large L Cx artery with a 90 % ostial lesion.   100 % RCA occlusion proximally.  ASA. Statin. Heparin infusion.  CTS consult in AM  Wegener's granulomatosis with renal involvement (HCC)  On daily prednisone per pt - continue as ordered  10mg Qam  5mg QHS  Acute kidney injury (HCC)  Per family, no hx of CKD  Initial Cr 1.6 on arrival  Bumex bolus and gtt initiated  Strict I&O's  q6 BMP's  Avoid nephrotoxic agents  Severe mitral regurgitation  Volume removal and medical management  CTS consult   Moderate aortic insufficiency  Volume removal and medical management  CTS consult     Disposition: Critical care    ICU Core Measures     A: Assess, Prevent, and Manage Pain Has pain been assessed? Yes  Need for changes to pain regimen? No   B: Both SAT/SAT  N/A   C: Choice of Sedation RASS Goal: N/A patient not on sedation  Need for changes to sedation or analgesia regimen? NA   D: Delirium CAM-ICU: Negative   E: Early Mobility  Plan for early mobility? Yes   F: Family Engagement Plan for family engagement today? Yes       Review of Invasive Devices:    Covarrubias Plan: Continue for accurate I/O monitoring for 48 hours  Central access plan: Medications requiring central line  [place] Plan: Keep arterial line for hemodynamic monitoring    Prophylaxis:  VTE VTE covered by:  heparin (porcine),  Intravenous, 12 Units/kg/hr at 03/02/25 1952  heparin (porcine), Intravenous  heparin (porcine), Intravenous       Stress Ulcer  not ordered         24 Hour Events : 1 episode of NSVT. Given Mag with no recurrence.     Subjective   Review of Systems: See HPI for Review of Systems    Objective :                   Vitals I/O      Most Recent Min/Max in 24hrs   Temp 99.5 °F (37.5 °C) Temp  Min: 97.9 °F (36.6 °C)  Max: 99.5 °F (37.5 °C)   Pulse (!) 112 Pulse  Min: 81  Max: 112   Resp (!) 32 Resp  Min: 17  Max: 37   BP 96/69 BP  Min: 83/63  Max: 125/78   O2 Sat 98 % SpO2  Min: 96 %  Max: 100 %      Intake/Output Summary (Last 24 hours) at 3/3/2025 0526  Last data filed at 3/3/2025 0200  Gross per 24 hour   Intake 305.04 ml   Output 3700 ml   Net -3394.96 ml       Diet NPO; Sips with meds    Invasive Monitoring   Arterial Line  [place] /66  Arterial Line BP  Min: 86/55  Max: 125/78   MAP 80 mmHg  Arterial Line MAP (mmHg)  Min: 69 mmHg  Max: 94 mmHg           Physical Exam   Physical Exam  Vitals reviewed.   Skin:     General: Skin is cool and dry.   HENT:      Head: Atraumatic.   Neck:      Vascular: Central line present.   Cardiovascular:      Rate and Rhythm: Regular rhythm. Tachycardia present.      Heart sounds: Murmur heard.      No friction rub.   Constitutional:       General: He is not in acute distress.     Appearance: He is well-developed.   Pulmonary:      Effort: Pulmonary effort is normal. No respiratory distress.      Breath sounds: Normal breath sounds.   Neurological:      General: No focal deficit present.      Mental Status: He is alert and oriented to person, place and time.   Genitourinary/Anorectal:  Covarrubias present.        Diagnostic Studies        Lab Results: I have reviewed the following results:     Medications:  Scheduled PRN   aspirin, 81 mg, Daily  atorvastatin, 80 mg, Daily With Dinner  chlorhexidine, 15 mL, Q12H ANDRESSA  predniSONE, 10 mg, Early Morning  predniSONE, 5 mg, HS  senna-docusate  sodium, 1 tablet, BID      bisacodyl, 10 mg, Daily PRN  heparin (porcine), 2,000 Units, Q6H PRN  heparin (porcine), 4,000 Units, Q6H PRN  HYDROmorphone, 0.2 mg, Q2H PRN   Or  HYDROmorphone, 0.5 mg, Q2H PRN  ondansetron, 4 mg, Q6H PRN  trimethobenzamide, 200 mg, Q6H PRN       Continuous    bumetanide (BUMEX) 12.5 mg infusion 50 mL, 2 mg/hr, Last Rate: 2 mg/hr (03/03/25 0012)  heparin (porcine), 3-20 Units/kg/hr (Order-Specific), Last Rate: 12 Units/kg/hr (03/02/25 1952)  milrinone (Primacor) infusion, 0.25 mcg/kg/min, Last Rate: 0.25 mcg/kg/min (03/02/25 1752)  norepinephrine, 1-30 mcg/min, Last Rate: Stopped (03/02/25 1927)         Labs:   CBC    Recent Labs     03/02/25  0715 03/03/25  0500   WBC 13.19* 9.73   HGB 15.9 15.6   HCT 49.4* 47.9    257     BMP    Recent Labs     03/02/25  1756 03/02/25  2344   SODIUM 140 138   K 4.6 4.1    104   CO2 21 21   AGAP 13 13   BUN 27* 26*   CREATININE 1.65* 1.67*   CALCIUM 8.3* 8.3*       Coags    Recent Labs     03/02/25  0947 03/02/25  1406 03/02/25  1918 03/03/25  0306   INR 0.98  --   --   --    PTT 27   < > 44* 77*    < > = values in this interval not displayed.        Additional Electrolytes  Recent Labs     03/02/25  1406 03/02/25  2344 03/03/25  0500   MG 1.9 2.3  --    PHOS 4.3  --   --    CAIONIZED  --   --  1.03*          Blood Gas    Recent Labs     03/03/25  0501   PHART 7.417   ZFF2REE 36.1   PO2ART 131.1*   [id number] 22.7   BEART -1.2   SOURCE Line, Arterial     Recent Labs     03/03/25  0500 03/03/25  0501   PHVEN 7.360  --    SPV3FCA 47.5  --    PO2VEN 35.6  --    OSX5YSV 26.2  --    BEVEN 0.1  --    Y1IVZUE 63.1  --    SOURCE  --  Line, Arterial    LFTs  Recent Labs     03/02/25  0715   ALT 37   AST 29   ALKPHOS 47   ALB 4.0   TBILI 1.33*       Infectious  No recent results  Glucose  Recent Labs     03/02/25  0715 03/02/25  1406 03/02/25  1756 03/02/25  2344   GLUC 134 121 113 133

## 2025-03-03 NOTE — CONSULTS
Consultation - Palliative Care   Name: Rodney Pizarro 60 y.o. male I MRN: [id number]  Unit/Bed#: PPHP-312-01 I Date of Admission: 3/2/2025   Date of Service: 3/3/2025 I Hospital Day: 1   Inpatient consult to Palliative Care  Consult performed by: Adriana Amador DO  Consult ordered by: Rosa Espinoza PA-C      Physician Requesting Evaluation: Micha Veliz DO   Reason for Evaluation / Principal Problem: Goals of care in setting of LVAD evaluation    Assessment & Plan  Palliative care encounter  Palliative Diagnosis: Multivessel disease and ischemic cardiomyopathy 15 to 20%.  Due to extensive multivessel and questionable myocardial viability, would need a massive operation that is likely unsurvivable per cardiothoracic surgery.  Evaluated by CTS and future goal would be a workup for heart transplant.  Suspect he would need LVAD consideration as bridge therapy to heart transplant.    Goals:   Level 1- full code.  Remains treatment focused and wants to do what ever it takes to live longer.  States that he was told he would have 6 months or less to live when he was first diagnosed with Wegener's in 2008 and therefore is not fearful of death.  While he understands that he has a complicated medical history, he wants to be able to receive treatment if it will help lengthen his life.    Social Support:  Patient's support system: Spouse, has 2 adult children.  He lives at home with his spouse and adult children.  Has 2 grandchildren living at home as well. Extensive family support.  Supportive listening provided  Normalized experience of patient  Advocated for patient/family with interdisciplinary team    Care Coordination  Case discussed with primary team and advanced heart failure LCSW.    Follow-up  Will engage [facility] social work to assist in completing ACP documentation.  We appreciate the opportunity to participate in this patient's care.   We will continue to follow while admitted.    Palliative will follow for  ongoing goals of care discussions as situation evolves.  Please do not hesitate to contact our on-call provider through EP[employer] Secure Chat or contact [phone] should there be an acute change or other symptom control concerns.    Cardiogenic shock (HCC)  Admitted to ICU.  Coronary artery disease involving native coronary artery of native heart with unstable angina pectoris (HCC)    Wegener's granulomatosis with renal involvement (HCC)    Severe mitral regurgitation    Moderate aortic insufficiency    I have discussed the above management plan in detail with the primary service.     Decisional apparatus: Patient is competent on my exam today. If competence is lost, patient's substitute decision maker would default to spouse by [place] Act 169.   Advance Directive / Living Will / POLST: Will work on completing ACP documentation during this hospitalization.     PDMP Review: I have reviewed the patient's controlled substance dispensing history in the Prescription Drug Monitoring Program in compliance with the [facility] regulations before prescribing any controlled substances.    History of Present Illness   HPI: Rodney Pizarro is a 60 y.o. year old male who initially presented to [facility] 2/2 NSTEMI and was transferred to [facility] for higher level of care including high risk revascularization.  He has a complicated and extensive medical history including PMHx of CAD s/p PCI in 2014 in [place] and Wegener's granulomatosis.  He has not seen a cardiologist in at least 8 years and was not on any cardiac medication.  He is was a tobacco user up until day of admission and has agreed not to smoke anymore.    He is  to his wife (Evi) of 37 years and has 2 adult children-who all live with him.  He does not currently work since he and his wife took care of their 2 grandchildren daily.  He understands his heart is not functioning properly and he needs a transplant.  He is aware that he is to wait 6 months before he can be a candidate  for heart transplant.    Due to the advanced and end-stage nature of this patient's heart failure, an LVAD has been considered.  In addition to the possibility of treatment with LVAD, hospice cares are also considered, as the patient meets medical criteria for hospice eligibility due to the advanced nature of the patient's heart disease.  Given these options, the pt wishes to further consider full treatment focused care inclusive of possible LVAD consideration as a bridge to a heart transplant.    Pre-existing non-cardiac conditions:  -Wegener's granulomatosis, had been treated with prednisone  -Previously treated for anxiety/depression but no longer needed treatment    Testing Understanding:  - Pt expectation of benefits of LVAD - he suspects that this device will allow him to live until he can obtain a heart transplant.  He understands that he is not eligible for a heart transplant until 6 months have passed due to his tobacco history.  - Pt expectation of risks and sacrifices of getting/having LVAD -he understands that this is life-changing and he would need to have a good support.  He and his wife assure me that he has extensive family support as a relates to transportation and serious illness support.  - Pt understanding of risks and consequences of foregoing LVAD - he understands that this implanted device would be a bridge to ultimately getting a heart transplant and that if he were not to obtain one, he would die.    Supports Inventory:  Patient lives at home with his wife and 2 adult children.  His adult children provide financial support since patient and his wife do not work.  He does not report of any issues with electricity.  Has strong support from family and will not have any transportation issues.    Emotional Readiness:  - Pt thoughts and preferences re: death or serious illness -there is no ACP documentation on file but patient has had experience with serious illness.  When he was initially  diagnosed with Wegener's granulomatosis he was told that he had 6 months or less to live.  At that time, he states that he was prepared to die if treatment did not work.  However, he was grateful and happy that his Wegener's treatment was successful.  - Pt's HealthCare Agent or Health Care Representative, and their knowledge and participation in case and cares -although he does not have ACP documentation completed, patient states that his wife would make medical decisions on his behalf if he were not able to do so anymore.    Financial Resources and Limitations:  - Income streams - patient does not work.  All income is from his adult children.  - Insurance status -does not currently have any insurance.  In the process of applying to Medicaid with assistance of [employer].  - Citizenship status -not a [place] citizen.  Has green card application that is in review.    Review of Systems   Constitutional:  Positive for fatigue. Negative for appetite change.   Respiratory:  Negative for cough and shortness of breath.    Cardiovascular:  Negative for chest pain and palpitations.   Gastrointestinal:  Negative for abdominal pain, diarrhea, nausea and vomiting.   Neurological:  Negative for dizziness, light-headedness and headaches.     Medical History Review: I have reviewed the patient's PMH, PSH, Social History, Family History, Meds, and Allergies     Objective :  Temp:  [97.9 °F (36.6 °C)-100 °F (37.8 °C)] 100 °F (37.8 °C)  HR:  [] 107  BP: ()/(56-86) 109/70  Resp:  [17-46] 28  SpO2:  [95 %-100 %] 97 %  O2 Device: Nasal cannula  Nasal Cannula O2 Flow Rate (L/min):  [2 L/min-6 L/min] 6 L/min    Physical Exam  Vitals reviewed.   Constitutional:       General: He is not in acute distress.     Appearance: Normal appearance. He is not ill-appearing.      Comments: Slightly drowsy   HENT:      Head: Normocephalic.   Eyes:      Conjunctiva/sclera: Conjunctivae normal.   Cardiovascular:      Rate and Rhythm: Regular rhythm.  "Tachycardia present.      Pulses: Normal pulses.      Heart sounds: Murmur heard.   Pulmonary:      Effort: Pulmonary effort is normal. No respiratory distress.   Abdominal:      General: Bowel sounds are normal.      Tenderness: There is no abdominal tenderness.   Musculoskeletal:      Right lower leg: No edema.      Left lower leg: No edema.   Skin:     General: Skin is warm and dry.   Neurological:      Mental Status: He is oriented to person, place, and time.   Psychiatric:         Mood and Affect: Mood normal.         Behavior: Behavior normal.         Thought Content: Thought content normal.         Judgment: Judgment normal.            Lab Results: I have reviewed the following results:  Lab Results   Component Value Date/Time    SODIUM 140 03/03/2025 05:00 AM    K 3.9 03/03/2025 05:00 AM    BUN 23 03/03/2025 05:00 AM    CREATININE 1.69 (H) 03/03/2025 05:00 AM    GLUC 125 03/03/2025 05:00 AM    CALCIUM 8.5 03/03/2025 05:00 AM     (H) 03/03/2025 05:00 AM     (H) 03/03/2025 05:00 AM    ALB 3.8 03/03/2025 05:00 AM    TP 6.8 03/03/2025 05:00 AM    EGFR 43 03/03/2025 05:00 AM     Lab Results   Component Value Date/Time    HGB 15.6 03/03/2025 05:00 AM    WBC 9.73 03/03/2025 05:00 AM     03/03/2025 05:00 AM    INR 0.98 03/02/2025 09:47 AM    PTT 87 (H) 03/03/2025 08:48 AM     No results found for: \"[id number]\"    Imaging Results Review: I reviewed radiology reports from this admission including: chest xray and CT chest.  Other Study Results Review: EKG was reviewed.  Other studies reviewed include: Echo    Code Status: Level 1 - Full Code  Advance Directive and Living Will:      Power of :    POLST:      Administrative Statements   I have spent a total time of 60 minutes in caring for this patient on the day of the visit/encounter including Diagnostic results, Risks and benefits of tx options, Patient and family education, Counseling / Coordination of care, Documenting in the medical " record, Reviewing/placing orders in the medical record (including tests, medications, and/or procedures), Obtaining or reviewing history  , and Communicating with other healthcare professionals .

## 2025-03-03 NOTE — PLAN OF CARE
Problem: CARDIOVASCULAR - ADULT  Goal: Absence of cardiac dysrhythmias or at baseline rhythm  Description: INTERVENTIONS:  - Continuous cardiac monitoring, vital signs, obtain 12 lead EKG if ordered  - Administer antiarrhythmic and heart rate control medications as ordered  - Monitor electrolytes and administer replacement therapy as ordered  Outcome: Progressing     Problem: Prexisting or High Potential for Compromised Skin Integrity  Goal: Skin integrity is maintained or improved  Description: INTERVENTIONS:  - Identify patients at risk for skin breakdown  - Assess and monitor skin integrity  - Assess and monitor nutrition and hydration status  - Monitor labs   - Assess for incontinence   - Turn and reposition patient  - Assist with mobility/ambulation  - Relieve pressure over bony prominences  - Avoid friction and shearing  - Provide appropriate hygiene as needed including keeping skin clean and dry  - Evaluate need for skin moisturizer/barrier cream  - Collaborate with interdisciplinary team   - Patient/family teaching  - Consider wound care consult   Outcome: Progressing

## 2025-03-03 NOTE — CONSULTS
Consultation - Cardiac Surgery   Rodney Pizarro 60 y.o. male MRN: [id number]  Unit/Bed#: PPHP-312-01 Encounter: [id number]    Physician Requesting Consult: Micha Veliz DO    Reason for Consult / Principal Problem: MR/AI/CAD    Inpatient consult to Cardiac Surgery  Consult performed by: Nick Neely PA-C  Consult ordered by: Mouna Rutledge PA-C        History of Present Illness: Rodney Pizarro is a 60 y.o. male with a cardiovascular past medical history significant for known coronary artery disease.  He previously underwent cardiac catheterization with PCI and 2011 and 2014.  This was completed in [place].  He has since relocated to [place].  He at that time was diagnosed with Wegener's granulomatosis.  He no longer works because of his medical diagnoses.  However he lost his insurance and does not follow routine with a primary care physician or a cardiologist.  He does not take any daily medications as well for concerns of insurance support.    His current clinical course began nearly 1 month ago.  At that time he began to note progressive exertional dyspnea.  He more recently noted exertional angina similar to his previous episodes which preempted cardiac catheterization.  He presented for evaluation treatment at [facility].  Cardiac enzymes were elevated and echocardiogram was completed.  His ejection fraction was found to be diminished in the 15 to 20% range.  He was also noted to have aortic insufficiency, aortic stenosis, and mitral regurgitation.  At this point is transferred to [facility] for cardiac surgery consultation.    During interview today he denies symptoms of recurrent angina since admission to the hospital.  He is currently supported with Primacor and for effusion and 0.25 mcg/kg/min.  His past medical history is otherwise significant for cardiogenic shock, Wegener's granulomatosis, coronary artery disease with previous stents to the LAD, and acute kidney  injury on this admission.    Past Medical History:  Past Medical History:   Diagnosis Date    Heart attack (HCC)     Wegener's granulomatosis with renal involvement (HCC)          Past Surgical History:   No past surgical history on file.      Family History:  No family history on file.      Social History:  Social History     Substance and Sexual Activity   Alcohol Use Never     Social History     Substance and Sexual Activity   Drug Use Never     Social History     Tobacco Use   Smoking Status Every Day    Current packs/day: 0.25    Types: Cigarettes   Smokeless Tobacco Never     Marital Status: /Civil Union      Home Medications:   Prior to Admission medications    Medication Sig Start Date End Date Taking? Authorizing Provider   colchicine (COLCRYS) 0.6 mg tablet Take 1 tablet (0.6 mg total) by mouth daily 5/25/23  Yes Tano Jack MD   ibuprofen (MOTRIN) 800 mg tablet Take by mouth every 6 (six) hours as needed for mild pain   Yes Historical Provider, MD   predniSONE 10 mg tablet Take 10 mg by mouth daily   Yes Historical Provider, MD   predniSONE 5 mg tablet Take 5 mg by mouth daily at bedtime   Yes Historical Provider, MD       Inpatient Medications:  Scheduled Meds:   Current Facility-Administered Medications   Medication Dose Route Frequency Provider Last Rate    aspirin  81 mg Oral Daily Kade Del Angel MD      atorvastatin  80 mg Oral Daily With Dinner Kade Del Angel MD      bisacodyl  10 mg Rectal Daily PRN Mouna Rutledge PA-C      bumetanide (BUMEX) 12.5 mg infusion 50 mL  1 mg/hr Intravenous Continuous Rosa Espinoza PA-C 1 mg/hr (03/03/25 0637)    calcium gluconate  2 g Intravenous Once Rosa Espinoza PA-C      chlorhexidine  15 mL Mouth/Throat Q12H ANDRESSA Kade Del Angel MD      heparin (porcine)  3-20 Units/kg/hr (Order-Specific) Intravenous Titrated Kade Del Angel MD 12 Units/kg/hr (03/02/25 1952)    heparin (porcine)  2,000 Units Intravenous Q6H PRN Kade Del Angel MD      heparin (porcine)  4,000  Units Intravenous Q6H PRN Kade Del Angel MD      HYDROmorphone  0.2 mg Intravenous Q2H PRN Jono Arce PA-C      Or    HYDROmorphone  0.5 mg Intravenous Q2H PRN Jono Arce PA-C      magnesium sulfate  2 g Intravenous Once Rosa Espinoza PA-C 2 g (03/03/25 0640)    milrinone (Primacor) infusion  0.25 mcg/kg/min Intravenous Continuous Kade Del Angel MD 0.25 mcg/kg/min (03/03/25 0556)    norepinephrine  1-30 mcg/min Intravenous Titrated Kade Del Angel MD Stopped (03/02/25 1927)    ondansetron  4 mg Intravenous Q6H PRN Kade Del Angel MD      predniSONE  10 mg Oral Early Morning Kade Del Angel MD      predniSONE  5 mg Oral HS Kade Del Angel MD      senna-docusate sodium  1 tablet Oral BID Mouna Rutledge PA-C      trimethobenzamide  200 mg Intramuscular Q6H PRN Kade Del Angel MD       Continuous Infusions: bumetanide (BUMEX) 12.5 mg infusion 50 mL, 1 mg/hr, Last Rate: 1 mg/hr (03/03/25 0637)  heparin (porcine), 3-20 Units/kg/hr (Order-Specific), Last Rate: 12 Units/kg/hr (03/02/25 1952)  milrinone (Primacor) infusion, 0.25 mcg/kg/min, Last Rate: 0.25 mcg/kg/min (03/03/25 0556)  norepinephrine, 1-30 mcg/min, Last Rate: Stopped (03/02/25 1927)      PRN Meds:  bisacodyl, 10 mg, Daily PRN  heparin (porcine), 2,000 Units, Q6H PRN  heparin (porcine), 4,000 Units, Q6H PRN  HYDROmorphone, 0.2 mg, Q2H PRN   Or  HYDROmorphone, 0.5 mg, Q2H PRN  ondansetron, 4 mg, Q6H PRN  trimethobenzamide, 200 mg, Q6H PRN        Allergies:  No Known Allergies    Review of Systems:  Review of Systems   Constitutional:  Positive for activity change, appetite change and fatigue.   HENT: Negative.     Eyes: Negative.    Respiratory:  Positive for chest tightness and shortness of breath.    Cardiovascular:  Positive for chest pain and leg swelling.   Endocrine: Negative.    Genitourinary: Negative.    Musculoskeletal: Negative.    Skin: Negative.    Neurological:  Positive for weakness.   Psychiatric/Behavioral: Negative.         Vital Signs:     Vitals:     03/03/25 0400 03/03/25 0500 03/03/25 0533 03/03/25 0600   BP: 100/76 94/69  99/74   BP Location:    Right arm   Pulse: (!) 112 (!) 109  (!) 113   Resp: (!) 32 (!) 23  (!) 46   Temp: 99.5 °F (37.5 °C) 99.7 °F (37.6 °C)  99.7 °F (37.6 °C)   TempSrc: Bladder      SpO2: 98% 98%  99%   Weight:   79.3 kg (174 lb 13.2 oz)      Invasive Devices       Central Venous Catheter Line  Duration             CVC Central Lines 03/02/25 Triple Right Internal jugular <1 day              Peripheral Intravenous Line  Duration             Peripheral IV 03/02/25 Right Antecubital 1 day    Peripheral IV 03/02/25 Left Antecubital <1 day              Arterial Line  Duration             Arterial Line 03/02/25 Left Radial <1 day              Line  Duration             Arterial Sheath 6 Fr. Right Femoral <1 day              Drain  Duration             Urethral Catheter <1 day                    Physical Exam:  Physical Exam  Constitutional:       Appearance: Normal appearance. He is well-developed.   HENT:      Head: Normocephalic and atraumatic.   Eyes:      Conjunctiva/sclera: Conjunctivae normal.   Neck:      Thyroid: No thyromegaly.      Vascular: No carotid bruit or JVD.      Trachea: No tracheal deviation.   Cardiovascular:      Rate and Rhythm: Normal rate and regular rhythm.      Pulses:           Carotid pulses are 2+ on the right side and 2+ on the left side.       Dorsalis pedis pulses are 2+ on the right side and 2+ on the left side.        Posterior tibial pulses are 2+ on the right side and 2+ on the left side.      Heart sounds: S1 normal and S2 normal. Murmur heard.   Pulmonary:      Effort: No accessory muscle usage or respiratory distress.      Breath sounds: No wheezing or rales.   Chest:      Chest wall: No tenderness.   Abdominal:      General: Bowel sounds are normal.      Palpations: Abdomen is soft.      Tenderness: There is no abdominal tenderness.   Musculoskeletal:         General: Normal range of motion.       "Cervical back: Full passive range of motion without pain and normal range of motion.      Right lower leg: Edema present.      Left lower leg: Edema present.   Skin:     General: Skin is warm and dry.   Neurological:      Mental Status: He is alert and oriented to person, place, and time.      Cranial Nerves: No cranial nerve deficit.      Sensory: No sensory deficit.   Psychiatric:         Speech: Speech normal.         Behavior: Behavior normal.         Lab Results:     Results from last 7 days   Lab Units 03/03/25  0500 03/02/25  0715   WBC Thousand/uL 9.73 13.19*   HEMOGLOBIN g/dL 15.6 15.9   HEMATOCRIT % 47.9 49.4*   PLATELETS Thousands/uL 257 256     Results from last 7 days   Lab Units 03/03/25  0500 03/02/25  2344 03/02/25  1756   POTASSIUM mmol/L 3.9 4.1 4.6   CHLORIDE mmol/L 102 104 106   CO2 mmol/L 25 21 21   BUN mg/dL 23 26* 27*   CREATININE mg/dL 1.69* 1.67* 1.65*   CALCIUM mg/dL 8.5 8.3* 8.3*     Results from last 7 days   Lab Units 03/03/25  0306 03/02/25  1918 03/02/25  1406 03/02/25  0947   INR   --   --   --  0.98   PTT seconds 77* 44* 102* 27     Lab Results   Component Value Date    HGBA1C 5.7 (H) 03/03/2025     No results found for: \"CKTOTAL\", \"CKMB\", \"CKMBINDEX\", \"TROPONINI\"    Imaging Studies:     Cardiac Catheterization: Completed; Report pending    Echocardiogram: EF 15-20%. Aortic valve with  severely reduced mobility. Low flow, low gradient aortic stenosis cannot be excluded. There is moderate regurgitation. Moderate MAC. Moderate to severe MR.      Results Review Statement: I reviewed radiology reports from this admission including: Echocardiogram.    Assessment:  Principal Problem:    Cardiogenic shock (HCC)  Active Problems:    Unstable angina (HCC)    Coronary artery disease involving native coronary artery of native heart with unstable angina pectoris (HCC)    Wegener's granulomatosis with renal involvement (HCC)    Acute kidney injury (HCC)    Severe mitral regurgitation    Moderate " aortic insufficiency    Severe coronary artery disease; Ongoing CABG workup  Severe aortic stenosis; Ongoing AVR workup  Severe aortic insufficiency; Ongoing AVR workup  Severe mitral stenosis; Ongoing MVR workup    Plan:  Echocardiogram was completed and severe cardiomyopathy with mitral digitation aortic insufficiency diagnoses were confirmed.  These diagnoses were shared with the patient.  Clinically the patient is in cardiogenic shock on continuous Primacor infusion.    Surgical risk would be prohibitively high in the face of cardiogenic shock, cardiomyopathy, multi valvular disease, and significant coronary disease.  Prior to surgical revascularization myocardium Home viability would need to be established.    Formal recommendation will follow with surgeon evaluation.  However, consideration of transfer to [facility] is an option as well in light of the above.    Rodney Mouraammed was comfortable with our recommendations, and their questions were answered to their satisfaction.  We will continue to evaluate the patient daily with further recommendations as work up is completed.  Thank you for allowing us to participate in the care of this patient.     SIGNATURE: Nick Neely PA-C  DATE: March 3, 2025  TIME: 7:42 AM    * This note was completed in part utilizing [employer] direct voice recognition software.   Grammatical errors, random word insertion, spelling mistakes, and incomplete sentences may be an occasional consequence of the system secondary to software limitations, ambient noise and hardware issues. At the time of dictation, efforts were made to edit, clarify and /or correct errors. Please read the chart carefully and recognize, using context, where substitutions have occurred.  If you have any questions or concerns about the context, text or information contained within the body of this dictation, please contact myself, the provider, for further clarification.

## 2025-03-03 NOTE — ASSESSMENT & PLAN NOTE
Presented to [facility] with chest pain, diaphoresis, nausea  EKG without obvious acute ischemic changes   STAT TTE from 3/2:  Left Ventricle: Left ventricular cavity size is dilated with LVEDD of 6.5 cm. 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 No apical thrombus visualized. The left ventricular ejection fraction is 15-20%. Systolic function is severely reduced. There is severe global hypokinesis with apical aneurysmal dilatation and akinesis of the mid to apical anterior region. There is thinning and akinesia in the basal to mid inferior consistent with transmural myocardial infarction. Diastolic function is severely abnormal, consistent with Grade III restrictive diastolic dysfunction.  IVS: There is abnormal septal motion of unclear etiology.  Right Ventricle: Systolic function is moderately reduced.  Left Atrium: The atrium is severely dilated.  Aortic Valve: The aortic valve is trileaflet. The leaflets are severely thickened. The leaflets are severely calcified. There is severely reduced mobility. Low flow, low gradient aortic stenosis cannot be excluded. There is moderate regurgitation.  Mitral Valve: There is moderate annular calcification. There is moderate to severe regurgitation with an eccentrically anteriorly directed jet.  Aorta: The aortic root is mildly dilated. The ascending aorta is normal in size.  Does have known hx of PCI to LAD 2014 but no known hx of heart failure but did used to take 80mg of IV lasix  Initial lactate 2.4, ScvO2 59, calculated Stefani CI of 1.5    Plan:  Discussed with cardiology and interventional cardiology  Taken to the cath lab where he was found to have LAD, LCx and RCA disease  No IABP placed due to moderate AI  Initially, on levophed for BP support but weaned to off once milrinone started  Milrinone 0.25  q6hr VBG and BMP/Mg  Diuresis with bumex gtt   Goal net netagive  Heart failure consultation   Strict I&O's

## 2025-03-03 NOTE — UTILIZATION REVIEW
Initial Clinical Review    Admission: Date/Time/Statement:   Admission Orders (From admission, onward)       Ordered        03/02/25 1359  Inpatient Admission  Once                          Orders Placed This Encounter   Procedures    Inpatient Admission     Standing Status:   Standing     Number of Occurrences:   1     Level of Care:   Critical Care [15]     Estimated length of stay:   More than 2 Midnights     Certification:   I certify that inpatient services are medically necessary for this patient for a duration of greater than two midnights. See H&P and MD Progress Notes for additional information about the patient's course of treatment.     Initial Presentation: 60 y.o. male with PMHx including Wegener's granulomatosis, CAD with previous stents to LAD, and gout, presented on 3/2/25 initially to [facility] then transferred to [facility], admitted Inpatient status dt Cardiogenic shock, Unstable Angina.  Presented due to sudden chest pain and heaviness which woke him in the morning with associated shortness of breath, diaphoresis, nausea and vomiting. In [place] ED, EKG did not show any evidence of acute ischemia but a stat echo showed an EF of 20% with G3 diastolic dysfunction. Patient with relative hypotension and mild lactic acidemia so he was transferred to [facility] for HF consultation.     Plan:  Admit to Critical Care :  Cardiology and Heart Failure consults, treat as NSTEMI, start heparin drip and ASA, Levophed, VBGs q6h, lactate and BMP, start Bumex drip, strict IO, pain control prn.    3/2 Per Cardiology: Cardiac cath performed upon arrival at [facility]. Cath revealed 100 % proximal LAD occlusion at the site of previous stenting . There is also a 70% ostail LAD lesion.  Large L Cx artery with a 90 % ostial lesion. 100 % RCA occlusion proximally.     3/2 Per Heart Failure: Continue current inotropic and vasopressor support. Trend endpoints every 6 hours. Continue  diuresis for goal net negative 1-2 L/ 24 hours. Continue aspirin, heparin gtt, statin.    Date: 3/3   Day 2: One episode of NSVT overnight. Given mag with no recurrence. Maintain Jean and Covarrubias. Cardiology, Cardiac Sx following, continue Milrinone, freq labs, Bumex and Heparin drips, monitor volume status. Cardiac Sx consult placed.     3/2 Per Cardiac Sx: It is highly unlikely he has any myocardial viability of the anterior wall and even if he did would require a massive operation that in his situation is likely unsurvivable. He likely has an unsalvageable heart and will likely need to be worked up for heart transplant. He is currently stable in the ICU. Ultimately, after discussion with my partners, I recommend transfer to [facility], given his extreme high risk and potential need for heart transplant.     Scheduled Medications:  aspirin, 81 mg, Oral, Daily  atorvastatin, 80 mg, Oral, Daily With Dinner  calcium gluconate, 2 g, Intravenous, Once  chlorhexidine, 15 mL, Mouth/Throat, Q12H ANDRESSA  magnesium sulfate, 2 g, Intravenous, Once  predniSONE, 10 mg, Oral, Early Morning  predniSONE, 5 mg, Oral, HS  senna-docusate sodium, 1 tablet, Oral, BID      Continuous IV Infusions:  bumetanide (BUMEX) 12.5 mg infusion 50 mL, 1 mg/hr, Intravenous, Continuous  heparin (porcine), 3-20 Units/kg/hr (Order-Specific), Intravenous, Titrated  milrinone (Primacor) infusion, 0.25 mcg/kg/min, Intravenous, Continuous  norepinephrine, 1-30 mcg/min, Intravenous, Titrated      PRN Meds:  bisacodyl, 10 mg, Rectal, Daily PRN  heparin (porcine), 2,000 Units, Intravenous, Q6H PRN  heparin (porcine), 4,000 Units, Intravenous, Q6H PRN  HYDROmorphone, 0.2 mg, Intravenous, Q2H PRN   Or  HYDROmorphone, 0.5 mg, Intravenous, Q2H PRN  ondansetron, 4 mg, Intravenous, Q6H PRN x1  trimethobenzamide, 200 mg, Intramuscular, Q6H PRN      ED Triage Vitals   Temperature Pulse Respirations Blood Pressure SpO2 Pain Score   03/02/25 1400 03/02/25 1400 03/02/25 1400  03/02/25 1400 03/02/25 1400 03/02/25 1414   98.6 °F (37 °C) 98 19 93/67 99 % 8     Weight (last 2 days)       Date/Time Weight    03/03/25 0533 79.3 (174.83)            Vital Signs (last 3 days)       Date/Time Temp Pulse Resp BP MAP (mmHg) Arterial Line BP MAP SpO2 Calculated FIO2 (%) - Nasal Cannula Nasal Cannula O2 Flow Rate (L/min) O2 Device Patient Position - Orthostatic VS [place] Coma Scale Score Pain    03/03/25 0600 99.7 °F (37.6 °C) 113 46 99/74 83 119/75 90 mmHg 99 % 44 6 L/min Nasal cannula Lying -- --    03/03/25 0500 99.7 °F (37.6 °C) 109 23 94/69 78 108/69 83 mmHg 98 % -- -- -- -- -- --    03/03/25 0400 99.5 °F (37.5 °C) 112 32 100/76 84 110/69 83 mmHg 98 % 44 6 L/min Nasal cannula -- 15 --    03/03/25 0300 99.1 °F (37.3 °C) 109 30 96/69 79 -- -- 97 % 44 6 L/min Nasal cannula -- -- --    03/03/25 0200 99.1 °F (37.3 °C) 110 25 109/76 87 -- -- 97 % 44 6 L/min Nasal cannula -- -- --    03/03/25 0100 99 °F (37.2 °C) 108 20 106/79 88 104/66 80 mmHg 97 % -- -- -- -- -- --    03/03/25 0000 99 °F (37.2 °C) 108 22 100/74 83 107/70 83 mmHg 97 % -- -- -- -- 15 --    03/02/25 2300 98.8 °F (37.1 °C) 109 19 109/73 87 115/73 87 mmHg 98 % 44 6 L/min Nasal cannula -- -- --    03/02/25 2200 98.4 °F (36.9 °C) 104 23 104/73 84 106/67 81 mmHg 98 % 44 6 L/min Nasal cannula Lying -- --    03/02/25 2100 98.2 °F (36.8 °C) 111 22 106/73 85 119/71 86 mmHg 96 % 44 6 L/min Nasal cannula Lying -- 3    03/02/25 2030 98.2 °F (36.8 °C) 107 25 -- -- 118/74 90 mmHg 97 % 44 6 L/min Nasal cannula -- -- --    03/02/25 2000 98.6 °F (37 °C) 107 24 112/70 85 118/74 89 mmHg 98 % 44 6 L/min Nasal cannula Lying 15 --    03/02/25 1930 98.8 °F (37.1 °C) 109 20 125/78 94 -- -- 99 % 44 6 L/min Nasal cannula -- -- --    03/02/25 1926 98.8 °F (37.1 °C) 109 24 -- -- 125/78 94 mmHg 99 % -- -- -- -- -- --    03/02/25 1900 98.8 °F (37.1 °C) 107 20 108/82 90 123/78 93 mmHg 100 % -- -- -- -- -- --    03/02/25 1845 98.6 °F (37 °C) 107 23 -- -- 121/76 92  mmHg 99 % -- -- -- -- -- --    03/02/25 1831 -- -- -- -- -- -- -- -- -- -- -- -- -- 8 03/02/25 1830 98.6 °F (37 °C) 110 23 -- -- 120/76 91 mmHg 100 % -- -- -- -- -- --    03/02/25 1815 98.6 °F (37 °C) 106 28 -- -- 116/75 90 mmHg 99 % -- -- -- -- -- --    03/02/25 1800 98.4 °F (36.9 °C) 110 35 115/86 96 120/80 94 mmHg 99 % -- -- -- -- -- --    03/02/25 17:29:36 -- -- -- -- -- -- -- -- -- -- -- -- -- 6 03/02/25 17:05:41 -- -- -- -- -- -- -- -- -- -- -- -- -- 6 03/02/25 17:04:55 -- -- -- -- -- -- -- 98 % 44 6 L/min Nasal cannula -- -- --    03/02/25 1600 97.9 °F (36.6 °C) 101 31 108/65 83 104/63 78 mmHg 97 % 36 4 L/min Nasal cannula -- 15 --    03/02/25 1500 -- 92 31 98/68 77 93/61 72 mmHg 98 % -- -- -- -- -- --    03/02/25 1445 -- 90 17 -- -- 87/60 70 mmHg 98 % -- -- -- -- -- --    03/02/25 1430 -- 90 37 -- -- 86/55 69 mmHg 98 % -- -- -- -- -- --    03/02/25 1415 -- 94 26 99/72 82 -- -- 99 % -- -- -- -- -- --    03/02/25 1414 -- -- -- -- -- -- -- -- -- -- -- -- -- 8    03/02/25 1400 98.6 °F (37 °C) 98 19 93/67 76 -- -- 99 % 28 2 L/min Nasal cannula Lying 15 --              Pertinent Labs/Diagnostic Test Results:   Radiology:  XR chest portable ICU    (Results Pending)     Cardiology:  No orders to display     GI:  No orders to display           Results from last 7 days   Lab Units 03/03/25  0500 03/02/25  0715   WBC Thousand/uL 9.73 13.19*   HEMOGLOBIN g/dL 15.6 15.9   HEMATOCRIT % 47.9 49.4*   PLATELETS Thousands/uL 257 256   TOTAL NEUT ABS Thousands/µL  --  8.63*         Results from last 7 days   Lab Units 03/03/25  0500 03/02/25  2344 03/02/25  1756 03/02/25  1406 03/02/25  0715   SODIUM mmol/L 140 138 140 140 141   POTASSIUM mmol/L 3.9 4.1 4.6 3.9 3.4*   CHLORIDE mmol/L 102 104 106 109* 106   CO2 mmol/L 25 21 21 21 25   ANION GAP mmol/L 13 13 13 10 10   BUN mg/dL 23 26* 27* 24 25   CREATININE mg/dL 1.69* 1.67* 1.65* 1.38* 1.65*   EGFR ml/min/1.73sq m 43 44 44 55 44   CALCIUM mg/dL 8.5 8.3* 8.3* 7.9* 9.2    CALCIUM, IONIZED mmol/L 1.03*  --   --   --   --    MAGNESIUM mg/dL 1.9 2.3  --  1.9  --    PHOSPHORUS mg/dL 5.3*  --   --  4.3  --      Results from last 7 days   Lab Units 03/03/25  0500 03/02/25  0715   AST U/L 626* 29   ALT U/L 100* 37   ALK PHOS U/L 50 47   TOTAL PROTEIN g/dL 6.8 6.7   ALBUMIN g/dL 3.8 4.0   TOTAL BILIRUBIN mg/dL 1.28* 1.33*   BILIRUBIN DIRECT mg/dL 0.25*  --          Results from last 7 days   Lab Units 03/03/25  0500 03/02/25  2344 03/02/25  1756 03/02/25  1406 03/02/25  0715   GLUCOSE RANDOM mg/dL 125 133 113 121 134         Results from last 7 days   Lab Units 03/03/25  0500   HEMOGLOBIN A1C % 5.7*   EAG mg/dl 117      Results from last 7 days   Lab Units 03/03/25  0501 03/02/25  1606   PH ART  7.417 7.346*   PCO2 ART mm Hg 36.1 30.1*   PO2 ART mm Hg 131.1* 123.8   HCO3 ART mmol/L 22.7 16.1*   BASE EXC ART mmol/L -1.2 -8.0   O2 CONTENT ART mL/dL 22.2 21.7   O2 HGB, ARTERIAL % 97.2* 93.7*   ABG SOURCE  Line, Arterial Line, Arterial     Results from last 7 days   Lab Units 03/03/25  0500 03/02/25  2344 03/02/25  1756   PH SIMONE  7.360 7.320 7.249*   PCO2 SIMONE mm Hg 47.5 46.3 47.0   PO2 SIMONE mm Hg 35.6 35.3 35.5   HCO3 SIMONE mmol/L 26.2 23.3* 20.1*   BASE EXC SIMONE mmol/L 0.1 -3.1 -7.2   O2 CONTENT SIMONE ml/dL 14.4 14.1 13.3   O2 HGB, VENOUS % 63.1 62.1 57.8*     Results from last 7 days   Lab Units 03/02/25  1112 03/02/25  0904 03/02/25  0715   HS TNI 0HR ng/L  --   --  66*   HS TNI 2HR ng/L  --  110*  --    HSTNI D2 ng/L  --  44*  --    HS TNI 4HR ng/L 441*  --   --    HSTNI D4 ng/L 375*  --   --          Results from last 7 days   Lab Units 03/03/25  0306 03/02/25  1918 03/02/25  1406 03/02/25  0947   PROTIME seconds  --   --   --  13.7   INR   --   --   --  0.98   PTT seconds 77* 44* 102* 27     Results from last 7 days   Lab Units 03/03/25  0500 03/02/25  1607 03/02/25  1406 03/02/25  1228   LACTIC ACID mmol/L 1.7 2.0 2.1* 2.4*     Results from last 7 days   Lab Units 03/02/25  1622   BNP pg/mL  705*     Results from last 7 days   Lab Units 03/02/25  0715   LIPASE u/L 32     Past Medical History:   Diagnosis Date    Heart attack (HCC)     Wegener's granulomatosis with renal involvement (HCC)      Present on Admission:   Cardiogenic shock (HCC)   Unstable angina (HCC)   Coronary artery disease involving native coronary artery of native heart with unstable angina pectoris (HCC)   Wegener's granulomatosis with renal involvement (HCC)   Acute kidney injury (HCC)   Severe mitral regurgitation   Moderate aortic insufficiency      Admitting Diagnosis: Chest pain [R07.9]  Age/Sex: 60 y.o. male    Network Utilization Review Department  ATTENTION: Please call with any questions or concerns to [phone] and carefully listen to the prompts so that you are directed to the right person. All voicemails are confidential.   For Discharge needs, contact Care Management DC Support Team at [phone] opt. 2  Send all requests for admission clinical reviews, approved or denied determinations and any other requests to dedicated fax number below belonging to the campus where the patient is receiving treatment. List of dedicated fax numbers for the Facilities:  FACILITY NAME UR FAX NUMBER   ADMISSION DENIALS (Administrative/Medical Necessity) [phone]   DISCHARGE SUPPORT TEAM (NETWORK) [phone]   PARENT CHILD HEALTH (Maternity/NICU/Pediatrics) [phone]   [facility] [phone]   [facility] [phone]   [facility] [phone]   [facility] [phone]   [facility] [phone]   [facility] [phone]   [facility] [phone]   [facility] [phone]   [facility] [phone]   [facility] [phone]   [facility]  [facility] [phone]   [facility] [phone]

## 2025-03-03 NOTE — CONSULTS
STEMI - History & Physical / Consultation  Rodney Pizarro 60 y.o. male MRN: [id number]  Unit/Bed#: PPHP-312-01 Encounter: [id number]      PCP: No primary care provider on file.  Outpatient Cardiologist: MADELAINE    History of Present Illness   HPI:  Rodney Pizarro is a 60 y.o. male with past medical history significant for CAD status post PCI to LAD/D1 in [place] in 2014, tobacco use disorder, acute kidney injury versus CKD, gout, dyslipidemia, Wegener's granulomatosis, moderate AI, severe mitral regurgitation, questionable low-flow low gradient severe AS who presented to hospital with complaints of chest pain.  EKG was nondiagnostic for STEMI.  Troponins trended upward, with peak troponin of 3013,  patient was subsequently found to be hemodynamically unstable on diagnosis of cardiogenic shock made.  Left heart catheterization showed multivessel CAD for which cardiac surgery consult was requested.  Interventional team could not place an intra-aortic balloon pump because of moderate AI.  Patient has been seen by cardiac surgery and does not to be very high risks for stated procedure on dialysis recommendation for high risks PCI suggested.  Based on above that patient is being reviewed by interventional cardiology for consideration for Impella assisted high risk PCI.  Echocardiogram during current admission shows severely reduced biventricular systolic function with an LVEF of 15 to 20% with a moderately reduced right ventricular systolic function.  Significant valvular diseases-severe mitral regurgitation, moderate aortic regurgitation, and questionable aortic stenosis also highlighted.  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  Patient remains hemodynamically unstable and currently on milrinone drip.  He is clinically fluid overloaded on chronic Bumex drip.  He denies any chest pain at time of encounter.  Labs show uprising AST/ALT.continues to report shortness of breath at rest and with with sustained conversations.  Lactate has  since normalized.      Assessment & Plan     Severe multivessel CAD  Acute decompensated systolic HFrEF, NYHA IV/ ACC/AHA stage D  NSTEMI  with cardiogenic shock,SCAI stage C,  on milrinone @0.25  Shock liver, uptrending AST/ALT  Tobacco use disorder  Severe MR, moderate AR, and ?LFLG AS     Plan:   Complete Impella assessment dedicated vascular studies per protocol  Load patient with Brilinta as cardiac surgery is no longer a consideration, continue with 90 mg twice daily.  Also continue aspirin 81 mg daily.  Agree with holding Lipitor at this time given abnormal liver function test, probably resume when feasible  Agree with milrinone for hemodynamic support, continue to hold beta-blockers suggest appropriate.  Agree with continued diuresis for further hemodynamic optimization with further input from heart failure.  GDMT for HFrEF HFrEF to be initiated and titrated as appropriate.      Our ultimate plan is to consider patient for high risks Impella supported PCI.  Benefit discussed with patient and wife at bedside.  Tentative plans for tomorrow 3/4/2025.  We will reassess patient's clinical status  for suitability tomorrow morning prior to proceeding with planned procedure.  Prudent to keep n.p.o. overnight in anticipation.        Historical Information   Past Medical History:   Diagnosis Date    Heart attack (HCC)     Wegener's granulomatosis with renal involvement (HCC)      No past surgical history on file.  Social History   Social History     Substance and Sexual Activity   Alcohol Use Never     Social History     Substance and Sexual Activity   Drug Use Never     Social History     Tobacco Use   Smoking Status Every Day    Current packs/day: 0.25    Types: Cigarettes   Smokeless Tobacco Never     Family History: No family history on file.    Meds/Allergies   all medications and allergies reviewed  No Known Allergies    Objective   Vitals: Blood pressure 98/69, pulse (!) 113, temperature (!) 100.6 °F (38.1 °C),  resp. rate (!) 30, weight 79.3 kg (174 lb 13.2 oz), SpO2 97%.      Intake/Output Summary (Last 24 hours) at 3/3/2025 1856  Last data filed at 3/3/2025 1800  Gross per 24 hour   Intake 677.28 ml   Output 6950 ml   Net -6272.72 ml       Invasive Devices       Central Venous Catheter Line  Duration             CVC Central Lines 03/02/25 Triple Right Internal jugular 1 day              Peripheral Intravenous Line  Duration             Peripheral IV 03/02/25 Left Antecubital 1 day    Peripheral IV 03/02/25 Right Antecubital 1 day              Drain  Duration             Urethral Catheter 1 day                    Review of Systems:  Review of Systems   Constitutional:  Negative for chills and fever.   HENT:  Negative for ear pain and sore throat.    Eyes:  Negative for pain and visual disturbance.   Respiratory:  Positive for shortness of breath. Negative for cough.    Cardiovascular:  Negative for chest pain and palpitations.   Gastrointestinal:  Negative for abdominal pain and vomiting.   Genitourinary:  Negative for dysuria and hematuria.   Musculoskeletal:  Negative for arthralgias and back pain.   Skin:  Negative for color change and rash.   Neurological:  Negative for seizures and syncope.   All other systems reviewed and are negative.      Physical Exam  Vitals:    03/03/25 1800   BP: 98/69   Pulse: (!) 113   Resp: (!) 30   Temp: (!) 100.6 °F (38.1 °C)   SpO2: 97%     GEN: Rodney Pizarro appears well, alert and oriented x 3, pleasant and cooperative lying in DD position.  HEENT:  Normocephalic, atraumatic, anicteric, moist mucous membranes  NECK: mildly increased JVD, No carotid bruits   HEART: Sinus rhythm, tachycardic, normal S1 and S2,  systolic and diastolic murmurs appreciated  LUNGS: Clear to auscultation bilaterally; no wheezes, rales, or rhonchi; respiration nonlabored   ABDOMEN:  Normoactive bowel sounds, soft, no tenderness, no distention  EXTREMITIES: peripheral pulses palpable; no edema  NEURO: no gross  focal findings; cranial nerves grossly intact   SKIN:  Dry, intact, warm to touch      Lab Results:  Results from last 7 days   Lab Units 03/02/25  1406 03/02/25  1112 03/02/25  0904 03/02/25  0715   HS TNI 0HR ng/L  --   --   --  66*   HS TNI 2HR ng/L  --   --  110*  --    HS TNI 4HR ng/L  --  441*  --   --    HS TNI RAND ng/L 3,013*  --   --   --          Results from last 7 days   Lab Units 03/03/25  1220 03/03/25  0500 03/02/25  2344   POTASSIUM mmol/L 4.1 3.9 4.1   CO2 mmol/L 32 25 21   CHLORIDE mmol/L 98 102 104   BUN mg/dL 24 23 26*   CREATININE mg/dL 1.80* 1.69* 1.67*     Results from last 7 days   Lab Units 03/03/25  0500 03/02/25  0715   HEMOGLOBIN g/dL 15.6 15.9   HEMATOCRIT % 47.9 49.4*   PLATELETS Thousands/uL 257 256     Results from last 7 days   Lab Units 03/03/25  0500   TRIGLYCERIDES mg/dL 100   HDL mg/dL 55   LDL CALC mg/dL 141*   HEMOGLOBIN A1C % 5.7*         Intake/Output Summary (Last 24 hours) at 3/3/2025 1845  Last data filed at 3/3/2025 1800  Gross per 24 hour   Intake 677.28 ml   Output 6950 ml   Net -6272.72 ml         Imaging:   Impella vascular studies pending   CT dissection protocol- No evidence of aortic dissection     Previous Cath/PCI:    3/2/25-   100 % proximal LAD occlusion at the site of previous stenting . There is also a 70% ostail LAD lesion.  Large L Cx artery with a 90 % ostial lesion.     100 % RCA occlusion proximally.     He is very very high risk for PCI of the L Cx ( his only open vessel ) .   He is very very high risk surgical candidate for MVR, AVR, CABG.     IABP was not placed due to moderate AI.    Results for orders placed during the hospital encounter of 03/02/25    Echo complete w/ contrast if indicated    Interpretation Summary    Left Ventricle: Left ventricular cavity size is dilated with LVEDD of 6.5 cm. 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 No apical thrombus visualized. The left ventricular ejection fraction is 15-20%. Systolic function is severely  reduced. There is severe global hypokinesis with apical aneurysmal dilatation and akinesis of the mid to apical anterior region. There is thinning and akinesia in the basal to mid inferior consistent with transmural myocardial infarction. Diastolic function is severely abnormal, consistent with Grade III restrictive diastolic dysfunction.    IVS: There is abnormal septal motion of unclear etiology.    Right Ventricle: Systolic function is moderately reduced.    Left Atrium: The atrium is severely dilated.    Aortic Valve: The aortic valve is trileaflet. The leaflets are severely thickened. The leaflets are severely calcified. There is severely reduced mobility. Low flow, low gradient aortic stenosis cannot be excluded. There is moderate regurgitation.    Mitral Valve: There is moderate annular calcification. There is moderate to severe regurgitation with an eccentrically anteriorly directed jet.    Aorta: The aortic root is mildly dilated. The ascending aorta is normal in size.      CMR: None at this time.     Sita Eisenberg MD  Interventional Cardiology Fellow, PGY-7

## 2025-03-03 NOTE — ASSESSMENT & PLAN NOTE
Palliative Diagnosis: Multivessel disease and ischemic cardiomyopathy 15 to 20%.  Due to extensive multivessel and questionable myocardial viability, would need a massive operation that is likely unsurvivable per cardiothoracic surgery.  Evaluated by CTS and future goal would be a workup for heart transplant.  Suspect he would need LVAD consideration as bridge therapy to heart transplant.    Goals:   Level 1- full code.  Remains treatment focused and wants to do what ever it takes to live longer.  States that he was told he would have 6 months or less to live when he was first diagnosed with Wegener's in 2008 and therefore is not fearful of death.  While he understands that he has a complicated medical history, he wants to be able to receive treatment if it will help lengthen his life.    Social Support:  Patient's support system: Spouse, has 2 adult children.  He lives at home with his spouse and adult children.  Has 2 grandchildren living at home as well. Extensive family support.  Supportive listening provided  Normalized experience of patient  Advocated for patient/family with interdisciplinary team    Care Coordination  Case discussed with primary team and advanced heart failure LCSW.    Follow-up  Will engage [facility] social work to assist in completing ACP documentation.  We appreciate the opportunity to participate in this patient's care.   We will continue to follow while admitted.    Palliative will follow for ongoing goals of care discussions as situation evolves.  Please do not hesitate to contact our on-call provider through [facility] Secure Chat or contact [phone] should there be an acute change or other symptom control concerns.

## 2025-03-03 NOTE — PROGRESS NOTES
Patient listed on Advanced Heart Failure Census at [facility]; CCU.    Outpatient Advanced Heart Failure LCSW completed electronic chart review to prepare for rounds with the HF Team. Dr. Moyer asked this LCSW to complete OP Psychosocial Assessment.    Met with patient and his wife was at bedside. Introduced self and reason for today's visit.    Patient alert and oriented, resting in hospital bed in CCU;  IV Milrinone.   Patient stated that he resides with his wife.   He used to work for a [employer] company in [place] until 2003. He was unable to apply for SSD because he's not a [place] citizen. Pt was born in [facility] and relocated [place] the [place] in [place]. Pt and spouse were sponsored to enter [place] by pt's daughter who was born in [place].   Pt has Green Card Application in review and an .     PATHS met with patient to help him apply for [place] MA. Wife stated that she will gather bank statements and documents needed prior to submitting the [place] MA application.    Pt was IND and drove PTA. He and spouse took care of his two grandchildren while their daughter works. Pt will most likely be approved for Emergent insurance coverage for ED and Inpatient Hospitalization.    Spouse stated that pt will be unable to get a transplant for 6 months because he was smoking until he was admitted to the hospital.     Patient does not have a current Advance Directive. Provided education about choosing a HCA and briefly reviewed choices about life sustaining measures.   Patient is receptive to completing an Advance Directive so this LCSW will deliver info to him today.     LCSW Intervention:  -Completed SOC Psychosocial Assessment and Care Plan.  -Provided education about Advance Directives and 'Take Me to [facility]' Freezer Packet. Also provided 'Decide for Yourself' booklet.  -Encouraged spouse to gather documents for [place] MA application.   -Made copy of patient's PA 's License to be scanned onto chart.   -Updated Inpt  ANNELIESE.  -Updated Dr. Moyer.

## 2025-03-03 NOTE — CHAPLAIN
attempted to visit pt due to his declining condition. No family was present to attend to. Pt is unable to converse.  will attempt to follow up with family. Spiritual care remains available.

## 2025-03-04 LAB
ALBUMIN SERPL BCG-MCNC: 3.7 G/DL (ref 3.5–5)
ALP SERPL-CCNC: 46 U/L (ref 34–104)
ALT SERPL W P-5'-P-CCNC: 145 U/L (ref 7–52)
ANION GAP SERPL CALCULATED.3IONS-SCNC: 10 MMOL/L (ref 4–13)
ANION GAP SERPL CALCULATED.3IONS-SCNC: 10 MMOL/L (ref 4–13)
ANION GAP SERPL CALCULATED.3IONS-SCNC: 7 MMOL/L (ref 4–13)
ANION GAP SERPL CALCULATED.3IONS-SCNC: 9 MMOL/L (ref 4–13)
APTT PPP: 67 SECONDS (ref 23–34)
AST SERPL W P-5'-P-CCNC: 876 U/L (ref 13–39)
ATRIAL RATE: 107 BPM
ATRIAL RATE: 119 BPM
ATRIAL RATE: 59 BPM
ATRIAL RATE: 99 BPM
ATRIAL RATE: 99 BPM
BASE EX.OXY STD BLDV CALC-SCNC: 57.2 % (ref 60–80)
BASE EX.OXY STD BLDV CALC-SCNC: 58.8 % (ref 60–80)
BASE EX.OXY STD BLDV CALC-SCNC: 64.1 % (ref 60–80)
BASE EX.OXY STD BLDV CALC-SCNC: 64.3 % (ref 60–80)
BASE EXCESS BLDV CALC-SCNC: 2.5 MMOL/L
BASE EXCESS BLDV CALC-SCNC: 5.3 MMOL/L
BASE EXCESS BLDV CALC-SCNC: 6 MMOL/L
BASE EXCESS BLDV CALC-SCNC: 6.9 MMOL/L
BILIRUB DIRECT SERPL-MCNC: 0.35 MG/DL (ref 0–0.2)
BILIRUB SERPL-MCNC: 2.43 MG/DL (ref 0.2–1)
BUN SERPL-MCNC: 24 MG/DL (ref 5–25)
BUN SERPL-MCNC: 24 MG/DL (ref 5–25)
BUN SERPL-MCNC: 31 MG/DL (ref 5–25)
BUN SERPL-MCNC: 35 MG/DL (ref 5–25)
CALCIUM SERPL-MCNC: 8.7 MG/DL (ref 8.4–10.2)
CALCIUM SERPL-MCNC: 8.8 MG/DL (ref 8.4–10.2)
CALCIUM SERPL-MCNC: 9.1 MG/DL (ref 8.4–10.2)
CALCIUM SERPL-MCNC: 9.2 MG/DL (ref 8.4–10.2)
CHLORIDE SERPL-SCNC: 93 MMOL/L (ref 96–108)
CHLORIDE SERPL-SCNC: 94 MMOL/L (ref 96–108)
CHLORIDE SERPL-SCNC: 96 MMOL/L (ref 96–108)
CHLORIDE SERPL-SCNC: 96 MMOL/L (ref 96–108)
CO2 SERPL-SCNC: 33 MMOL/L (ref 21–32)
CO2 SERPL-SCNC: 33 MMOL/L (ref 21–32)
CO2 SERPL-SCNC: 34 MMOL/L (ref 21–32)
CO2 SERPL-SCNC: 35 MMOL/L (ref 21–32)
CREAT SERPL-MCNC: 1.85 MG/DL (ref 0.6–1.3)
CREAT SERPL-MCNC: 1.91 MG/DL (ref 0.6–1.3)
CREAT SERPL-MCNC: 2.02 MG/DL (ref 0.6–1.3)
CREAT SERPL-MCNC: 2.08 MG/DL (ref 0.6–1.3)
ERYTHROCYTE [DISTWIDTH] IN BLOOD BY AUTOMATED COUNT: 13.7 % (ref 11.6–15.1)
GFR SERPL CREATININE-BSD FRML MDRD: 33 ML/MIN/1.73SQ M
GFR SERPL CREATININE-BSD FRML MDRD: 35 ML/MIN/1.73SQ M
GFR SERPL CREATININE-BSD FRML MDRD: 37 ML/MIN/1.73SQ M
GFR SERPL CREATININE-BSD FRML MDRD: 38 ML/MIN/1.73SQ M
GLUCOSE SERPL-MCNC: 109 MG/DL (ref 65–140)
GLUCOSE SERPL-MCNC: 110 MG/DL (ref 65–140)
GLUCOSE SERPL-MCNC: 142 MG/DL (ref 65–140)
GLUCOSE SERPL-MCNC: 99 MG/DL (ref 65–140)
HCO3 BLDV-SCNC: 27.2 MMOL/L (ref 24–30)
HCO3 BLDV-SCNC: 31.4 MMOL/L (ref 24–30)
HCO3 BLDV-SCNC: 31.6 MMOL/L (ref 24–30)
HCO3 BLDV-SCNC: 33.1 MMOL/L (ref 24–30)
HCT VFR BLD AUTO: 46.8 % (ref 36.5–49.3)
HGB BLD-MCNC: 15.3 G/DL (ref 12–17)
LACTATE SERPL-SCNC: 1.3 MMOL/L (ref 0.5–2)
MAGNESIUM SERPL-MCNC: 1.9 MG/DL (ref 1.9–2.7)
MAGNESIUM SERPL-MCNC: 1.9 MG/DL (ref 1.9–2.7)
MAGNESIUM SERPL-MCNC: 2.2 MG/DL (ref 1.9–2.7)
MAGNESIUM SERPL-MCNC: 2.3 MG/DL (ref 1.9–2.7)
MCH RBC QN AUTO: 29.9 PG (ref 26.8–34.3)
MCHC RBC AUTO-ENTMCNC: 32.7 G/DL (ref 31.4–37.4)
MCV RBC AUTO: 91 FL (ref 82–98)
NASAL CANNULA: 2
NASAL CANNULA: 2
NASAL CANNULA: 3
NASAL CANNULA: 6
O2 CT BLDV-SCNC: 13.3 ML/DL
O2 CT BLDV-SCNC: 13.7 ML/DL
O2 CT BLDV-SCNC: 14.3 ML/DL
O2 CT BLDV-SCNC: 14.5 ML/DL
P AXIS: 45 DEGREES
P AXIS: 48 DEGREES
P AXIS: 48 DEGREES
P AXIS: 56 DEGREES
PCO2 BLDV: 42.1 MM HG (ref 42–50)
PCO2 BLDV: 49 MM HG (ref 42–50)
PCO2 BLDV: 50.9 MM HG (ref 42–50)
PCO2 BLDV: 51.9 MM HG (ref 42–50)
PH BLDV: 7.41 [PH] (ref 7.3–7.4)
PH BLDV: 7.42 [PH] (ref 7.3–7.4)
PH BLDV: 7.43 [PH] (ref 7.3–7.4)
PH BLDV: 7.43 [PH] (ref 7.3–7.4)
PLATELET # BLD AUTO: 246 THOUSANDS/UL (ref 149–390)
PMV BLD AUTO: 10.7 FL (ref 8.9–12.7)
PO2 BLDV: 31.3 MM HG (ref 35–45)
PO2 BLDV: 32.8 MM HG (ref 35–45)
PO2 BLDV: 34.8 MM HG (ref 35–45)
PO2 BLDV: 35.7 MM HG (ref 35–45)
POTASSIUM SERPL-SCNC: 3.7 MMOL/L (ref 3.5–5.3)
POTASSIUM SERPL-SCNC: 3.8 MMOL/L (ref 3.5–5.3)
POTASSIUM SERPL-SCNC: 3.9 MMOL/L (ref 3.5–5.3)
POTASSIUM SERPL-SCNC: 4 MMOL/L (ref 3.5–5.3)
PR INTERVAL: 160 MS
PR INTERVAL: 164 MS
PR INTERVAL: 174 MS
PR INTERVAL: 182 MS
PROCALCITONIN SERPL-MCNC: 0.16 NG/ML
PROT SERPL-MCNC: 6.6 G/DL (ref 6.4–8.4)
QRS AXIS: -58 DEGREES
QRS AXIS: -60 DEGREES
QRS AXIS: -61 DEGREES
QRS AXIS: -62 DEGREES
QRS AXIS: -64 DEGREES
QRSD INTERVAL: 136 MS
QRSD INTERVAL: 138 MS
QRSD INTERVAL: 150 MS
QRSD INTERVAL: 154 MS
QRSD INTERVAL: 154 MS
QT INTERVAL: 388 MS
QT INTERVAL: 398 MS
QT INTERVAL: 402 MS
QT INTERVAL: 416 MS
QT INTERVAL: 454 MS
QTC INTERVAL: 510 MS
QTC INTERVAL: 517 MS
QTC INTERVAL: 533 MS
QTC INTERVAL: 565 MS
QTC INTERVAL: 638 MS
RBC # BLD AUTO: 5.12 MILLION/UL (ref 3.88–5.62)
SODIUM SERPL-SCNC: 135 MMOL/L (ref 135–147)
SODIUM SERPL-SCNC: 137 MMOL/L (ref 135–147)
SODIUM SERPL-SCNC: 139 MMOL/L (ref 135–147)
SODIUM SERPL-SCNC: 139 MMOL/L (ref 135–147)
T WAVE AXIS: 47 DEGREES
T WAVE AXIS: 56 DEGREES
T WAVE AXIS: 60 DEGREES
T WAVE AXIS: 61 DEGREES
T WAVE AXIS: 71 DEGREES
VENTRICULAR RATE: 107 BPM
VENTRICULAR RATE: 119 BPM
VENTRICULAR RATE: 119 BPM
VENTRICULAR RATE: 99 BPM
VENTRICULAR RATE: 99 BPM
WBC # BLD AUTO: 11.97 THOUSAND/UL (ref 4.31–10.16)

## 2025-03-04 PROCEDURE — 99232 SBSQ HOSP IP/OBS MODERATE 35: CPT | Performed by: INTERNAL MEDICINE

## 2025-03-04 PROCEDURE — 93010 ELECTROCARDIOGRAM REPORT: CPT | Performed by: STUDENT IN AN ORGANIZED HEALTH CARE EDUCATION/TRAINING PROGRAM

## 2025-03-04 PROCEDURE — 87040 BLOOD CULTURE FOR BACTERIA: CPT | Performed by: STUDENT IN AN ORGANIZED HEALTH CARE EDUCATION/TRAINING PROGRAM

## 2025-03-04 PROCEDURE — 80076 HEPATIC FUNCTION PANEL: CPT | Performed by: PHYSICIAN ASSISTANT

## 2025-03-04 PROCEDURE — 82805 BLOOD GASES W/O2 SATURATION: CPT | Performed by: PHYSICIAN ASSISTANT

## 2025-03-04 PROCEDURE — 80048 BASIC METABOLIC PNL TOTAL CA: CPT | Performed by: PHYSICIAN ASSISTANT

## 2025-03-04 PROCEDURE — 83605 ASSAY OF LACTIC ACID: CPT | Performed by: PHYSICIAN ASSISTANT

## 2025-03-04 PROCEDURE — 99291 CRITICAL CARE FIRST HOUR: CPT | Performed by: STUDENT IN AN ORGANIZED HEALTH CARE EDUCATION/TRAINING PROGRAM

## 2025-03-04 PROCEDURE — 85027 COMPLETE CBC AUTOMATED: CPT | Performed by: PHYSICIAN ASSISTANT

## 2025-03-04 PROCEDURE — 93010 ELECTROCARDIOGRAM REPORT: CPT | Performed by: INTERNAL MEDICINE

## 2025-03-04 PROCEDURE — 85730 THROMBOPLASTIN TIME PARTIAL: CPT | Performed by: STUDENT IN AN ORGANIZED HEALTH CARE EDUCATION/TRAINING PROGRAM

## 2025-03-04 PROCEDURE — 83735 ASSAY OF MAGNESIUM: CPT | Performed by: PHYSICIAN ASSISTANT

## 2025-03-04 PROCEDURE — 84145 PROCALCITONIN (PCT): CPT | Performed by: STUDENT IN AN ORGANIZED HEALTH CARE EDUCATION/TRAINING PROGRAM

## 2025-03-04 RX ORDER — BUMETANIDE 0.25 MG/ML
1 INJECTION, SOLUTION INTRAMUSCULAR; INTRAVENOUS ONCE
Status: COMPLETED | OUTPATIENT
Start: 2025-03-04 | End: 2025-03-04

## 2025-03-04 RX ORDER — AMIODARONE HYDROCHLORIDE 150 MG/3ML
INJECTION, SOLUTION INTRAVENOUS
Status: DISPENSED
Start: 2025-03-04 | End: 2025-03-05

## 2025-03-04 RX ORDER — MAGNESIUM SULFATE HEPTAHYDRATE 40 MG/ML
2 INJECTION, SOLUTION INTRAVENOUS ONCE
Status: COMPLETED | OUTPATIENT
Start: 2025-03-04 | End: 2025-03-04

## 2025-03-04 RX ORDER — ACETAMINOPHEN 325 MG/1
650 TABLET ORAL EVERY 6 HOURS PRN
Status: DISCONTINUED | OUTPATIENT
Start: 2025-03-04 | End: 2025-03-11 | Stop reason: HOSPADM

## 2025-03-04 RX ORDER — POTASSIUM CHLORIDE 14.9 MG/ML
20 INJECTION INTRAVENOUS ONCE
Status: COMPLETED | OUTPATIENT
Start: 2025-03-04 | End: 2025-03-05

## 2025-03-04 RX ORDER — OXYMETAZOLINE HYDROCHLORIDE 0.05 G/100ML
2 SPRAY NASAL EVERY 12 HOURS SCHEDULED
Status: DISPENSED | OUTPATIENT
Start: 2025-03-04 | End: 2025-03-06

## 2025-03-04 RX ORDER — POTASSIUM CHLORIDE 29.8 MG/ML
40 INJECTION INTRAVENOUS ONCE
Status: COMPLETED | OUTPATIENT
Start: 2025-03-04 | End: 2025-03-05

## 2025-03-04 RX ADMIN — MILRINONE LACTATE IN DEXTROSE 0.25 MCG/KG/MIN: 200 INJECTION, SOLUTION INTRAVENOUS at 00:22

## 2025-03-04 RX ADMIN — MILRINONE LACTATE IN DEXTROSE 0.25 MCG/KG/MIN: 200 INJECTION, SOLUTION INTRAVENOUS at 17:09

## 2025-03-04 RX ADMIN — ATORVASTATIN CALCIUM 80 MG: 80 TABLET, FILM COATED ORAL at 17:09

## 2025-03-04 RX ADMIN — HYDROMORPHONE HYDROCHLORIDE 0.2 MG: 0.2 INJECTION, SOLUTION INTRAMUSCULAR; INTRAVENOUS; SUBCUTANEOUS at 00:22

## 2025-03-04 RX ADMIN — BUMETANIDE 1 MG: 0.25 INJECTION INTRAMUSCULAR; INTRAVENOUS at 18:44

## 2025-03-04 RX ADMIN — AMIODARONE HYDROCHLORIDE 1 MG/MIN: 50 INJECTION, SOLUTION INTRAVENOUS at 13:16

## 2025-03-04 RX ADMIN — HYDROMORPHONE HYDROCHLORIDE 0.2 MG: 0.2 INJECTION, SOLUTION INTRAMUSCULAR; INTRAVENOUS; SUBCUTANEOUS at 17:09

## 2025-03-04 RX ADMIN — Medication 0.5 MG/HR: at 00:34

## 2025-03-04 RX ADMIN — ASPIRIN 81 MG: 81 TABLET, CHEWABLE ORAL at 08:34

## 2025-03-04 RX ADMIN — OXYMETAZOLINE HYDROCHLORIDE 2 SPRAY: 0.5 SPRAY NASAL at 12:46

## 2025-03-04 RX ADMIN — POTASSIUM CHLORIDE 20 MEQ: 14.9 INJECTION, SOLUTION INTRAVENOUS at 18:44

## 2025-03-04 RX ADMIN — AMIODARONE HYDROCHLORIDE 150 MG: 50 INJECTION, SOLUTION INTRAVENOUS at 12:41

## 2025-03-04 RX ADMIN — TICAGRELOR 90 MG: 90 TABLET ORAL at 08:34

## 2025-03-04 RX ADMIN — PREDNISONE 10 MG: 10 TABLET ORAL at 06:13

## 2025-03-04 RX ADMIN — CHLORHEXIDINE GLUCONATE 15 ML: 1.2 SOLUTION ORAL at 20:04

## 2025-03-04 RX ADMIN — TICAGRELOR 90 MG: 90 TABLET ORAL at 20:04

## 2025-03-04 RX ADMIN — CHLORHEXIDINE GLUCONATE 15 ML: 1.2 SOLUTION ORAL at 08:34

## 2025-03-04 RX ADMIN — AMIODARONE HYDROCHLORIDE 1 MG/MIN: 50 INJECTION, SOLUTION INTRAVENOUS at 19:14

## 2025-03-04 RX ADMIN — HYDROMORPHONE HYDROCHLORIDE 0.2 MG: 0.2 INJECTION, SOLUTION INTRAMUSCULAR; INTRAVENOUS; SUBCUTANEOUS at 20:09

## 2025-03-04 RX ADMIN — PREDNISONE 5 MG: 10 TABLET ORAL at 20:04

## 2025-03-04 RX ADMIN — MAGNESIUM SULFATE HEPTAHYDRATE 2 G: 40 INJECTION, SOLUTION INTRAVENOUS at 02:58

## 2025-03-04 RX ADMIN — POTASSIUM CHLORIDE 40 MEQ: 29.8 INJECTION, SOLUTION INTRAVENOUS at 02:58

## 2025-03-04 RX ADMIN — HYDROMORPHONE HYDROCHLORIDE 0.2 MG: 0.2 INJECTION, SOLUTION INTRAMUSCULAR; INTRAVENOUS; SUBCUTANEOUS at 08:34

## 2025-03-04 RX ADMIN — BUMETANIDE 1 MG: 0.25 INJECTION INTRAMUSCULAR; INTRAVENOUS at 13:16

## 2025-03-04 NOTE — CASE MANAGEMENT
Case Management Assessment & Discharge Planning Note    Patient name Rodney Pizarro  Location [facility]-01 MRN [id number]  : 1965 Date 3/4/2025       Current Admission Date: 3/2/2025  Current Admission Diagnosis:Cardiogenic shock (HCC)   Patient Active Problem List    Diagnosis Date Noted Date Diagnosed    Severe mitral regurgitation 2025     Moderate aortic insufficiency 2025     Palliative care encounter 2025     Unstable angina (HCC) 2025     Coronary artery disease involving native coronary artery of native heart with unstable angina pectoris (HCC) 2025     Cardiogenic shock (HCC) 2025     Wegener's granulomatosis with renal involvement (HCC) 2025     Acute kidney injury (HCC) 2025       LOS (days): 2  Geometric Mean LOS (GMLOS) (days): 4.1  Days to GMLOS:2     OBJECTIVE:  Risk of Unplanned Readmission Score: 8.53      Current admission status: Inpatient       Preferred Pharmacy:   [facility]/pharmacy #[id number] - [place] - [address]  [address]  [place] [id number]  Phone: [phone] Fax: [phone]    Primary Care Provider: No primary care provider on file.    Primary Insurance: [place] PENDING  Secondary Insurance:     ASSESSMENT:    Advance Directives  Does patient have Advance Directives?: No  Was patient offered paperwork?: Yes (HF LCSW delivered bedside pt spouse showed this CM)  Primary Contact: Spouse Evi [phone]    Patient Information  Admitted from:: Home  Mental Status: Other (Comment) (sleeping)  During Assessment patient was accompanied by: Spouse  Assessment information provided by:: Spouse, Other - please comment (chart)  Support Systems: Self, Spouse/significant other, Children  County of Residence: [place]  What city do you live in[email or web address]  Home entry access options. Select all that apply.: Stairs  Number of steps to enter home.: 5  Do the steps have railings?: Yes (both sides)  Type of Current  Residence: 2 story home  Upon entering residence, is there a bedroom on the main floor (no further steps)?: Yes  Upon entering residence, is there a bathroom on the main floor (no further steps)?: Yes  Living Arrangements: Lives w/ Extended Family (lives w/ spouse, daughter, son, 2 grandchildren)  Is patient a ?: No    Activities of Daily Living Prior to Admission  Functional Status: Independent  Completes ADLs independently?: Yes  Ambulates independently?: Yes  Does patient use assisted devices?: No  Does patient have a history of Outpatient Therapy (PT/OT)?: No  Does the patient have a history of Short-Term Rehab?: No  Does patient have a history of HHC?: No  Does patient currently have HHC?: No     Patient Information Continued  Income Source: Unemployed  Does patient have prescription coverage?: No  Does patient receive dialysis treatments?: No     Means of Transportation  Means of Transport to Appts:: Drives Self          DISCHARGE DETAILS:    Discharge planning discussed with:: Pt spouse bedside, pt asleep      Additional Comments: [facility] who gave handoff to this CM.  LCSW note dated 3/3/25 provides information for assesssment. CM met with pt & pt spouse bedside to introduce self & discuss. Spouse states PATHs paperwork was submitted today. Spouse has AD paperwork given to her by [facility]. Pt resides in 2 story home w/ 5 steps to enter w/ railing on both sides of stairs; IPTA, drives, no use of DME, was not working prior to admit. Lives w/ spouse, daughter, son, and 2 grandchildren. There is a full bedroom & bathroom on 1st floor of home.     What patient will qualify for regarding medical coverage is TBD. Pt is not citizen. Likely will receive coverage for emergent hospital stay. [facility] may service patients who are MA Pending, but the patient has to agree to being responsible for all homecare services costs if patient ends up not qualifying for Medicaid.     IF patient is approved  for financial assistance by the financial counselor, patient could potentially receive assistance with affording medication, IF approved by financial counselor AND IF there is a future concrete plan for how the medication will be funded afterwards. CM following & will be available.

## 2025-03-04 NOTE — PLAN OF CARE
Problem: CARDIOVASCULAR - ADULT  Goal: Absence of cardiac dysrhythmias or at baseline rhythm  Description: INTERVENTIONS:  - Continuous cardiac monitoring, vital signs, obtain 12 lead EKG if ordered  - Administer antiarrhythmic and heart rate control medications as ordered  - Monitor electrolytes and administer replacement therapy as ordered  Outcome: Progressing     Problem: Prexisting or High Potential for Compromised Skin Integrity  Goal: Skin integrity is maintained or improved  Description: INTERVENTIONS:  - Identify patients at risk for skin breakdown  - Assess and monitor skin integrity  - Assess and monitor nutrition and hydration status  - Monitor labs   - Assess for incontinence   - Turn and reposition patient  - Assist with mobility/ambulation  - Relieve pressure over bony prominences  - Avoid friction and shearing  - Provide appropriate hygiene as needed including keeping skin clean and dry  - Evaluate need for skin moisturizer/barrier cream  - Collaborate with interdisciplinary team   - Patient/family teaching  - Consider wound care consult   Outcome: Progressing     Problem: PAIN - ADULT  Goal: Verbalizes/displays adequate comfort level or baseline comfort level  Description: Interventions:  - Encourage patient to monitor pain and request assistance  - Assess pain using appropriate pain scale  - Administer analgesics based on type and severity of pain and evaluate response  - Implement non-pharmacological measures as appropriate and evaluate response  - Consider cultural and social influences on pain and pain management  - Notify physician/advanced practitioner if interventions unsuccessful or patient reports new pain  Outcome: Progressing     Problem: SAFETY ADULT  Goal: Patient will remain free of falls  Description: INTERVENTIONS:  - Educate patient/family on patient safety including physical limitations  - Instruct patient to call for assistance with activity   - Consult OT/PT to assist with  strengthening/mobility   - Keep Call bell within reach  - Keep bed low and locked with side rails adjusted as appropriate  - Keep care items and personal belongings within reach  - Initiate and maintain comfort rounds  - Make Fall Risk Sign visible to staff  - Offer Toileting every  Hours, in advance of need  - Initiate/Maintain alarm  - Obtain necessary fall risk management equipment:   - Apply yellow socks and bracelet for high fall risk patients  - Consider moving patient to room near nurses station  Outcome: Progressing  Goal: Maintain or return to baseline ADL function  Description: INTERVENTIONS:  -  Assess patient's ability to carry out ADLs; assess patient's baseline for ADL function and identify physical deficits which impact ability to perform ADLs (bathing, care of mouth/teeth, toileting, grooming, dressing, etc.)  - Assess/evaluate cause of self-care deficits   - Assess range of motion  - Assess patient's mobility; develop plan if impaired  - Assess patient's need for assistive devices and provide as appropriate  - Encourage maximum independence but intervene and supervise when necessary  - Involve family in performance of ADLs  - Assess for home care needs following discharge   - Consider OT consult to assist with ADL evaluation and planning for discharge  - Provide patient education as appropriate  Outcome: Progressing  Goal: Maintains/Returns to pre admission functional level  Description: INTERVENTIONS:  - Perform AM-PAC 6 Click Basic Mobility/ Daily Activity assessment daily.  - Set and communicate daily mobility goal to care team and patient/family/caregiver.   - Collaborate with rehabilitation services on mobility goals if consulted  - Perform Range of Motion  times a day.  - Reposition patient every  hours.  - Dangle patient  times a day  - Stand patient  times a day  - Ambulate patient  times a day  - Out of bed to chair  times a day   - Out of bed for meals  times a day  - Out of bed for  toileting  - Record patient progress and toleration of activity level   Outcome: Progressing

## 2025-03-04 NOTE — ASSESSMENT & PLAN NOTE
In the setting of ischemic cardiomyopathy and decompensated heart failure  SCAI stage A/B  Currently on milrinone 0.25  Warm and volume up  MAP of 70-75  Lactic acid normal today, LFTs elevated but stable  I/O: Out 4.75 L, net -3.25 L, creatinine improving  Overnight events: Patient had spiked a fever, and has been having multiple episodes of NSVT.    Plan:  Overall volume status is improving, and doing well on 0.25 of milrinone, continue  Given fever overnight, will hold off on Impella/PCI today  Continue with bolus dosing of Bumex, for net -2 to 2.5 L  Agree with amiodarone for increased ectopy

## 2025-03-04 NOTE — ASSESSMENT & PLAN NOTE
Has prior history of MI in 2011 and 2014 s/p PCI to LAD and RCA  Presented with chest pain diaphoresis and elevated troponin  EKG did not reveal ST elevations, did have inferior Q waves and right bundle branch block    Left heart cath revealed 100% proximal RCA lesion, 100% LAD lesion and 90% ostial left circumflex lesion.     Has been loaded with aspirin and Brilinta  Currently on heparin drip  Can hold statin in the setting of elevated LFTs  Planning for optimization of heart failure and shock, will with eventual plan of Impella assisted PCI of the left circumflex

## 2025-03-04 NOTE — PROGRESS NOTES
Progress Note - Electrophysiology   Name: Rodney Pizarro 60 y.o. male I MRN: [id number]  Unit/Bed#: [facility]-312-01 I Date of Admission: 3/2/2025   Date of Service: 3/4/2025 I Hospital Day: 2    Assessment & Plan  Cardiogenic shock (HCC)  In the setting of ischemic cardiomyopathy and decompensated heart failure  SCAI stage A/B  Currently on milrinone 0.25  Warm and volume up  MAP of 70-75  Lactic acid normal today, LFTs elevated but stable  I/O: Out 4.75 L, net -3.25 L, creatinine improving  Overnight events: Patient had spiked a fever, and has been having multiple episodes of NSVT.    Plan:  Overall volume status is improving, and doing well on 0.25 of milrinone, continue  Given fever overnight, will hold off on Impella/PCI today  Continue with bolus dosing of Bumex, for net -2 to 2.5 L  Agree with amiodarone for increased ectopy    Severe mitral regurgitation  Likely functional in the setting of dilated cardiomyopathy  Moderate aortic insufficiency  Also noted to have moderate AI and low-flow low gradient AS  Coronary artery disease involving native coronary artery of native heart with unstable angina pectoris (HCC)  Has prior history of MI in 2011 and 2014 s/p PCI to LAD and RCA  Presented with chest pain diaphoresis and elevated troponin  EKG did not reveal ST elevations, did have inferior Q waves and right bundle branch block    Left heart cath revealed 100% proximal RCA lesion, 100% LAD lesion and 90% ostial left circumflex lesion.     Has been loaded with aspirin and Brilinta  Currently on heparin drip  Can hold statin in the setting of elevated LFTs  Planning for optimization of heart failure and shock, will with eventual plan of Impella assisted PCI of the left circumflex    Unstable angina (HCC)    Wegener's granulomatosis with renal involvement (HCC)    Acute kidney injury (HCC)    Palliative care encounter      Subjective   Patient is tired but overall feeling better than the last 2 days.  Has been having  "increased ectopy on telemetry.  Did spike a fever overnight but no complaints concerning for URI or UTI.    Objective :  Temp:  [99.9 °F (37.7 °C)-100.8 °F (38.2 °C)] 99.9 °F (37.7 °C)  HR:  [107-120] 110  BP: ()/(54-76) 93/64  Resp:  [20-36] 21  SpO2:  [95 %-98 %] 96 %  O2 Device: Nasal cannula  Nasal Cannula O2 Flow Rate (L/min):  [2 L/min-6 L/min] 2 L/min    Physical Exam  Constitutional:       Appearance: Normal appearance. He is not ill-appearing.   Neck:      Vascular: Hepatojugular reflux present.   Cardiovascular:      Rate and Rhythm: Normal rate and regular rhythm.      Pulses: Normal pulses.   Musculoskeletal:      Right lower leg: No edema.      Left lower leg: No edema.   Neurological:      Mental Status: He is alert.            Lab Results: I have reviewed the following results:  Results from last 7 days   Lab Units 03/04/25  0202 03/03/25  0500 03/02/25  0715   WBC Thousand/uL 11.97* 9.73 13.19*   HEMOGLOBIN g/dL 15.3 15.6 15.9   HEMATOCRIT % 46.8 47.9 49.4*   PLATELETS Thousands/uL 246 257 256     Results from last 7 days   Lab Units 03/04/25  1135 03/04/25  0201 03/04/25  0008   POTASSIUM mmol/L 4.0 3.7 3.9   CHLORIDE mmol/L 94* 96 96   CO2 mmol/L 34* 33* 33*   BUN mg/dL 31* 24 24   CREATININE mg/dL 2.02* 1.85* 1.91*   CALCIUM mg/dL 9.2 8.8 9.1     Results from last 7 days   Lab Units 03/04/25  0528 03/03/25  0848 03/03/25  0306 03/02/25  1406 03/02/25  0947   INR   --   --   --   --  0.98   PTT seconds 67* 87* 77*   < > 27    < > = values in this interval not displayed.     Lab Results   Component Value Date    HGBA1C 5.7 (H) 03/03/2025     No results found for: \"CKTOTAL\", \"CKMB\", \"CKMBINDEX\", \"TROPONINI\"  Imaging Results Review: No pertinent imaging studies reviewed.  Other Study Results Review: No additional pertinent studies reviewed.    VTE Pharmacologic Prophylaxis: VTE covered by:  heparin (porcine), Intravenous, 12 Units/kg/hr at 03/04/25 1000  heparin (porcine), Intravenous  heparin " (porcine), Intravenous     VTE Mechanical Prophylaxis: sequential compression device

## 2025-03-04 NOTE — ASSESSMENT & PLAN NOTE
Ongoing chest pain 8/10 crushing/burning chest pain on arrival to [facility]  SBP prohibitive of initiating nitro gtt  Dilaudid PRN for pain control   Overall improved

## 2025-03-04 NOTE — ASSESSMENT & PLAN NOTE
Per family, no hx of CKD  Initial Cr 1.6 on arrival  Bumex bolus and gtt initiated  Gtt remains at 0.5  Strict I&O's  q6 BMP's  Avoid nephrotoxic agents

## 2025-03-04 NOTE — ASSESSMENT & PLAN NOTE
Also noted to have moderate AI and low-flow low gradient AS   [Time Spent: ___ minutes] : I have spent [unfilled] minutes of time on the encounter.

## 2025-03-04 NOTE — ASSESSMENT & PLAN NOTE
Presented to [facility] with chest pain, diaphoresis, nausea  EKG without obvious acute ischemic changes   STAT TTE from 3/2:  Left Ventricle: Left ventricular cavity size is dilated with LVEDD of 6.5 cm. 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 No apical thrombus visualized. The left ventricular ejection fraction is 15-20%. Systolic function is severely reduced. There is severe global hypokinesis with apical aneurysmal dilatation and akinesis of the mid to apical anterior region. There is thinning and akinesia in the basal to mid inferior consistent with transmural myocardial infarction. Diastolic function is severely abnormal, consistent with Grade III restrictive diastolic dysfunction.  IVS: There is abnormal septal motion of unclear etiology.  Right Ventricle: Systolic function is moderately reduced.  Left Atrium: The atrium is severely dilated.  Aortic Valve: The aortic valve is trileaflet. The leaflets are severely thickened. The leaflets are severely calcified. There is severely reduced mobility. Low flow, low gradient aortic stenosis cannot be excluded. There is moderate regurgitation.  Mitral Valve: There is moderate annular calcification. There is moderate to severe regurgitation with an eccentrically anteriorly directed jet.  Aorta: The aortic root is mildly dilated. The ascending aorta is normal in size.  Does have known hx of PCI to LAD 2014 but no known hx of heart failure but did used to take 80mg of IV lasix  Initial lactate 2.4, ScvO2 59, calculated Stefani CI of 1.5    Plan:  Discussed with cardiology and interventional cardiology  Taken to the cath lab where he was found to have LAD, LCx and RCA disease  No IABP placed due to moderate AI  Plan for Impella assisted PCI  Initially, on levophed for BP support but weaned to off once milrinone started  Milrinone 0.25  q6hr VBG and BMP/Mg  Diuresis with bumex gtt   Goal net netagive  Heart failure consultation   Strict I&O's

## 2025-03-04 NOTE — PROGRESS NOTES
Progress Note - Critical Care/ICU   Name: Rodney Pizarro 60 y.o. male I MRN: [id number]  Unit/Bed#: PPHP-312-01 I Date of Admission: 3/2/2025   Date of Service: 3/4/2025 I Hospital Day: 2      Assessment & Plan  Cardiogenic shock (HCC)  Presented to [facility] with chest pain, diaphoresis, nausea  EKG without obvious acute ischemic changes   STAT TTE from 3/2:  Left Ventricle: Left ventricular cavity size is dilated with LVEDD of 6.5 cm. 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 No apical thrombus visualized. The left ventricular ejection fraction is 15-20%. Systolic function is severely reduced. There is severe global hypokinesis with apical aneurysmal dilatation and akinesis of the mid to apical anterior region. There is thinning and akinesia in the basal to mid inferior consistent with transmural myocardial infarction. Diastolic function is severely abnormal, consistent with Grade III restrictive diastolic dysfunction.  IVS: There is abnormal septal motion of unclear etiology.  Right Ventricle: Systolic function is moderately reduced.  Left Atrium: The atrium is severely dilated.  Aortic Valve: The aortic valve is trileaflet. The leaflets are severely thickened. The leaflets are severely calcified. There is severely reduced mobility. Low flow, low gradient aortic stenosis cannot be excluded. There is moderate regurgitation.  Mitral Valve: There is moderate annular calcification. There is moderate to severe regurgitation with an eccentrically anteriorly directed jet.  Aorta: The aortic root is mildly dilated. The ascending aorta is normal in size.  Does have known hx of PCI to LAD 2014 but no known hx of heart failure but did used to take 80mg of IV lasix  Initial lactate 2.4, ScvO2 59, calculated Stefani CI of 1.5    Plan:  Discussed with cardiology and interventional cardiology  Taken to the cath lab where he was found to have LAD, LCx and RCA disease  No IABP placed due to moderate AI  Plan for Impella assisted  PCI  Initially, on levophed for BP support but weaned to off once milrinone started  Milrinone 0.25  q6hr VBG and BMP/Mg  Diuresis with bumex gtt   Goal net netagive  Heart failure consultation   Strict I&O's  Unstable angina (HCC)  Ongoing chest pain 8/10 crushing/burning chest pain on arrival to [facility]  SBP prohibitive of initiating nitro gtt  Dilaudid PRN for pain control   Overall improved  Coronary artery disease involving native coronary artery of native heart with unstable angina pectoris (HCC)  Previous hx of stents to LAD per wife - stopped taking medications 4-5 years ago  3/2 cardiac cath:   100 % proximal LAD occlusion at the site of previous stenting . There is also a 70% ostail LAD lesion.  Large L Cx artery with a 90 % ostial lesion.   100 % RCA occlusion proximally.  ASA. Statin. Heparin infusion.  CTS consult - no surgical intervention planned  Wegener's granulomatosis with renal involvement (HCC)  On daily prednisone per pt - continue as ordered  10mg Qam  5mg QHS  Acute kidney injury (HCC)  Per family, no hx of CKD  Initial Cr 1.6 on arrival  Bumex bolus and gtt initiated  Gtt remains at 0.5  Strict I&O's  q6 BMP's  Avoid nephrotoxic agents  Severe mitral regurgitation  Volume removal and medical management  Moderate aortic insufficiency  Volume removal and medical management  Palliative care encounter    Disposition: Critical care    ICU Core Measures     A: Assess, Prevent, and Manage Pain Has pain been assessed? Yes  Need for changes to pain regimen? No   B: Both SAT/SAT  N/A   C: Choice of Sedation RASS Goal: N/A patient not on sedation  Need for changes to sedation or analgesia regimen? NA   D: Delirium CAM-ICU: Negative   E: Early Mobility  Plan for early mobility? Yes   F: Family Engagement Plan for family engagement today? Yes       Review of Invasive Devices:    Covarrubias Plan: Continue for accurate I/O monitoring for 48 hours  Central access plan: Patient has multiple central venous catheters.   Hemodynamic monitoring      Prophylaxis:  VTE VTE covered by:  heparin (porcine), Intravenous, 12 Units/kg/hr at 03/03/25 2209  heparin (porcine), Intravenous  heparin (porcine), Intravenous       Stress Ulcer  not ordered         24 Hour Events : Bumex gtt held overnight, short run of VT. Low grade temp.   Subjective       Objective :                   Vitals I/O      Most Recent Min/Max in 24hrs   Temp 100.4 °F (38 °C) Temp  Min: 99.7 °F (37.6 °C)  Max: 100.8 °F (38.2 °C)   Pulse (!) 109 Pulse  Min: 107  Max: 120   Resp (!) 24 Resp  Min: 22  Max: 46   BP 90/59 BP  Min: 90/59  Max: 112/76   O2 Sat 95 % SpO2  Min: 95 %  Max: 99 %      Intake/Output Summary (Last 24 hours) at 3/4/2025 0537  Last data filed at 3/4/2025 0200  Gross per 24 hour   Intake 629.94 ml   Output 4000 ml   Net -3370.06 ml       Diet NPO    Invasive Monitoring           Physical Exam   Physical Exam  Vitals and nursing note reviewed.   Eyes:      Pupils: Pupils are equal, round, and reactive to light.   Skin:     General: Skin is warm and dry.   HENT:      Head: Normocephalic and atraumatic.   Neck:      Vascular: Central line present.   Cardiovascular:      Rate and Rhythm: Regular rhythm. Tachycardia present.      Heart sounds: Normal heart sounds.   Musculoskeletal:      Right lower leg: Edema present.      Left lower leg: Edema present.      Comments: R groin C/D/I   Abdominal: General: There is no distension.      Palpations: Abdomen is soft.      Tenderness: There is no abdominal tenderness.   Constitutional:       General: He is not in acute distress.     Appearance: He is not ill-appearing.   Pulmonary:      Effort: Pulmonary effort is normal.      Breath sounds: Normal breath sounds.   Psychiatric:         Behavior: Behavior is cooperative.   Neurological:      General: No focal deficit present.      Mental Status: He is alert and oriented to person, place, and time.          Diagnostic Studies        Lab Results: I have reviewed the  following results:     Medications:  Scheduled PRN   aspirin, 81 mg, Daily  atorvastatin, 80 mg, Daily With Dinner  chlorhexidine, 15 mL, Q12H ANDRESSA  potassium chloride, 40 mEq, Once  predniSONE, 10 mg, Early Morning  predniSONE, 5 mg, HS  senna-docusate sodium, 1 tablet, BID      acetaminophen, 650 mg, Q6H PRN  bisacodyl, 10 mg, Daily PRN  heparin (porcine), 2,000 Units, Q6H PRN  heparin (porcine), 4,000 Units, Q6H PRN  HYDROmorphone, 0.2 mg, Q2H PRN   Or  HYDROmorphone, 0.5 mg, Q2H PRN  ondansetron, 4 mg, Q6H PRN  trimethobenzamide, 200 mg, Q6H PRN       Continuous    heparin (porcine), 3-20 Units/kg/hr (Order-Specific), Last Rate: 12 Units/kg/hr (03/03/25 2209)  milrinone (Primacor) infusion, 0.25 mcg/kg/min, Last Rate: 0.25 mcg/kg/min (03/04/25 0022)  norepinephrine, 1-30 mcg/min, Last Rate: Stopped (03/02/25 1927)         Labs:   CBC    Recent Labs     03/03/25  0500 03/04/25  0202   WBC 9.73 11.97*   HGB 15.6 15.3   HCT 47.9 46.8    246     BMP    Recent Labs     03/04/25  0008 03/04/25  0201   SODIUM 139 139   K 3.9 3.7   CL 96 96   CO2 33* 33*   AGAP 10 10   BUN 24 24   CREATININE 1.91* 1.85*   CALCIUM 9.1 8.8       Coags    Recent Labs     03/02/25  0947 03/02/25  1406 03/03/25  0306 03/03/25  0848   INR 0.98  --   --   --    PTT 27   < > 77* 87*    < > = values in this interval not displayed.        Additional Electrolytes  Recent Labs     03/02/25  1406 03/02/25  2344 03/03/25  0500 03/03/25  1220 03/04/25  0008 03/04/25  0201   MG 1.9   < > 1.9   < > 1.9 1.9   PHOS 4.3  --  5.3*  --   --   --    CAIONIZED  --   --  1.03*  --   --   --     < > = values in this interval not displayed.          Blood Gas    Recent Labs     03/03/25  0501   PHART 7.417   TUJ2FRE 36.1   PO2ART 131.1*   ZJB8MNZ 22.7   BEART -1.2   SOURCE Line, Arterial     Recent Labs     03/03/25  0501 03/03/25  1220 03/04/25  0254   PHVEN  --    < > 7.422*   [id number]  --    < > 51.9*   PO2VEN  --    < > 34.8*   UYJ1ENV  --    < > 33.1*    BEVEN  --    < > 6.9   P1MUQHT  --    < > 64.3   SOURCE Line, Arterial  --   --     < > = values in this interval not displayed.    LFTs  Recent Labs     03/03/25  1828 03/04/25  0202   * 145*   * 876*   ALKPHOS 47 46   ALB 3.7 3.7   TBILI 1.59* 2.43*       Infectious  No recent results  Glucose  Recent Labs     03/03/25  1220 03/03/25  1828 03/04/25  0008 03/04/25  0201   GLUC 131 131 110 109

## 2025-03-04 NOTE — ASSESSMENT & PLAN NOTE
Previous hx of stents to LAD per wife - stopped taking medications 4-5 years ago  3/2 cardiac cath:   100 % proximal LAD occlusion at the site of previous stenting . There is also a 70% ostail LAD lesion.  Large L Cx artery with a 90 % ostial lesion.   100 % RCA occlusion proximally.  ASA. Statin. Heparin infusion.  CTS consult - no surgical intervention planned

## 2025-03-05 ENCOUNTER — PATIENT OUTREACH (OUTPATIENT)
Dept: CARDIOLOGY CLINIC | Facility: CLINIC | Age: 60
End: 2025-03-05

## 2025-03-05 LAB
ALBUMIN SERPL BCG-MCNC: 3.4 G/DL (ref 3.5–5)
ALP SERPL-CCNC: 47 U/L (ref 34–104)
ALT SERPL W P-5'-P-CCNC: 100 U/L (ref 7–52)
ANION GAP SERPL CALCULATED.3IONS-SCNC: 10 MMOL/L (ref 4–13)
ANION GAP SERPL CALCULATED.3IONS-SCNC: 10 MMOL/L (ref 4–13)
ANION GAP SERPL CALCULATED.3IONS-SCNC: 7 MMOL/L (ref 4–13)
APTT PPP: 65 SECONDS (ref 23–34)
ARTERIAL PATENCY WRIST A: NO
AST SERPL W P-5'-P-CCNC: 372 U/L (ref 13–39)
ATRIAL RATE: 84 BPM
BASE EX.OXY STD BLDV CALC-SCNC: 37.9 % (ref 60–80)
BASE EX.OXY STD BLDV CALC-SCNC: 43.3 % (ref 60–80)
BASE EX.OXY STD BLDV CALC-SCNC: 63.4 % (ref 60–80)
BASE EX.OXY STD BLDV CALC-SCNC: 70.8 % (ref 60–80)
BASE EXCESS BLDV CALC-SCNC: 2.8 MMOL/L
BASE EXCESS BLDV CALC-SCNC: 4.5 MMOL/L
BASE EXCESS BLDV CALC-SCNC: 5.3 MMOL/L
BASE EXCESS BLDV CALC-SCNC: 5.3 MMOL/L
BILIRUB DIRECT SERPL-MCNC: 0.38 MG/DL (ref 0–0.2)
BILIRUB SERPL-MCNC: 2.48 MG/DL (ref 0.2–1)
BODY TEMPERATURE: 99.8 DEGREES FEHRENHEIT
BUN SERPL-MCNC: 37 MG/DL (ref 5–25)
BUN SERPL-MCNC: 37 MG/DL (ref 5–25)
BUN SERPL-MCNC: 40 MG/DL (ref 5–25)
CALCIUM SERPL-MCNC: 8.5 MG/DL (ref 8.4–10.2)
CALCIUM SERPL-MCNC: 8.6 MG/DL (ref 8.4–10.2)
CALCIUM SERPL-MCNC: 8.6 MG/DL (ref 8.4–10.2)
CHLORIDE SERPL-SCNC: 91 MMOL/L (ref 96–108)
CHLORIDE SERPL-SCNC: 92 MMOL/L (ref 96–108)
CHLORIDE SERPL-SCNC: 93 MMOL/L (ref 96–108)
CO2 SERPL-SCNC: 33 MMOL/L (ref 21–32)
CO2 SERPL-SCNC: 33 MMOL/L (ref 21–32)
CO2 SERPL-SCNC: 36 MMOL/L (ref 21–32)
CREAT SERPL-MCNC: 1.91 MG/DL (ref 0.6–1.3)
CREAT SERPL-MCNC: 1.92 MG/DL (ref 0.6–1.3)
CREAT SERPL-MCNC: 2.11 MG/DL (ref 0.6–1.3)
ERYTHROCYTE [DISTWIDTH] IN BLOOD BY AUTOMATED COUNT: 13.4 % (ref 11.6–15.1)
GFR SERPL CREATININE-BSD FRML MDRD: 33 ML/MIN/1.73SQ M
GFR SERPL CREATININE-BSD FRML MDRD: 37 ML/MIN/1.73SQ M
GFR SERPL CREATININE-BSD FRML MDRD: 37 ML/MIN/1.73SQ M
GLUCOSE SERPL-MCNC: 109 MG/DL (ref 65–140)
GLUCOSE SERPL-MCNC: 118 MG/DL (ref 65–140)
GLUCOSE SERPL-MCNC: 122 MG/DL (ref 65–140)
HCO3 BLDV-SCNC: 29 MMOL/L (ref 24–30)
HCO3 BLDV-SCNC: 29.8 MMOL/L (ref 24–30)
HCO3 BLDV-SCNC: 30.7 MMOL/L (ref 24–30)
HCO3 BLDV-SCNC: 31.1 MMOL/L (ref 24–30)
HCT VFR BLD AUTO: 45 % (ref 36.5–49.3)
HGB BLD-MCNC: 14.7 G/DL (ref 12–17)
MAGNESIUM SERPL-MCNC: 2.1 MG/DL (ref 1.9–2.7)
MAGNESIUM SERPL-MCNC: 2.2 MG/DL (ref 1.9–2.7)
MCH RBC QN AUTO: 29.6 PG (ref 26.8–34.3)
MCHC RBC AUTO-ENTMCNC: 32.7 G/DL (ref 31.4–37.4)
MCV RBC AUTO: 91 FL (ref 82–98)
NASAL CANNULA: 0
NASAL CANNULA: 2
NASAL CANNULA: 2
O2 CT BLDV-SCNC: 13.9 ML/DL
O2 CT BLDV-SCNC: 15.7 ML/DL
O2 CT BLDV-SCNC: 8.3 ML/DL
O2 CT BLDV-SCNC: 9.5 ML/DL
P AXIS: 47 DEGREES
PCO2 BLD: 51.9 MM HG (ref 42–50)
PCO2 BLDV: 46.2 MM HG (ref 42–50)
PCO2 BLDV: 47.2 MM HG (ref 42–50)
PCO2 BLDV: 49.6 MM HG (ref 42–50)
PCO2 BLDV: 50.3 MM HG (ref 42–50)
PH BLD: 7.37 [PH] (ref 7.3–7.4)
PH BLDV: 7.38 [PH] (ref 7.3–7.4)
PH BLDV: 7.42 [PH] (ref 7.3–7.4)
PH BLDV: 7.43 [PH] (ref 7.3–7.4)
PH BLDV: 7.43 [PH] (ref 7.3–7.4)
PLATELET # BLD AUTO: 239 THOUSANDS/UL (ref 149–390)
PMV BLD AUTO: 10.6 FL (ref 8.9–12.7)
PO2 BLDV: 24.9 MM HG (ref 35–45)
PO2 BLDV: 26.1 MM HG (ref 35–45)
PO2 BLDV: 35 MM HG (ref 35–45)
PO2 BLDV: 39.3 MM HG (ref 35–45)
PO2 VENOUS TEMP CORRECTED: 26.2 MM HG (ref 35–45)
POTASSIUM SERPL-SCNC: 3.9 MMOL/L (ref 3.5–5.3)
POTASSIUM SERPL-SCNC: 3.9 MMOL/L (ref 3.5–5.3)
POTASSIUM SERPL-SCNC: 4 MMOL/L (ref 3.5–5.3)
PR INTERVAL: 212 MS
PROT SERPL-MCNC: 6.5 G/DL (ref 6.4–8.4)
QRS AXIS: -56 DEGREES
QRSD INTERVAL: 136 MS
QT INTERVAL: 468 MS
QTC INTERVAL: 553 MS
RBC # BLD AUTO: 4.97 MILLION/UL (ref 3.88–5.62)
SODIUM SERPL-SCNC: 134 MMOL/L (ref 135–147)
SODIUM SERPL-SCNC: 135 MMOL/L (ref 135–147)
SODIUM SERPL-SCNC: 136 MMOL/L (ref 135–147)
T WAVE AXIS: 47 DEGREES
VENTRICULAR RATE: 84 BPM
WBC # BLD AUTO: 11.68 THOUSAND/UL (ref 4.31–10.16)

## 2025-03-05 PROCEDURE — 82805 BLOOD GASES W/O2 SATURATION: CPT | Performed by: PHYSICIAN ASSISTANT

## 2025-03-05 PROCEDURE — 99232 SBSQ HOSP IP/OBS MODERATE 35: CPT | Performed by: INTERNAL MEDICINE

## 2025-03-05 PROCEDURE — 93010 ELECTROCARDIOGRAM REPORT: CPT | Performed by: INTERNAL MEDICINE

## 2025-03-05 PROCEDURE — 99233 SBSQ HOSP IP/OBS HIGH 50: CPT | Performed by: STUDENT IN AN ORGANIZED HEALTH CARE EDUCATION/TRAINING PROGRAM

## 2025-03-05 PROCEDURE — 97163 PT EVAL HIGH COMPLEX 45 MIN: CPT

## 2025-03-05 PROCEDURE — 85027 COMPLETE CBC AUTOMATED: CPT | Performed by: PHYSICIAN ASSISTANT

## 2025-03-05 PROCEDURE — 97167 OT EVAL HIGH COMPLEX 60 MIN: CPT

## 2025-03-05 PROCEDURE — 80048 BASIC METABOLIC PNL TOTAL CA: CPT | Performed by: PHYSICIAN ASSISTANT

## 2025-03-05 PROCEDURE — 83735 ASSAY OF MAGNESIUM: CPT | Performed by: PHYSICIAN ASSISTANT

## 2025-03-05 PROCEDURE — 85730 THROMBOPLASTIN TIME PARTIAL: CPT | Performed by: STUDENT IN AN ORGANIZED HEALTH CARE EDUCATION/TRAINING PROGRAM

## 2025-03-05 PROCEDURE — 80076 HEPATIC FUNCTION PANEL: CPT | Performed by: PHYSICIAN ASSISTANT

## 2025-03-05 RX ORDER — BUMETANIDE 0.25 MG/ML
2 INJECTION, SOLUTION INTRAMUSCULAR; INTRAVENOUS 3 TIMES DAILY
Status: DISCONTINUED | OUTPATIENT
Start: 2025-03-05 | End: 2025-03-10

## 2025-03-05 RX ORDER — BUMETANIDE 0.25 MG/ML
2 INJECTION, SOLUTION INTRAMUSCULAR; INTRAVENOUS 2 TIMES DAILY
Status: DISCONTINUED | OUTPATIENT
Start: 2025-03-05 | End: 2025-03-05

## 2025-03-05 RX ORDER — BUMETANIDE 0.25 MG/ML
1 INJECTION, SOLUTION INTRAMUSCULAR; INTRAVENOUS ONCE
Status: COMPLETED | OUTPATIENT
Start: 2025-03-05 | End: 2025-03-05

## 2025-03-05 RX ORDER — BUMETANIDE 0.25 MG/ML
1 INJECTION, SOLUTION INTRAMUSCULAR; INTRAVENOUS 2 TIMES DAILY
Status: DISCONTINUED | OUTPATIENT
Start: 2025-03-05 | End: 2025-03-05

## 2025-03-05 RX ADMIN — HYDROMORPHONE HYDROCHLORIDE 0.2 MG: 0.2 INJECTION, SOLUTION INTRAMUSCULAR; INTRAVENOUS; SUBCUTANEOUS at 16:43

## 2025-03-05 RX ADMIN — TICAGRELOR 90 MG: 90 TABLET ORAL at 08:07

## 2025-03-05 RX ADMIN — BUMETANIDE 1 MG: 0.25 INJECTION INTRAMUSCULAR; INTRAVENOUS at 09:29

## 2025-03-05 RX ADMIN — OXYMETAZOLINE HYDROCHLORIDE 2 SPRAY: 0.5 SPRAY NASAL at 08:08

## 2025-03-05 RX ADMIN — OXYMETAZOLINE HYDROCHLORIDE 2 SPRAY: 0.5 SPRAY NASAL at 20:29

## 2025-03-05 RX ADMIN — ASPIRIN 81 MG: 81 TABLET, CHEWABLE ORAL at 08:07

## 2025-03-05 RX ADMIN — CHLORHEXIDINE GLUCONATE 15 ML: 1.2 SOLUTION ORAL at 08:07

## 2025-03-05 RX ADMIN — BUMETANIDE 2 MG: 0.25 INJECTION INTRAMUSCULAR; INTRAVENOUS at 16:11

## 2025-03-05 RX ADMIN — ATORVASTATIN CALCIUM 80 MG: 80 TABLET, FILM COATED ORAL at 16:11

## 2025-03-05 RX ADMIN — AMIODARONE HYDROCHLORIDE 0.5 MG/MIN: 50 INJECTION, SOLUTION INTRAVENOUS at 11:57

## 2025-03-05 RX ADMIN — BUMETANIDE 1 MG: 0.25 INJECTION INTRAMUSCULAR; INTRAVENOUS at 08:44

## 2025-03-05 RX ADMIN — CHLORHEXIDINE GLUCONATE 15 ML: 1.2 SOLUTION ORAL at 20:29

## 2025-03-05 RX ADMIN — SENNOSIDES AND DOCUSATE SODIUM 1 TABLET: 50; 8.6 TABLET ORAL at 17:37

## 2025-03-05 RX ADMIN — HYDROMORPHONE HYDROCHLORIDE 0.2 MG: 0.2 INJECTION, SOLUTION INTRAMUSCULAR; INTRAVENOUS; SUBCUTANEOUS at 20:29

## 2025-03-05 RX ADMIN — HEPARIN SODIUM 12 UNITS/KG/HR: 10000 INJECTION, SOLUTION INTRAVENOUS at 01:52

## 2025-03-05 RX ADMIN — MILRINONE LACTATE IN DEXTROSE 0.25 MCG/KG/MIN: 200 INJECTION, SOLUTION INTRAVENOUS at 08:11

## 2025-03-05 RX ADMIN — PREDNISONE 5 MG: 10 TABLET ORAL at 20:28

## 2025-03-05 RX ADMIN — PREDNISONE 10 MG: 10 TABLET ORAL at 05:52

## 2025-03-05 RX ADMIN — HYDROMORPHONE HYDROCHLORIDE 0.2 MG: 0.2 INJECTION, SOLUTION INTRAMUSCULAR; INTRAVENOUS; SUBCUTANEOUS at 10:22

## 2025-03-05 RX ADMIN — BUMETANIDE 2 MG: 0.25 INJECTION INTRAMUSCULAR; INTRAVENOUS at 20:28

## 2025-03-05 RX ADMIN — SENNOSIDES AND DOCUSATE SODIUM 1 TABLET: 50; 8.6 TABLET ORAL at 08:07

## 2025-03-05 RX ADMIN — TICAGRELOR 90 MG: 90 TABLET ORAL at 20:29

## 2025-03-05 NOTE — PROGRESS NOTES
Progress Note - Heart Failure   Name: Rodney Pizarro 60 y.o. male I MRN: [id number]  Unit/Bed#: PPHP-312-01 I Date of Admission: 3/2/2025   Date of Service: 3/5/2025 I Hospital Day: 3    Assessment & Plan  Cardiogenic shock (HCC)  In the setting of ischemic cardiomyopathy and decompensated heart failure  SCAI stage A/B  Currently on milrinone 0.25, SVO 2 of 63.4, hemoglobin of 14.7  Cardiac output of 3.4 L/min-cardiac index of 1.8   Warm and euvolemic  MAP of 70-75  LFTs improving, kidney function stable  I/O: Out 1.46 L, net +400 mL, creatinine improving  Given patient was having multiple runs of NSVT, was started on amiodarone yesterday  No further fevers    Plan:  Overall volume status is improving, and doing well on 0.25 of milrinone, continue  Planning for Impella assisted PCI on Friday  Continue Bumex 2 mg twice daily, aim for net -500 cc  Agree with amiodarone for increased ectopy    Severe mitral regurgitation  Likely functional in the setting of dilated cardiomyopathy  Moderate aortic insufficiency  Also noted to have moderate AI and low-flow low gradient AS  Coronary artery disease involving native coronary artery of native heart with unstable angina pectoris (HCC)  Has prior history of MI in 2011 and 2014 s/p PCI to LAD and RCA  Presented with chest pain diaphoresis and elevated troponin  EKG did not reveal ST elevations, did have inferior Q waves and right bundle branch block    Left heart cath revealed 100% proximal RCA lesion, 100% LAD lesion and 90% ostial left circumflex lesion.     Has been loaded with aspirin and Brilinta  Currently on heparin drip  Planning for optimization of heart failure and shock, will with eventual plan of Impella assisted PCI of the left circumflex    Unstable angina (HCC)    Wegener's granulomatosis with renal involvement (HCC)    Acute kidney injury (HCC)    Palliative care encounter      24 Hour Events : No significant overnight events  Subjective : Overall  improved    Objective :  Temp:  [99.1 °F (37.3 °C)-100.4 °F (38 °C)] 99.3 °F (37.4 °C)  HR:  [] 106  BP: ()/(53-73) 90/57  Resp:  [14-29] 25  SpO2:  [94 %-99 %] 97 %  O2 Device: Nasal cannula  Nasal Cannula O2 Flow Rate (L/min):  [2 L/min] 2 L/min    Physical Exam  Constitutional:       Appearance: Normal appearance. He is not ill-appearing.   Cardiovascular:      Rate and Rhythm: Normal rate and regular rhythm.      Heart sounds: No murmur heard.  Musculoskeletal:      Right lower leg: No edema.      Left lower leg: No edema.   Skin:     General: Skin is warm.   Neurological:      Mental Status: He is alert.         Lab Results: I have reviewed the following results:CBC/BMP:   .     03/05/25  0403 03/05/25  0404   WBC  --  11.68*   HGB  --  14.7   HCT  --  45.0   PLT  --  239   SODIUM 136  --    K 3.9  --    CL 93*  --    CO2 33*  --    BUN 37*  --    CREATININE 1.91*  --    GLUC 118  --    MG 2.2  --     , Creatinine Clearance: Estimated Creatinine Clearance: 38.9 mL/min (A) (by C-G formula based on SCr of 1.91 mg/dL (H)).    Imaging Results Review: No pertinent imaging studies reviewed.  Other Study Results Review: No additional pertinent studies reviewed.    VTE Pharmacologic Prophylaxis: VTE covered by:  heparin (porcine), Intravenous, 12 Units/kg/hr at 03/05/25 0152  heparin (porcine), Intravenous  heparin (porcine), Intravenous     VTE Mechanical Prophylaxis: sequential compression device

## 2025-03-05 NOTE — ASSESSMENT & PLAN NOTE
Palliative Diagnosis: Multivessel disease and ischemic cardiomyopathy 15 to 20%.  Due to extensive multivessel and questionable myocardial viability, would need a massive operation that is likely unsurvivable per cardiothoracic surgery.  Evaluated by CTS and Heart Failure team.   Currently, he is not a transplant nor LVAD candidate.     Goals:   Level 1- full code.  Remains treatment focused and wants to do what ever it takes to live longer.  States that he was told he would have 6 months or less to live when he was first diagnosed with Wegener's in 2008 and therefore is not fearful of death.  While he understands that he has a complicated medical history, he wants to be able to receive treatment if it will help lengthen his life.    Social Support:  Patient's support system: Spouse, has 2 adult children.  He lives at home with his spouse and adult children.  Has 2 grandchildren living at home as well. Extensive family support.  Supportive listening provided  Normalized experience of patient  Advocated for patient/family with interdisciplinary team    Care Coordination  Case discussed with primary team and advanced heart failure LCSW.    Follow-up  Will engage [facility] social work to assist in completing ACP documentation.  Per HF, plan is to attempt to stabilize on milrinone and DC w/ GDMT if unable to wean and follow-up w/ HF upon discharge.  Given that goals are clear as patient remains treatment focused, Palliative Care will sign-off. Please do not hesitate to contact our on-call provider through EP[facility] Secure Chat or contact [phone] should there be an acute change or other symptom control concerns.

## 2025-03-05 NOTE — PLAN OF CARE
Problem: OCCUPATIONAL THERAPY ADULT  Goal: Performs self-care activities at highest level of function for planned discharge setting.  See evaluation for individualized goals.  Description: Treatment Interventions: ADL retraining, Functional transfer training, Endurance training, Continued evaluation, Energy conservation          See flowsheet documentation for full assessment, interventions and recommendations.   Outcome: Progressing  Note: Limitation: Decreased ADL status, Decreased Safe judgement during ADL, Decreased endurance, Decreased self-care trans, Decreased high-level ADLs  Prognosis: Good  Assessment: Pt is a 60 y.o. male who was admitted to [facility] on 3/2/2025 with Cardiogenic shock (HCC), with plan for Impella assisted PCI on Friday. Pt seen for an OT evaluation per active OT orders.  Pt  has a past medical history of Heart attack (HCC) and Wegener's granulomatosis with renal involvement (HCC). Pt lives in a [facility] with [facility], able to use a FFSU with walk-in shower with seat/GB and standard toilet with GB. Pta, pt was independent w/ ADL/IADL and functional mobility, was (+) driving and was not using any DME at baseline. Currently, pt is Supervision for UB ADL, Min Ax1 for LB ADL, and completed transfers/FM w Min Ax1 with RW. Pt currently presents with impairments in the following categories -steps to enter environment, difficulty performing ADLS, and limited insight into deficits activity tolerance and endurance. These impairments, as well as pt's fatigue, SOB, CARRILLO, pain, and risk for falls  limit pt's ability to safely engage in all baseline areas of occupation, includinggrooming, bathing, dressing, toileting, and functional mobility/transfers.  The patient's raw score on the AM-PAC Daily Activity Inpatient Short Form is 19. A raw score of greater than or equal to 19 suggests the patient may benefit from discharge to home. Please refer to the recommendation of the Occupational Therapist  for safe discharge planning. Pt would benefit from continued acute OT services throughout hospital course and following D/C. Plan for OT interventions 2-3x per week. From OT standpoint, recommend home with social support, no further OT needs pending progress upon D/C. Pt was left seated in bedside chair with chair alarm on and all needs within reach.     Rehab Resource Intensity Level, OT: No post-acute rehabilitation needs (pending progress)

## 2025-03-05 NOTE — PROGRESS NOTES
Progress Note - Palliative Care   Name: Rodney Pizarro 60 y.o. male I MRN: [id number]  Unit/Bed#: PPHP-312-01 I Date of Admission: 3/2/2025   Date of Service: 3/5/2025 I Hospital Day: 3    Assessment & Plan  Palliative care encounter  Palliative Diagnosis: Multivessel disease and ischemic cardiomyopathy 15 to 20%.  Due to extensive multivessel and questionable myocardial viability, would need a massive operation that is likely unsurvivable per cardiothoracic surgery.  Evaluated by CTS and Heart Failure team.   Currently, he is not a transplant nor LVAD candidate.     Goals:   Level 1- full code.  Remains treatment focused and wants to do what ever it takes to live longer.  States that he was told he would have 6 months or less to live when he was first diagnosed with Wegener's in 2008 and therefore is not fearful of death.  While he understands that he has a complicated medical history, he wants to be able to receive treatment if it will help lengthen his life.    Social Support:  Patient's support system: Spouse, has 2 adult children.  He lives at home with his spouse and adult children.  Has 2 grandchildren living at home as well. Extensive family support.  Supportive listening provided  Normalized experience of patient  Advocated for patient/family with interdisciplinary team    Care Coordination  Case discussed with primary team and advanced heart failure LCSW.    Follow-up  Will engage [facility] social work to assist in completing ACP documentation.  Per HF, plan is to attempt to stabilize on milrinone and DC w/ GDMT if unable to wean and follow-up w/ HF upon discharge.  Given that goals are clear as patient remains treatment focused, Palliative Care will sign-off. Please do not hesitate to contact our on-call provider through EPIC Secure Chat or contact [phone] should there be an acute change or other symptom control concerns.    Cardiogenic shock (HCC)  Admitted to ICU.  Coronary artery disease involving  native coronary artery of native heart with unstable angina pectoris (HCC)    Wegener's granulomatosis with renal involvement (HCC)    Severe mitral regurgitation    Moderate aortic insufficiency      Advance Directive / Living Will / POLST: None on file, PSC SW assisting in obtaining ACP documentation     PDMP Review: I have reviewed the patient's controlled substance dispensing history in the Prescription Drug Monitoring Program in compliance with the [employer] regulations before prescribing any controlled substances.    Subjective   Seen laying in recliner with wife present at bedside.  We reviewed the healthcare representative documentation and patient identified his wife as his MDM.  He states that he is feeling okay but is apprehensive that he will completely get better.  He wants to    Objective :  Temp:  [99.1 °F (37.3 °C)-100.4 °F (38 °C)] 99.1 °F (37.3 °C)  HR:  [] 100  BP: ()/(53-73) 90/63  Resp:  [14-32] 22  SpO2:  [94 %-99 %] 98 %  O2 Device: Nasal cannula  Nasal Cannula O2 Flow Rate (L/min):  [1 L/min-2 L/min] 1 L/min    Physical Exam  Vitals reviewed.   Constitutional:       General: He is not in acute distress.     Appearance: Normal appearance. He is not ill-appearing.   HENT:      Head: Normocephalic.      Mouth/Throat:      Pharynx: Oropharynx is clear.   Eyes:      Conjunctiva/sclera: Conjunctivae normal.   Cardiovascular:      Rate and Rhythm: Regular rhythm. Tachycardia present.      Pulses: Normal pulses.      Heart sounds: Normal heart sounds.   Pulmonary:      Effort: Pulmonary effort is normal. No respiratory distress.   Abdominal:      General: Bowel sounds are normal.      Palpations: Abdomen is soft.      Tenderness: There is no abdominal tenderness.   Musculoskeletal:      Right lower leg: No edema.      Left lower leg: No edema.   Skin:     General: Skin is warm and dry.   Neurological:      Mental Status: He is alert and oriented to person, place, and time.   Psychiatric:         " Mood and Affect: Mood normal.         Behavior: Behavior normal.         Thought Content: Thought content normal.         Judgment: Judgment normal.          Lab Results: I have reviewed the following results:  Lab Results   Component Value Date/Time    SODIUM 136 03/05/2025 04:03 AM    K 3.9 03/05/2025 04:03 AM    BUN 37 (H) 03/05/2025 04:03 AM    CREATININE 1.91 (H) 03/05/2025 04:03 AM    GLUC 118 03/05/2025 04:03 AM    CALCIUM 8.6 03/05/2025 04:03 AM     (H) 03/05/2025 04:03 AM     (H) 03/05/2025 04:03 AM    ALB 3.4 (L) 03/05/2025 04:03 AM    TP 6.5 03/05/2025 04:03 AM    EGFR 37 03/05/2025 04:03 AM     Lab Results   Component Value Date/Time    HGB 14.7 03/05/2025 04:04 AM    WBC 11.68 (H) 03/05/2025 04:04 AM     03/05/2025 04:04 AM    INR 0.98 03/02/2025 09:47 AM    PTT 65 (H) 03/05/2025 03:59 AM     No results found for: \"[id number]\"    Code Status: Level 1 - Full Code  Advance Directive and Living Will:      Power of :    POLST:      Administrative Statements   I have spent a total time of 25 minutes in caring for this patient on the day of the visit/encounter including Patient and family education, Counseling / Coordination of care, Documenting in the medical record, Reviewing/placing orders in the medical record (including tests, medications, and/or procedures), Obtaining or reviewing history  , and Communicating with other healthcare professionals .   "

## 2025-03-05 NOTE — PLAN OF CARE
Problem: PHYSICAL THERAPY ADULT  Goal: Performs mobility at highest level of function for planned discharge setting.  See evaluation for individualized goals.  Description: Treatment/Interventions: Functional transfer training, LE strengthening/ROM, Elevations, Therapeutic exercise, Endurance training, Equipment eval/education, Bed mobility, Gait training, Spoke to nursing, Spoke to case management, OT          See flowsheet documentation for full assessment, interventions and recommendations.  Note: Prognosis: Good  Problem List: Decreased strength, Decreased endurance, Impaired balance, Decreased mobility  Assessment: Pt is 60 y.o. male admitted with hx of chest, SOB, diaphoresis, and nausea/emesis and Dx of NSTEMI, Acute decompensated systolic HFrEF, Cardiogenic shock, severe MR, and shock liver; pt underwent cardiac cath which revealed CAD; PCI w/ impella is being considered. Pt 's comorbidities affecting POC include: Heart attack, CAD, Wegener's granulomatosis with renal involvement, gout and tobacco use and personal factors of: HAKAN. Pt's clinical presentation is currently unstable/unpredictable which is evident in ongoing telemetry monitoring while in a critical care unit, supplemental O2 needs, and additional w/u and procedure pending. Pt presents w/ (R) knee discomfort and guarding, generalized weakness, incl decreased LE strength, decreased functional endurance and activity tolerance, and impaired balance w/ associated gait deviations requiring use of rw at this time. Will cont to follow pt in PT for progressive mobilization to address above functional deficits and to max level of (I), endurance, and safety. Otherwise, anticipate pt will return home w/ available family support upon D/C provided he cont improving w/ mobility skills, safety, and endurance (incl on the steps) and when medically cleared; D/C recommendations are outlined below; will follow.        Rehab Resource Intensity Level, PT: No  post-acute rehabilitation needs    See flowsheet documentation for full assessment.

## 2025-03-05 NOTE — ASSESSMENT & PLAN NOTE
Has prior history of MI in 2011 and 2014 s/p PCI to LAD and RCA  Presented with chest pain diaphoresis and elevated troponin  EKG did not reveal ST elevations, did have inferior Q waves and right bundle branch block    Left heart cath revealed 100% proximal RCA lesion, 100% LAD lesion and 90% ostial left circumflex lesion.     Has been loaded with aspirin and Brilinta  Currently on heparin drip  Planning for optimization of heart failure and shock, will with eventual plan of Impella assisted PCI of the left circumflex

## 2025-03-05 NOTE — PHYSICAL THERAPY NOTE
Physical Therapy Evaluation     Patient's Name: Rodney Pizarro    Admitting Diagnosis  Chest pain [R07.9]    Problem List  Patient Active Problem List   Diagnosis    Unstable angina (HCC)    Coronary artery disease involving native coronary artery of native heart with unstable angina pectoris (HCC)    Cardiogenic shock (HCC)    Wegener's granulomatosis with renal involvement (HCC)    Acute kidney injury (HCC)    Severe mitral regurgitation    Moderate aortic insufficiency    Palliative care encounter       Past Medical History  Past Medical History:   Diagnosis Date    Heart attack (HCC)     Wegener's granulomatosis with renal involvement (HCC)        Past Surgical History  Past Surgical History:   Procedure Laterality Date    CARDIAC CATHETERIZATION N/A 3/2/2025    Procedure: Cardiac Coronary Angiogram;  Surgeon: Garry Elizabeth MD;  Location: [facility];  Service: Cardiology    CARDIAC CATHETERIZATION  3/2/2025    Procedure: Heart Catherization;  Surgeon: Garry Elizabeth MD;  Location: [facility];  Service: Cardiology        03/05/25 1031   PT Last Visit   PT Visit Date 03/05/25   Note Type   Note type Evaluation   Pain Assessment   Pain Assessment Tool FLACC   Pain Location/Orientation Orientation: Right;Location: Knee   Pain Onset/Description Onset: Ongoing;Frequency: Intermittent;Descriptor: Aching;Descriptor: Discomfort;Descriptor: Sore   Effect of Pain on Daily Activities Initial pain w/ mvt --> improved gradually w/ no increased discomfort during amb   Patient's Stated Pain Goal No pain   Hospital Pain Intervention(s) Repositioned;Ambulation/increased activity;Emotional support  (medicated by nsg)   Pain Rating: FLACC (Rest) - Face 0   Pain Rating: FLACC (Rest) - Legs 0   Pain Rating: FLACC (Rest) - Activity 0   Pain Rating: FLACC (Rest) - Cry 0   Pain Rating: FLACC (Rest) - Consolability 0   Score: FLACC (Rest) 0   Pain Rating: FLACC (Activity) - Face 1   Pain Rating: FLACC (Activity)  - Legs 0   Pain Rating: FLACC (Activity) - Activity 0   Pain Rating: FLACC (Activity) - Cry 1   Pain Rating: FLACC (Activity) - Consolability 1   Score: FLACC (Activity) 3   Restrictions/Precautions   Braces or Orthoses   (denies)   Other Precautions Cardiac/sternal;Chair Alarm;Multiple lines;Telemetry;O2;Pain   Home Living   Type of Home House   Home Layout Two level  (5 HAKAN w/ hand rail)   Prior Function   Level of Dickey Independent with functional mobility  (amb w/o AD)   Lives With Spouse;Family   General   Additional Pertinent History cleared for assessment by nsg   Family/Caregiver Present Yes   Cognition   Overall Cognitive Status WFL   Arousal/Participation Alert   Attention Within functional limits   Orientation Level Oriented to person;Oriented to place;Oriented to situation   Memory Within functional limits   Following Commands Follows one step commands without difficulty   Subjective   Subjective Alert; in the chair; wishes to amb   RUE Assessment   RUE Assessment WFL  (AROM)   LUE Assessment   LUE Assessment WFL  (AROM)   RLE Assessment   RLE Assessment WFL  (AROM)   Strength RLE   RLE Overall Strength   (fair/ fair +)   LLE Assessment   LLE Assessment WFL  (AROM)   Strength LLE   LLE Overall Strength   (good -/ good)   Transfers   Sit to Stand 4  Minimal assistance   Additional items Assist x 1;Verbal cues   Stand to Sit 4  Minimal assistance   Additional items Assist x 1;Verbal cues   Ambulation/Elevation   Gait pattern Short stride;Inconsistent eldon   Gait Assistance 4  Minimal assist   Additional items Assist x 1;Verbal cues;Tactile cues   Assistive Device Rolling walker   Distance 100 ft   Balance   Static Sitting Fair   Dynamic Sitting Fair -   Static Standing Fair -   Dynamic Standing Poor +   Ambulatory Poor +   Activity Tolerance   Activity Tolerance Patient tolerated treatment well  (no increased (R) knee pain)   Medical Staff Made Aware Co-eval performed w/ OTR due to complexity of  medical status and multiple comorbidities   Nurse Made Aware spoke to LISA Easton   Assessment   Prognosis Good   Problem List Decreased strength;Decreased endurance;Impaired balance;Decreased mobility   Assessment Pt is 60 y.o. male admitted with hx of chest, SOB, diaphoresis, and nausea/emesis and Dx of NSTEMI, Acute decompensated systolic HFrEF, Cardiogenic shock, severe MR, and shock liver; pt underwent cardiac cath which revealed CAD; PCI w/ impella is being considered. Pt 's comorbidities affecting POC include: Heart attack, CAD, Wegener's granulomatosis with renal involvement, gout and tobacco use and personal factors of: HAKAN. Pt's clinical presentation is currently unstable/unpredictable which is evident in ongoing telemetry monitoring while in a critical care unit, supplemental O2 needs, and additional w/u and procedure pending. Pt presents w/ (R) knee discomfort and guarding, generalized weakness, incl decreased LE strength, decreased functional endurance and activity tolerance, and impaired balance w/ associated gait deviations requiring use of rw at this time. Will cont to follow pt in PT for progressive mobilization to address above functional deficits and to max level of (I), endurance, and safety. Otherwise, anticipate pt will return home w/ available family support upon D/C provided he cont improving w/ mobility skills, safety, and endurance (incl on the steps) and when medically cleared; D/C recommendations are outlined below; will follow.   Goals   Patient Goals to have a procedure on Fri and feel better   STG Expiration Date 03/15/25   Short Term Goal #1 7-10 days. Pt will amb 300 ft w/ least restrictive assistive device PRN, mod (I) in order to facilitate safe return to premorbid environment and community amb status. Pt will negotiate 5 steps w/ hand rail, (S)x1 in order to navigate in and out of home environment safely. Pt will achieve mod (I) level w/ bed mob in order to facilitate safety with  OOB and back to bed transitions in own living environment. Pt will perform transfers w/ mod (I) to assure (I) and safety w/ functional mobility/transitions w/ all aspects of mobility/locomotion.  Pt will participate in LE therex and balance activities to max progression w/ mobility skills.   PT Treatment Day 0   Plan   Treatment/Interventions Functional transfer training;LE strengthening/ROM;Elevations;Therapeutic exercise;Endurance training;Equipment eval/education;Bed mobility;Gait training;Spoke to nursing;Spoke to case management;OT   PT Frequency 3-5x/wk   Discharge Recommendation   Rehab Resource Intensity Level, PT No post-acute rehabilitation needs   AM-PAC Basic Mobility Inpatient   Turning in Flat Bed Without Bedrails 3   Lying on Back to Sitting on Edge of Flat Bed Without Bedrails 3   Moving Bed to Chair 3   Standing Up From Chair Using Arms 3   Walk in Room 3   Climb 3-5 Stairs With Railing 2   Basic Mobility Inpatient Raw Score 17   Basic Mobility Standardized Score 39.67   [facility] Highest Level Of Mobility   -HLM Goal 5: Stand one or more mins   -HLM Achieved 7: Walk 25 feet or more   Modified Gabriela Scale   Modified Gabriela Scale 4   End of Consult   Patient Position at End of Consult Bedside chair;Bed/Chair alarm activated;All needs within reach           Donaldo Pate, PT

## 2025-03-05 NOTE — PROGRESS NOTES
Progress Note - Critical Care/ICU   Name: Rodney Pizarro 60 y.o. male I MRN: [id number]  Unit/Bed#: PPHP-312-01 I Date of Admission: 3/2/2025   Date of Service: 3/5/2025 I Hospital Day: 3      Assessment & Plan  Cardiogenic shock (HCC)  Presented to [facility] with chest pain, diaphoresis, nausea  EKG without obvious acute ischemic changes   STAT TTE from 3/2:  Left Ventricle: Left ventricular cavity size is dilated with LVEDD of 6.5 cm. 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 No apical thrombus visualized. The left ventricular ejection fraction is 15-20%. Systolic function is severely reduced. There is severe global hypokinesis with apical aneurysmal dilatation and akinesis of the mid to apical anterior region. There is thinning and akinesia in the basal to mid inferior consistent with transmural myocardial infarction. Diastolic function is severely abnormal, consistent with Grade III restrictive diastolic dysfunction.  IVS: There is abnormal septal motion of unclear etiology.  Right Ventricle: Systolic function is moderately reduced.  Left Atrium: The atrium is severely dilated.  Aortic Valve: The aortic valve is trileaflet. The leaflets are severely thickened. The leaflets are severely calcified. There is severely reduced mobility. Low flow, low gradient aortic stenosis cannot be excluded. There is moderate regurgitation.  Mitral Valve: There is moderate annular calcification. There is moderate to severe regurgitation with an eccentrically anteriorly directed jet.  Aorta: The aortic root is mildly dilated. The ascending aorta is normal in size.  Does have known hx of PCI to LAD 2014 but no known hx of heart failure but did used to take 80mg of IV lasix  Initial lactate 2.4, ScvO2 59, calculated Stefani CI of 1.5    Plan:  Discussed with cardiology and interventional cardiology  Taken to the cath lab where he was found to have LAD, LCx and RCA disease  No IABP placed due to moderate AI  Plan for Impella assisted PCI likely  3/7  Initially, on levophed for BP support but weaned to off once milrinone started  Milrinone 0.25  q6hr VBG and BMP/Mg  Diuresis with bumex prn  Heart failure consultation   Strict I&O's  Unstable angina (HCC)  Ongoing chest pain 8/10 crushing/burning chest pain on arrival to [facility]  SBP prohibitive of initiating nitro gtt  Dilaudid PRN for pain control   Overall improved  Coronary artery disease involving native coronary artery of native heart with unstable angina pectoris (HCC)  Previous hx of stents to LAD per wife - stopped taking medications 4-5 years ago  3/2 cardiac cath:   100 % proximal LAD occlusion at the site of previous stenting . There is also a 70% ostail LAD lesion.  Large L Cx artery with a 90 % ostial lesion.   100 % RCA occlusion proximally.  Plan for Impella assisted PCI likely 3/7  ASA. Statin. Heparin infusion.  CTS consult - no surgical intervention planned  Wegener's granulomatosis with renal involvement (HCC)  On daily prednisone per pt - continue as ordered  10mg Qam  5mg QHS  Acute kidney injury (HCC)  Per family, no hx of CKD  Initial Cr 1.6 on arrival  Received bumex 1mg x 2 yesterday  Goal even to slightly negative fluid balance  Strict I&O's  q6 BMP's  Avoid nephrotoxic agents  Severe mitral regurgitation  Volume removal and medical management  Moderate aortic insufficiency  Volume removal and medical management  Palliative care encounter    Disposition: Critical care    ICU Core Measures     A: Assess, Prevent, and Manage Pain Has pain been assessed? Yes  Need for changes to pain regimen? No   B: Both SAT/SAT  N/A   C: Choice of Sedation RASS Goal: N/A patient not on sedation  Need for changes to sedation or analgesia regimen? NA   D: Delirium CAM-ICU: Negative   E: Early Mobility  Plan for early mobility? Yes   F: Family Engagement Plan for family engagement today? Yes       Review of Invasive Devices:    Covarrubias Plan: Continue for accurate I/O monitoring for 48 hours  Central access  plan: Patient has multiple central venous catheters.  Medications requiring central line      Prophylaxis:  VTE VTE covered by:  heparin (porcine), Intravenous, 12 Units/kg/hr at 03/05/25 0152  heparin (porcine), Intravenous  heparin (porcine), Intravenous       Stress Ulcer  not ordered         24 Hour Events : Frequent VT yesterday, started on amio gtt. Received bumex 1mg IV x 2.   Subjective       Objective :                   Vitals I/O      Most Recent Min/Max in 24hrs   Temp 99.1 °F (37.3 °C) Temp  Min: 99.1 °F (37.3 °C)  Max: 100.4 °F (38 °C)   Pulse 102 Pulse  Min: 99  Max: 116   Resp (!) 25 Resp  Min: 14  Max: 29   BP 90/57 BP  Min: 84/54  Max: 101/63   O2 Sat 97 % SpO2  Min: 94 %  Max: 98 %      Intake/Output Summary (Last 24 hours) at 3/5/2025 0501  Last data filed at 3/5/2025 0400  Gross per 24 hour   Intake 1579.11 ml   Output 1775 ml   Net -195.89 ml       Diet Cardiovascular; Cardiac    Invasive Monitoring           Physical Exam   Physical Exam  Vitals and nursing note reviewed.   Eyes:      Pupils: Pupils are equal, round, and reactive to light.   Skin:     General: Skin is warm and dry.   HENT:      Head: Normocephalic and atraumatic.   Neck:      Vascular: Central line present.   Cardiovascular:      Rate and Rhythm: Regular rhythm. Tachycardia present.      Heart sounds: Normal heart sounds.   Musculoskeletal:      Right lower leg: Edema present.      Left lower leg: Edema present.   Abdominal: General: There is no distension.      Palpations: Abdomen is soft.      Tenderness: There is no abdominal tenderness.   Pulmonary:      Effort: Pulmonary effort is normal.      Breath sounds: Normal breath sounds.   Psychiatric:         Behavior: Behavior is cooperative.   Neurological:      General: No focal deficit present.      Mental Status: He is alert and oriented to person, place, and time.          Diagnostic Studies        Lab Results: I have reviewed the following results:      Medications:  Scheduled PRN   aspirin, 81 mg, Daily  atorvastatin, 80 mg, Daily With Dinner  chlorhexidine, 15 mL, Q12H ANDRESSA  oxymetazoline, 2 spray, Q12H ANDRESSA  predniSONE, 10 mg, Early Morning  predniSONE, 5 mg, HS  senna-docusate sodium, 1 tablet, BID  ticagrelor, 90 mg, Q12H ANDRESSA      acetaminophen, 650 mg, Q6H PRN  bisacodyl, 10 mg, Daily PRN  heparin (porcine), 2,000 Units, Q6H PRN  heparin (porcine), 4,000 Units, Q6H PRN  HYDROmorphone, 0.2 mg, Q2H PRN   Or  HYDROmorphone, 0.5 mg, Q2H PRN  ondansetron, 4 mg, Q6H PRN  trimethobenzamide, 200 mg, Q6H PRN       Continuous    amiodarone (CORDARONE) 900 mg in dextrose 5 % 500 mL infusion, 0.5 mg/min, Last Rate: 0.5 mg/min (03/04/25 1915)  heparin (porcine), 3-20 Units/kg/hr (Order-Specific), Last Rate: 12 Units/kg/hr (03/05/25 0152)  milrinone (Primacor) infusion, 0.25 mcg/kg/min, Last Rate: 0.25 mcg/kg/min (03/04/25 1800)  norepinephrine, 1-30 mcg/min, Last Rate: Stopped (03/02/25 1927)         Labs:   CBC    Recent Labs     03/04/25  0202 03/05/25  0404   WBC 11.97* 11.68*   HGB 15.3 14.7   HCT 46.8 45.0    239     BMP    Recent Labs     03/04/25  1757 03/04/25  2349   SODIUM 135 135   K 3.8 3.9   CL 93* 92*   CO2 35* 33*   AGAP 7 10   BUN 35* 37*   CREATININE 2.08* 1.92*   CALCIUM 8.7 8.6       Coags    Recent Labs     03/04/25  0528 03/05/25  0359   PTT 67* 65*        Additional Electrolytes  Recent Labs     03/04/25  1757 03/04/25  2349   MG 2.2 2.1          Blood Gas    No recent results    Recent Labs     03/05/25  0403   PHVEN 7.431*   [id number] 47.2   PO2VEN 35.0   AXW4PII 30.7*   BEVEN 5.3   [id number] 63.4    LFTs  Recent Labs     03/03/25  1828 03/04/25  0202   * 145*   * 876*   ALKPHOS 47 46   ALB 3.7 3.7   TBILI 1.59* 2.43*       Infectious  Recent Labs     03/04/25  1047   PROCALCITONI 0.16     Glucose  Recent Labs     03/04/25  0201 03/04/25  1135 03/04/25  1757 03/04/25  2349   GLUC 109 99 142* 109

## 2025-03-05 NOTE — PROGRESS NOTES
Left voice mssg for PATHS to follow-up on progress of [employer] [facility] MA application.

## 2025-03-05 NOTE — ASSESSMENT & PLAN NOTE
Previous hx of stents to LAD per wife - stopped taking medications 4-5 years ago  3/2 cardiac cath:   100 % proximal LAD occlusion at the site of previous stenting . There is also a 70% ostail LAD lesion.  Large L Cx artery with a 90 % ostial lesion.   100 % RCA occlusion proximally.  Plan for Impella assisted PCI likely 3/7  ASA. Statin. Heparin infusion.  CTS consult - no surgical intervention planned

## 2025-03-05 NOTE — OCCUPATIONAL THERAPY NOTE
Occupational Therapy Evaluation     Patient Name: Rodney Pizarro  Today's Date: 3/5/2025  Problem List  Principal Problem:    Cardiogenic shock (HCC)  Active Problems:    Unstable angina (HCC)    Coronary artery disease involving native coronary artery of native heart with unstable angina pectoris (HCC)    Wegener's granulomatosis with renal involvement (HCC)    Acute kidney injury (HCC)    Severe mitral regurgitation    Moderate aortic insufficiency    Palliative care encounter    Past Medical History  Past Medical History:   Diagnosis Date    Heart attack (HCC)     Wegener's granulomatosis with renal involvement (HCC)      Past Surgical History  Past Surgical History:   Procedure Laterality Date    CARDIAC CATHETERIZATION N/A 3/2/2025    Procedure: Cardiac Coronary Angiogram;  Surgeon: Garry Elizabeth MD;  Location: [facility];  Service: Cardiology    CARDIAC CATHETERIZATION  3/2/2025    Procedure: Heart Catherization;  Surgeon: Garry Elizabeth MD;  Location: [facility];  Service: Cardiology             03/05/25 1030   OT Last Visit   OT Visit Date 03/05/25   Note Type   Note type Evaluation   Pain Assessment   Pain Assessment Tool 0-10   Pain Score 8   Pain Location/Orientation Orientation: Right;Location: Knee   Patient's Stated Pain Goal No pain   Hospital Pain Intervention(s) Repositioned;Ambulation/increased activity   Restrictions/Precautions   Weight Bearing Precautions Per Order No   Other Precautions Cardiac/sternal;Chair Alarm;Multiple lines;Telemetry;O2;Fall Risk;Pain   Home Living   Type of Home House   Home Layout Two level;Able to live on main level with bedroom/bathroom;Stairs to enter with rails  (5 HAKAN, FFSU if needed)   Bathroom Shower/Tub Walk-in shower   Bathroom Toilet Standard   Bathroom Equipment Grab bars in shower;Shower chair;Grab bars around toilet   Bathroom Accessibility Accessible   Additional Comments Pt lives in a 2 SH with 5 HAKAN, has a FFSU if needed.   Prior  "Function   Level of [place] Independent with ADLs;Independent with functional mobility;Independent with IADLS   Lives With Spouse;Son;Daughter;Family  (+ grand children)   Receives Help From Family   IADLs Independent with driving;Independent with meal prep;Independent with medication management   Falls in the last 6 months 1 to 4   Vocational Retired   Lifestyle   Autonomy Pta pt I with ADL, IADL and functional mobility, (+) .   Reciprocal Relationships supportive family   Service to Others retired   Intrinsic Gratification enjoys time with his grand children   General   Family/Caregiver Present Yes  (spouse)   Subjective   Subjective \"I'm ready to go!\"   ADL   Where Assessed Chair   Eating Assistance 6  Modified independent   Grooming Assistance 5  Supervision/Setup   UB Bathing Assistance 5  Supervision/Setup   LB Bathing Assistance 4  Minimal Assistance   UB Dressing Assistance 5  Supervision/Setup   LB Dressing Assistance 4  Minimal Assistance   Toileting Assistance  4  Minimal Assistance   Functional Assistance 4  Minimal Assistance   Bed Mobility   Additional Comments Pt greeted OOB in chair, left in chair with alarm on and all needs within reach.   Transfers   Sit to Stand 4  Minimal assistance   Additional items Assist x 1;Verbal cues   Stand to Sit 4  Minimal assistance   Additional items Assist x 1;Verbal cues   Additional Comments with RW   Functional Mobility   Functional Mobility 4  Minimal assistance   Additional Comments Pt performs household distance mobility with MIN A x 1 with RW and chair follow, extra assist for lines.   Additional items Rolling walker   Balance   Static Sitting Fair +   Dynamic Sitting Fair   Static Standing Poor +   Dynamic Standing Poor +   Ambulatory Poor +   Activity Tolerance   Activity Tolerance Patient tolerated treatment well   Medical Staff Made Aware Co-eval with DPT due to medical complexity.   Nurse Made Aware RN cleared for therapy, assisted.   RUE " Assessment   RUE Assessment WFL   LUE Assessment   LUE Assessment WFL   Hand Function   Gross Motor Coordination Functional   Fine Motor Coordination Functional   Sensation   Light Touch No apparent deficits   Cognition   Overall Cognitive Status WFL   Arousal/Participation Cooperative;Alert   Attention Within functional limits   Orientation Level Oriented X4   Memory Within functional limits   Following Commands Follows one step commands without difficulty   Comments Pt pleasant and cooperative, with some decreased insight to deficits.   Assessment   Limitation Decreased ADL status;Decreased Safe judgement during ADL;Decreased endurance;Decreased self-care trans;Decreased high-level ADLs   Prognosis Good   Assessment Pt is a 60 y.o. male who was admitted to [facility] on 3/2/2025 with Cardiogenic shock (HCC), with plan for Impella assisted PCI on Friday. Pt seen for an OT evaluation per active OT orders.  Pt  has a past medical history of Heart attack (HCC) and Wegener's granulomatosis with renal involvement (HCC). Pt lives in a [facility] with 5 HAKAN, able to use a FFSU with walk-in shower with seat/GB and standard toilet with GB. Pta, pt was independent w/ ADL/IADL and functional mobility, was (+) driving and was not using any DME at baseline. Currently, pt is Supervision for UB ADL, Min Ax1 for LB ADL, and completed transfers/FM w Min Ax1 with RW. Pt currently presents with impairments in the following categories -steps to enter environment, difficulty performing ADLS, and limited insight into deficits activity tolerance and endurance. These impairments, as well as pt's fatigue, SOB, CARRILLO, pain, and risk for falls  limit pt's ability to safely engage in all baseline areas of occupation, includinggrooming, bathing, dressing, toileting, and functional mobility/transfers.  The patient's raw score on the AM-PAC Daily Activity Inpatient Short Form is 19. A raw score of greater than or equal to 19 suggests the  patient may benefit from discharge to home. Please refer to the recommendation of the Occupational Therapist for safe discharge planning. Pt would benefit from continued acute OT services throughout hospital course and following D/C. Plan for OT interventions 2-3x per week. From OT standpoint, recommend home with social support, no further OT needs pending progress upon D/C. Pt was left seated in bedside chair with chair alarm on and all needs within reach.   Goals   Patient Goals to have a successful procedure on Friday   Plan   Treatment Interventions ADL retraining;Functional transfer training;Endurance training;Continued evaluation;Energy conservation   Goal Expiration Date 03/19/25   OT Frequency 2-3x/wk   Discharge Recommendation   Rehab Resource Intensity Level, OT No post-acute rehabilitation needs  (pending progress)   AM-PAC Daily Activity Inpatient   Lower Body Dressing 3   Bathing 3   Toileting 3   Upper Body Dressing 3   Grooming 3   Eating 4   Daily Activity Raw Score 19   Daily Activity Standardized Score (Calc for Raw Score >=11) 40.22   AM-PAC Applied Cognition Inpatient   Following a Speech/Presentation 4   Understanding Ordinary Conversation 4   Taking Medications 4   Remembering Where Things Are Placed or Put Away 4   Remembering List of 4-5 Errands 4   Taking Care of Complicated Tasks 3   Applied Cognition Raw Score 23   Applied Cognition Standardized Score 53.08   Modified Julian Scale   Modified Julian Scale 4   End of Consult   Education Provided Yes   Patient Position at End of Consult Bedside chair;Bed/Chair alarm activated;All needs within reach   Nurse Communication Nurse aware of consult       OT Goals    - Pt will be Mod I with LB ADL by end of hospital course.    - Pt will be Mod I with UB ADL by end of hospital course.    - Pt will be Mod I with all functional transfers required for patient safety by end of hospital course.    - Pt will be Mod I with functional mobility to/from  bathroom for increased independence with toileting tasks.    - Pt will independently recall and implement safety precautions during OT sessions.     - Pt activity tolerance will increase to 30 minutes in order to safely engage in ADL and transfers.     - Pt standing tolerance will increase to 5 minutes  in order to promote sink side ADLs and IADL activities.            SHAHIDA Gamboa, OTR/L

## 2025-03-05 NOTE — ASSESSMENT & PLAN NOTE
Presented to [facility] with chest pain, diaphoresis, nausea  EKG without obvious acute ischemic changes   STAT TTE from 3/2:  Left Ventricle: Left ventricular cavity size is dilated with LVEDD of 6.5 cm. 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 No apical thrombus visualized. The left ventricular ejection fraction is 15-20%. Systolic function is severely reduced. There is severe global hypokinesis with apical aneurysmal dilatation and akinesis of the mid to apical anterior region. There is thinning and akinesia in the basal to mid inferior consistent with transmural myocardial infarction. Diastolic function is severely abnormal, consistent with Grade III restrictive diastolic dysfunction.  IVS: There is abnormal septal motion of unclear etiology.  Right Ventricle: Systolic function is moderately reduced.  Left Atrium: The atrium is severely dilated.  Aortic Valve: The aortic valve is trileaflet. The leaflets are severely thickened. The leaflets are severely calcified. There is severely reduced mobility. Low flow, low gradient aortic stenosis cannot be excluded. There is moderate regurgitation.  Mitral Valve: There is moderate annular calcification. There is moderate to severe regurgitation with an eccentrically anteriorly directed jet.  Aorta: The aortic root is mildly dilated. The ascending aorta is normal in size.  Does have known hx of PCI to LAD 2014 but no known hx of heart failure but did used to take 80mg of IV lasix  Initial lactate 2.4, ScvO2 59, calculated Stefani CI of 1.5    Plan:  Discussed with cardiology and interventional cardiology  Taken to the cath lab where he was found to have LAD, LCx and RCA disease  No IABP placed due to moderate AI  Plan for Impella assisted PCI likely 3/7  Initially, on levophed for BP support but weaned to off once milrinone started  Milrinone 0.25  q6hr VBG and BMP/Mg  Diuresis with bumex prn  Heart failure consultation   Strict I&O's

## 2025-03-05 NOTE — ASSESSMENT & PLAN NOTE
Per family, no hx of CKD  Initial Cr 1.6 on arrival  Received bumex 1mg x 2 yesterday  Goal even to slightly negative fluid balance  Strict I&O's  q6 BMP's  Avoid nephrotoxic agents

## 2025-03-05 NOTE — ASSESSMENT & PLAN NOTE
In the setting of ischemic cardiomyopathy and decompensated heart failure  SCAI stage A/B  Currently on milrinone 0.25, SVO 2 of 63.4, hemoglobin of 14.7  Cardiac output of 3.4 L/min-cardiac index of 1.8   Warm and euvolemic  MAP of 70-75  LFTs improving, kidney function stable  I/O: Out 1.46 L, net +400 mL, creatinine improving  Given patient was having multiple runs of NSVT, was started on amiodarone yesterday  No further fevers    Plan:  Overall volume status is improving, and doing well on 0.25 of milrinone, continue  Planning for Impella assisted PCI on Friday  Continue Bumex 2 mg twice daily, aim for net -500 cc  Agree with amiodarone for increased ectopy

## 2025-03-05 NOTE — PROGRESS NOTES
Pastoral Care Progress Note             03/05/25 1400   Clinical Encounter Type   Visited With Patient and family together  (Patient's wife was present)   Referral From        Patient is not practicing but was raised [employer].  spoke with patient and his wife about his family and background. Patient expressed feeling very blessed to have family support.  remains available as needed.

## 2025-03-06 ENCOUNTER — PATIENT OUTREACH (OUTPATIENT)
Dept: CARDIOLOGY CLINIC | Facility: CLINIC | Age: 60
End: 2025-03-06

## 2025-03-06 LAB
ALBUMIN SERPL BCG-MCNC: 3.4 G/DL (ref 3.5–5)
ALP SERPL-CCNC: 46 U/L (ref 34–104)
ALT SERPL W P-5'-P-CCNC: 72 U/L (ref 7–52)
ANION GAP SERPL CALCULATED.3IONS-SCNC: 10 MMOL/L (ref 4–13)
ANION GAP SERPL CALCULATED.3IONS-SCNC: 9 MMOL/L (ref 4–13)
APTT PPP: 83 SECONDS (ref 23–34)
ARTERIAL PATENCY WRIST A: NO
AST SERPL W P-5'-P-CCNC: 169 U/L (ref 13–39)
BASE EX.OXY STD BLDV CALC-SCNC: 53.2 % (ref 60–80)
BASE EX.OXY STD BLDV CALC-SCNC: 56 % (ref 60–80)
BASE EXCESS BLDV CALC-SCNC: 3.8 MMOL/L
BASE EXCESS BLDV CALC-SCNC: 5.9 MMOL/L
BILIRUB DIRECT SERPL-MCNC: 0.32 MG/DL (ref 0–0.2)
BILIRUB SERPL-MCNC: 1.8 MG/DL (ref 0.2–1)
BODY TEMPERATURE: 99.3 DEGREES FEHRENHEIT
BUN SERPL-MCNC: 38 MG/DL (ref 5–25)
BUN SERPL-MCNC: 45 MG/DL (ref 5–25)
CA-I BLD-SCNC: 1.03 MMOL/L (ref 1.12–1.32)
CALCIUM SERPL-MCNC: 8.3 MG/DL (ref 8.4–10.2)
CALCIUM SERPL-MCNC: 9 MG/DL (ref 8.4–10.2)
CHLORIDE SERPL-SCNC: 91 MMOL/L (ref 96–108)
CHLORIDE SERPL-SCNC: 91 MMOL/L (ref 96–108)
CO2 SERPL-SCNC: 31 MMOL/L (ref 21–32)
CO2 SERPL-SCNC: 33 MMOL/L (ref 21–32)
CREAT SERPL-MCNC: 1.89 MG/DL (ref 0.6–1.3)
CREAT SERPL-MCNC: 1.95 MG/DL (ref 0.6–1.3)
ERYTHROCYTE [DISTWIDTH] IN BLOOD BY AUTOMATED COUNT: 13.3 % (ref 11.6–15.1)
GFR SERPL CREATININE-BSD FRML MDRD: 36 ML/MIN/1.73SQ M
GFR SERPL CREATININE-BSD FRML MDRD: 37 ML/MIN/1.73SQ M
GLUCOSE SERPL-MCNC: 115 MG/DL (ref 65–140)
GLUCOSE SERPL-MCNC: 127 MG/DL (ref 65–140)
HCO3 BLDV-SCNC: 29.3 MMOL/L (ref 24–30)
HCO3 BLDV-SCNC: 31.4 MMOL/L (ref 24–30)
HCT VFR BLD AUTO: 43.7 % (ref 36.5–49.3)
HGB BLD-MCNC: 14.2 G/DL (ref 12–17)
MAGNESIUM SERPL-MCNC: 2 MG/DL (ref 1.9–2.7)
MCH RBC QN AUTO: 29.6 PG (ref 26.8–34.3)
MCHC RBC AUTO-ENTMCNC: 32.5 G/DL (ref 31.4–37.4)
MCV RBC AUTO: 91 FL (ref 82–98)
NASAL CANNULA: 1
NASAL CANNULA: 2
O2 CT BLDV-SCNC: 11.6 ML/DL
O2 CT BLDV-SCNC: 11.9 ML/DL
PCO2 BLD: 47.8 MM HG (ref 42–50)
PCO2 BLDV: 47 MM HG (ref 42–50)
PCO2 BLDV: 48.2 MM HG (ref 42–50)
PH BLD: 7.41 [PH] (ref 7.3–7.4)
PH BLDV: 7.41 [PH] (ref 7.3–7.4)
PH BLDV: 7.43 [PH] (ref 7.3–7.4)
PHOSPHATE SERPL-MCNC: 4.3 MG/DL (ref 2.7–4.5)
PLATELET # BLD AUTO: 249 THOUSANDS/UL (ref 149–390)
PMV BLD AUTO: 11 FL (ref 8.9–12.7)
PO2 BLDV: 29.9 MM HG (ref 35–45)
PO2 BLDV: 31.9 MM HG (ref 35–45)
PO2 VENOUS TEMP CORRECTED: 32.8 MM HG (ref 35–45)
POTASSIUM SERPL-SCNC: 3.6 MMOL/L (ref 3.5–5.3)
POTASSIUM SERPL-SCNC: 4 MMOL/L (ref 3.5–5.3)
PROT SERPL-MCNC: 6.5 G/DL (ref 6.4–8.4)
RBC # BLD AUTO: 4.79 MILLION/UL (ref 3.88–5.62)
SODIUM SERPL-SCNC: 132 MMOL/L (ref 135–147)
SODIUM SERPL-SCNC: 133 MMOL/L (ref 135–147)
WBC # BLD AUTO: 10.19 THOUSAND/UL (ref 4.31–10.16)

## 2025-03-06 PROCEDURE — 85730 THROMBOPLASTIN TIME PARTIAL: CPT | Performed by: PHYSICIAN ASSISTANT

## 2025-03-06 PROCEDURE — 83735 ASSAY OF MAGNESIUM: CPT | Performed by: PHYSICIAN ASSISTANT

## 2025-03-06 PROCEDURE — 80076 HEPATIC FUNCTION PANEL: CPT | Performed by: PHYSICIAN ASSISTANT

## 2025-03-06 PROCEDURE — 99233 SBSQ HOSP IP/OBS HIGH 50: CPT | Performed by: STUDENT IN AN ORGANIZED HEALTH CARE EDUCATION/TRAINING PROGRAM

## 2025-03-06 PROCEDURE — 99232 SBSQ HOSP IP/OBS MODERATE 35: CPT | Performed by: INTERNAL MEDICINE

## 2025-03-06 PROCEDURE — 80048 BASIC METABOLIC PNL TOTAL CA: CPT | Performed by: PHYSICIAN ASSISTANT

## 2025-03-06 PROCEDURE — 82805 BLOOD GASES W/O2 SATURATION: CPT | Performed by: PHYSICIAN ASSISTANT

## 2025-03-06 PROCEDURE — 84100 ASSAY OF PHOSPHORUS: CPT | Performed by: PHYSICIAN ASSISTANT

## 2025-03-06 PROCEDURE — 85027 COMPLETE CBC AUTOMATED: CPT | Performed by: PHYSICIAN ASSISTANT

## 2025-03-06 PROCEDURE — 82330 ASSAY OF CALCIUM: CPT | Performed by: PHYSICIAN ASSISTANT

## 2025-03-06 RX ORDER — POLYETHYLENE GLYCOL 3350 17 G/17G
17 POWDER, FOR SOLUTION ORAL DAILY PRN
Status: DISCONTINUED | OUTPATIENT
Start: 2025-03-06 | End: 2025-03-07

## 2025-03-06 RX ORDER — CALCIUM GLUCONATE 20 MG/ML
2 INJECTION, SOLUTION INTRAVENOUS ONCE
Status: COMPLETED | OUTPATIENT
Start: 2025-03-06 | End: 2025-03-06

## 2025-03-06 RX ORDER — POTASSIUM CHLORIDE 1500 MG/1
40 TABLET, EXTENDED RELEASE ORAL ONCE
Status: COMPLETED | OUTPATIENT
Start: 2025-03-06 | End: 2025-03-06

## 2025-03-06 RX ADMIN — POLYETHYLENE GLYCOL 3350 17 G: 17 POWDER, FOR SOLUTION ORAL at 08:09

## 2025-03-06 RX ADMIN — PREDNISONE 5 MG: 10 TABLET ORAL at 20:12

## 2025-03-06 RX ADMIN — HYDROMORPHONE HYDROCHLORIDE 0.2 MG: 0.2 INJECTION, SOLUTION INTRAMUSCULAR; INTRAVENOUS; SUBCUTANEOUS at 16:18

## 2025-03-06 RX ADMIN — HEPARIN SODIUM 12 UNITS/KG/HR: 10000 INJECTION, SOLUTION INTRAVENOUS at 05:07

## 2025-03-06 RX ADMIN — CHLORHEXIDINE GLUCONATE 15 ML: 1.2 SOLUTION ORAL at 20:12

## 2025-03-06 RX ADMIN — TICAGRELOR 90 MG: 90 TABLET ORAL at 20:12

## 2025-03-06 RX ADMIN — CALCIUM GLUCONATE 2 G: 20 INJECTION, SOLUTION INTRAVENOUS at 06:09

## 2025-03-06 RX ADMIN — ATORVASTATIN CALCIUM 80 MG: 80 TABLET, FILM COATED ORAL at 15:57

## 2025-03-06 RX ADMIN — BUMETANIDE 2 MG: 0.25 INJECTION INTRAMUSCULAR; INTRAVENOUS at 15:57

## 2025-03-06 RX ADMIN — SENNOSIDES AND DOCUSATE SODIUM 1 TABLET: 50; 8.6 TABLET ORAL at 08:05

## 2025-03-06 RX ADMIN — CHLORHEXIDINE GLUCONATE 15 ML: 1.2 SOLUTION ORAL at 08:05

## 2025-03-06 RX ADMIN — HYDROMORPHONE HYDROCHLORIDE 0.2 MG: 0.2 INJECTION, SOLUTION INTRAMUSCULAR; INTRAVENOUS; SUBCUTANEOUS at 23:13

## 2025-03-06 RX ADMIN — ASPIRIN 81 MG: 81 TABLET, CHEWABLE ORAL at 08:05

## 2025-03-06 RX ADMIN — AMIODARONE HYDROCHLORIDE 0.5 MG/MIN: 50 INJECTION, SOLUTION INTRAVENOUS at 17:20

## 2025-03-06 RX ADMIN — PREDNISONE 10 MG: 10 TABLET ORAL at 05:07

## 2025-03-06 RX ADMIN — SENNOSIDES AND DOCUSATE SODIUM 1 TABLET: 50; 8.6 TABLET ORAL at 17:20

## 2025-03-06 RX ADMIN — MILRINONE LACTATE IN DEXTROSE 0.25 MCG/KG/MIN: 200 INJECTION, SOLUTION INTRAVENOUS at 15:57

## 2025-03-06 RX ADMIN — POTASSIUM CHLORIDE 40 MEQ: 1500 TABLET, EXTENDED RELEASE ORAL at 07:06

## 2025-03-06 RX ADMIN — MILRINONE LACTATE IN DEXTROSE 0.25 MCG/KG/MIN: 200 INJECTION, SOLUTION INTRAVENOUS at 01:13

## 2025-03-06 RX ADMIN — BUMETANIDE 2 MG: 0.25 INJECTION INTRAMUSCULAR; INTRAVENOUS at 20:12

## 2025-03-06 RX ADMIN — BUMETANIDE 2 MG: 0.25 INJECTION INTRAMUSCULAR; INTRAVENOUS at 08:05

## 2025-03-06 RX ADMIN — TICAGRELOR 90 MG: 90 TABLET ORAL at 08:05

## 2025-03-06 RX ADMIN — HYDROMORPHONE HYDROCHLORIDE 0.2 MG: 0.2 INJECTION, SOLUTION INTRAMUSCULAR; INTRAVENOUS; SUBCUTANEOUS at 10:40

## 2025-03-06 NOTE — PROGRESS NOTES
Progress Note - Heart Failure   Name: Rodney Pizarro 60 y.o. male I MRN: [id number]  Unit/Bed#: PPHP-312-01 I Date of Admission: 3/2/2025   Date of Service: 3/6/2025 I Hospital Day: 4    Assessment & Plan  Cardiogenic shock (HCC)  In the setting of ischemic cardiomyopathy and decompensated heart failure  SCAI stage A/B  Currently on milrinone 0.25, SVO 2 of 53.4, hemoglobin of 14.2  Warm and euvolemic  MAP of 75-80  LFTs improving, kidney function stable  I/O: Out 1.6L, net -288 cc,  Continue to have runs of NSVT, on amiodarone drip  No further fevers    Plan:  Overall volume status is improving, and doing well on 0.25 of milrinone, continue  Planning for Impella assisted PCI on Friday  Continue Bumex 2 mg 3 times daily, aim for net -500 cc  Can continue amiodarone  Severe mitral regurgitation  Likely functional in the setting of dilated cardiomyopathy  Moderate aortic insufficiency  Also noted to have moderate AI and low-flow low gradient AS  Coronary artery disease involving native coronary artery of native heart with unstable angina pectoris (HCC)  Has prior history of MI in 2011 and 2014 s/p PCI to LAD and RCA  Presented with chest pain diaphoresis and elevated troponin  EKG did not reveal ST elevations, did have inferior Q waves and right bundle branch block    Left heart cath revealed 100% proximal RCA lesion, 100% LAD lesion and 90% ostial left circumflex lesion.     Has been loaded with aspirin and Brilinta  Currently on heparin drip  Planning for optimization of heart failure and shock, will with eventual plan of Impella assisted PCI of the left circumflex    Unstable angina (HCC)    Wegener's granulomatosis with renal involvement (HCC)    Acute kidney injury (HCC)    Palliative care encounter        Subjective : No significant overnight events.    Objective :  Temp:  [99.1 °F (37.3 °C)-99.9 °F (37.7 °C)] 99.5 °F (37.5 °C)  HR:  [] 113  BP: ()/(51-81) 106/74  Resp:  [20-49] 45  SpO2:  [95 %-99  %] 97 %  O2 Device: Nasal cannula  Nasal Cannula O2 Flow Rate (L/min):  [1 L/min-1.5 L/min] 1.5 L/min    Physical Exam  Constitutional:       Appearance: Normal appearance. He is not ill-appearing.   Cardiovascular:      Rate and Rhythm: Normal rate and regular rhythm.      Heart sounds: No murmur heard.  Pulmonary:      Effort: Pulmonary effort is normal.      Breath sounds: No rales.   Musculoskeletal:      Right lower leg: No edema.      Left lower leg: No edema.   Skin:     General: Skin is warm.   Neurological:      General: No focal deficit present.      Mental Status: He is alert and oriented to person, place, and time.         Lab Results: I have reviewed the following results:CBC/BMP:   .     03/06/25  0432   WBC 10.19*   HGB 14.2   HCT 43.7      SODIUM 133*   K 3.6   CL 91*   CO2 33*   BUN 38*   CREATININE 1.89*   GLUC 127   CAIONIZED 1.03*   MG 2.0   PHOS 4.3    , Creatinine Clearance: Estimated Creatinine Clearance: 39.3 mL/min (A) (by C-G formula based on SCr of 1.89 mg/dL (H)).    Imaging Results Review: No pertinent imaging studies reviewed.      VTE Pharmacologic Prophylaxis: VTE covered by:  heparin (porcine), Intravenous, 12 Units/kg/hr at 03/06/25 0507  heparin (porcine), Intravenous  heparin (porcine), Intravenous     VTE Mechanical Prophylaxis: sequential compression device

## 2025-03-06 NOTE — ASSESSMENT & PLAN NOTE
In the setting of ischemic cardiomyopathy and decompensated heart failure  SCAI stage A/B  Currently on milrinone 0.25, SVO 2 of 53.4, hemoglobin of 14.2  Warm and euvolemic  MAP of 75-80  LFTs improving, kidney function stable  I/O: Out 1.6L, net -288 cc,  Continue to have runs of NSVT, on amiodarone drip  No further fevers    Plan:  Overall volume status is improving, and doing well on 0.25 of milrinone, continue  Planning for Impella assisted PCI on Friday  Continue Bumex 2 mg 3 times daily, aim for net -500 cc  Can continue amiodarone

## 2025-03-06 NOTE — ASSESSMENT & PLAN NOTE
Presented to [facility] with chest pain, diaphoresis, nausea  EKG without obvious acute ischemic changes   STAT TTE from 3/2:  Left Ventricle: Left ventricular cavity size is dilated with LVEDD of 6.5 cm. 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 No apical thrombus visualized. The left ventricular ejection fraction is 15-20%. Systolic function is severely reduced. There is severe global hypokinesis with apical aneurysmal dilatation and akinesis of the mid to apical anterior region. There is thinning and akinesia in the basal to mid inferior consistent with transmural myocardial infarction. Diastolic function is severely abnormal, consistent with Grade III restrictive diastolic dysfunction.  IVS: There is abnormal septal motion of unclear etiology.  Right Ventricle: Systolic function is moderately reduced.  Left Atrium: The atrium is severely dilated.  Aortic Valve: The aortic valve is trileaflet. The leaflets are severely thickened. The leaflets are severely calcified. There is severely reduced mobility. Low flow, low gradient aortic stenosis cannot be excluded. There is moderate regurgitation.  Mitral Valve: There is moderate annular calcification. There is moderate to severe regurgitation with an eccentrically anteriorly directed jet.  Aorta: The aortic root is mildly dilated. The ascending aorta is normal in size.  Does have known hx of PCI to LAD 2014 but no known hx of heart failure but did used to take 80mg of IV lasix  Initial lactate 2.4, ScvO2 59, calculated Stefani CI of 1.5    Plan:  Discussed with cardiology and interventional cardiology  Taken to the cath lab where he was found to have LAD, LCx and RCA disease  No IABP placed due to moderate AI  Plan for Impella assisted PCI likely 3/7  Initially, on levophed for BP support but weaned to off once milrinone started  Milrinone 0.25  q6hr VBG and BMP/Mg  Diuresis with Bumex 2mg TID  Heart failure consultation   Strict I&O's

## 2025-03-06 NOTE — PROGRESS NOTES
Progress Note - Critical Care/ICU   Name: Rodney Pizarro 60 y.o. male I MRN: [id number]  Unit/Bed#: PPHP-312-01 I Date of Admission: 3/2/2025   Date of Service: 3/6/2025 I Hospital Day: 4      Assessment & Plan  Cardiogenic shock (HCC)  Presented to [facility] with chest pain, diaphoresis, nausea  EKG without obvious acute ischemic changes   STAT TTE from 3/2:  Left Ventricle: Left ventricular cavity size is dilated with LVEDD of 6.5 cm. 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 No apical thrombus visualized. The left ventricular ejection fraction is 15-20%. Systolic function is severely reduced. There is severe global hypokinesis with apical aneurysmal dilatation and akinesis of the mid to apical anterior region. There is thinning and akinesia in the basal to mid inferior consistent with transmural myocardial infarction. Diastolic function is severely abnormal, consistent with Grade III restrictive diastolic dysfunction.  IVS: There is abnormal septal motion of unclear etiology.  Right Ventricle: Systolic function is moderately reduced.  Left Atrium: The atrium is severely dilated.  Aortic Valve: The aortic valve is trileaflet. The leaflets are severely thickened. The leaflets are severely calcified. There is severely reduced mobility. Low flow, low gradient aortic stenosis cannot be excluded. There is moderate regurgitation.  Mitral Valve: There is moderate annular calcification. There is moderate to severe regurgitation with an eccentrically anteriorly directed jet.  Aorta: The aortic root is mildly dilated. The ascending aorta is normal in size.  Does have known hx of PCI to LAD 2014 but no known hx of heart failure but did used to take 80mg of IV lasix  Initial lactate 2.4, ScvO2 59, calculated Stefani CI of 1.5    Plan:  Discussed with cardiology and interventional cardiology  Taken to the cath lab where he was found to have LAD, LCx and RCA disease  No IABP placed due to moderate AI  Plan for Impella assisted PCI likely  3/7  Initially, on levophed for BP support but weaned to off once milrinone started  Milrinone 0.25  q6hr VBG and BMP/Mg  Diuresis with Bumex 2mg TID  Heart failure consultation   Strict I&O's  Unstable angina (HCC)  Ongoing chest pain 8/10 crushing/burning chest pain on arrival to [facility]  SBP prohibitive of initiating nitro gtt  Dilaudid PRN for pain control   Overall improved  Coronary artery disease involving native coronary artery of native heart with unstable angina pectoris (HCC)  Previous hx of stents to LAD per wife - stopped taking medications 4-5 years ago  3/2 cardiac cath:   100 % proximal LAD occlusion at the site of previous stenting . There is also a 70% ostail LAD lesion.  Large L Cx artery with a 90 % ostial lesion.  100 % RCA occlusion proximally.  Plan for Impella assisted PCI likely 3/7  ASA. Statin. Heparin infusion.  CTS consult - no surgical intervention planned  Wegener's granulomatosis with renal involvement (HCC)  On daily prednisone per pt - continue as ordered  10mg Qam  5mg QHS  Acute kidney injury (HCC)  Per family, no hx of CKD  Initial Cr 1.6 on arrival  Strict I&O's  q6 BMP's  Avoid nephrotoxic agents  Severe mitral regurgitation  Volume removal and medical management  Moderate aortic insufficiency  Volume removal and medical management  Palliative care encounter    Disposition: Critical care    ICU Core Measures     A: Assess, Prevent, and Manage Pain Has pain been assessed? Yes  Need for changes to pain regimen? No   B: Both SAT/SAT  N/A   C: Choice of Sedation RASS Goal: N/A patient not on sedation  Need for changes to sedation or analgesia regimen? NA   D: Delirium CAM-ICU: Negative   E: Early Mobility  Plan for early mobility? Yes   F: Family Engagement Plan for family engagement today? Yes       Review of Invasive Devices:    Satish Plan: Continue for accurate I/O monitoring for 48 hours  Central access plan: Medications requiring central line Hemodynamic  monitoring      Prophylaxis:  VTE VTE covered by:  heparin (porcine), Intravenous, 12 Units/kg/hr at 25 0507  heparin (porcine), Intravenous  heparin (porcine), Intravenous       Stress Ulcer  not ordered         24 Hour Events : No overnight events.   Subjective       Objective :                   Vitals I/O      Most Recent Min/Max in 24hrs   Temp 99.1 °F (37.3 °C) Temp  Min: 99.1 °F (37.3 °C)  Max: 99.9 °F (37.7 °C)   Pulse 99 Pulse  Min: 99  Max: 110   Resp (!) 26 Resp  Min: 20  Max: 49   BP 91/67 BP  Min: 87/64  Max: 104/70   O2 Sat 97 % SpO2  Min: 95 %  Max: 99 %      Intake/Output Summary (Last 24 hours) at 3/6/2025 0519  Last data filed at 3/6/2025 0200  Gross per 24 hour   Intake 1268.4 ml   Output 1455 ml   Net -186.6 ml       Diet Cardiovascular; Cardiac    Invasive Monitoring           Physical Exam   Physical Exam  Vitals and nursing note reviewed.   Eyes:      Pupils: Pupils are equal, round, and reactive to light.   Skin:     General: Skin is warm and dry.   HENT:      Head: Normocephalic and atraumatic.   Cardiovascular:      Rate and Rhythm: Regular rhythm. Tachycardia present.      Heart sounds: Normal heart sounds.      Comments: Warm and well perfused  Musculoskeletal:      Right lower le+ Edema present.      Left lower le+ Edema present.   Abdominal: General: There is no distension.      Palpations: Abdomen is soft.      Tenderness: There is no abdominal tenderness.   Constitutional:       General: He is not in acute distress.     Appearance: He is well-developed and well-nourished.   Pulmonary:      Effort: Pulmonary effort is normal.      Breath sounds: Normal breath sounds.   Psychiatric:         Behavior: Behavior is cooperative.   Neurological:      General: No focal deficit present.      Mental Status: He is alert and oriented to person, place, and time.      GCS: GCS eye subscore is 4. GCS verbal subscore is 5. GCS motor subscore is 6.          Diagnostic Studies        Lab  Results: I have reviewed the following results:     Medications:  Scheduled PRN   aspirin, 81 mg, Daily  atorvastatin, 80 mg, Daily With Dinner  bumetanide, 2 mg, TID  chlorhexidine, 15 mL, Q12H ANDRESSA  oxymetazoline, 2 spray, Q12H ANDRESSA  predniSONE, 10 mg, Early Morning  predniSONE, 5 mg, HS  senna-docusate sodium, 1 tablet, BID  ticagrelor, 90 mg, Q12H ANDRESSA      acetaminophen, 650 mg, Q6H PRN  bisacodyl, 10 mg, Daily PRN  heparin (porcine), 2,000 Units, Q6H PRN  heparin (porcine), 4,000 Units, Q6H PRN  HYDROmorphone, 0.2 mg, Q2H PRN   Or  HYDROmorphone, 0.5 mg, Q2H PRN  ondansetron, 4 mg, Q6H PRN  trimethobenzamide, 200 mg, Q6H PRN       Continuous    amiodarone (CORDARONE) 900 mg in dextrose 5 % 500 mL infusion, 0.5 mg/min, Last Rate: 0.5 mg/min (03/05/25 1157)  heparin (porcine), 3-20 Units/kg/hr (Order-Specific), Last Rate: 12 Units/kg/hr (03/06/25 0507)  milrinone (Primacor) infusion, 0.25 mcg/kg/min, Last Rate: 0.25 mcg/kg/min (03/06/25 0113)         Labs:   CBC    Recent Labs     03/05/25  0404 03/06/25  0432   WBC 11.68* 10.19*   HGB 14.7 14.2   HCT 45.0 43.7    249     BMP    Recent Labs     03/05/25  0403 03/05/25  1611   SODIUM 136 134*   K 3.9 4.0   CL 93* 91*   CO2 33* 36*   AGAP 10 7   BUN 37* 40*   CREATININE 1.91* 2.11*   CALCIUM 8.6 8.5       Coags    Recent Labs     03/05/25  0359 03/06/25  0432   PTT 65* 83*        Additional Electrolytes  Recent Labs     03/04/25  2349 03/05/25  0403 03/06/25  0432   MG 2.1 2.2  --    CAIONIZED  --   --  1.03*          Blood Gas    No recent results  Recent Labs     03/06/25  0432   PHVEN 7.432*   ISJ8MGJ 48.2   PO2VEN 29.9*   OMS1VPN 31.4*   BEVEN 5.9   [id number] 53.2*    LFTs  Recent Labs     03/05/25  0403   *   *   ALKPHOS 47   ALB 3.4*   TBILI 2.48*       Infectious  Recent Labs     03/04/25  1047   PROCALCITONI 0.16     Glucose  Recent Labs     03/04/25  1757 03/04/25  2349 03/05/25  0403 03/05/25  1611   GLUC 142* 109 118 122

## 2025-03-06 NOTE — ASSESSMENT & PLAN NOTE
Per family, no hx of CKD  Initial Cr 1.6 on arrival  Strict I&O's  q6 BMP's  Avoid nephrotoxic agents

## 2025-03-06 NOTE — PROGRESS NOTES
Met with pt and spouse during Advanced Heart Failure Team rounds.    Explained that [employer] could take up to 30 days for determination and then will likely cover ED and Inpatient Hospitalization due to non-citizenship.    Requested Letter of Medical Necessity from Dr. Moyer and will forward to PATHS so they can attach letter to the application.    Also asked spouse to contact pt's  because Green Card Application is being reviewed. Encouraged spouse to forward Letter of Medical Necessity to pt's .    Explained that pt will need on-going medical coverage and not just [employer] .  Provided Private Insurance resources:    [email or web address]  Phone# [phone]    Wanda Giron  [phone]  [email or web address]  [address],  [address]  [place] [address]    Spouse will review the options with her family to see if they can afford private pay insurance.

## 2025-03-07 LAB
ALBUMIN SERPL BCG-MCNC: 3.4 G/DL (ref 3.5–5)
ALP SERPL-CCNC: 49 U/L (ref 34–104)
ALT SERPL W P-5'-P-CCNC: 56 U/L (ref 7–52)
ANION GAP SERPL CALCULATED.3IONS-SCNC: 8 MMOL/L (ref 4–13)
ANION GAP SERPL CALCULATED.3IONS-SCNC: 9 MMOL/L (ref 4–13)
APTT PPP: 85 SECONDS (ref 23–34)
AST SERPL W P-5'-P-CCNC: 96 U/L (ref 13–39)
ATRIAL RATE: 106 BPM
BASE EX.OXY STD BLDV CALC-SCNC: 41.4 % (ref 60–80)
BASE EX.OXY STD BLDV CALC-SCNC: 42.6 % (ref 60–80)
BASE EX.OXY STD BLDV CALC-SCNC: 65.2 % (ref 60–80)
BASE EXCESS BLDV CALC-SCNC: 1.3 MMOL/L
BASE EXCESS BLDV CALC-SCNC: 3.7 MMOL/L
BASE EXCESS BLDV CALC-SCNC: 6.3 MMOL/L
BILIRUB DIRECT SERPL-MCNC: 0.37 MG/DL (ref 0–0.2)
BILIRUB SERPL-MCNC: 1.69 MG/DL (ref 0.2–1)
BODY TEMPERATURE: 99.9 DEGREES FEHRENHEIT
BUN SERPL-MCNC: 40 MG/DL (ref 5–25)
BUN SERPL-MCNC: 42 MG/DL (ref 5–25)
CA-I BLD-SCNC: 1.06 MMOL/L (ref 1.12–1.32)
CALCIUM SERPL-MCNC: 8.7 MG/DL (ref 8.4–10.2)
CALCIUM SERPL-MCNC: 8.9 MG/DL (ref 8.4–10.2)
CHLORIDE SERPL-SCNC: 89 MMOL/L (ref 96–108)
CHLORIDE SERPL-SCNC: 90 MMOL/L (ref 96–108)
CO2 SERPL-SCNC: 30 MMOL/L (ref 21–32)
CO2 SERPL-SCNC: 34 MMOL/L (ref 21–32)
CREAT SERPL-MCNC: 1.98 MG/DL (ref 0.6–1.3)
CREAT SERPL-MCNC: 2.03 MG/DL (ref 0.6–1.3)
ERYTHROCYTE [DISTWIDTH] IN BLOOD BY AUTOMATED COUNT: 13.2 % (ref 11.6–15.1)
GFR SERPL CREATININE-BSD FRML MDRD: 34 ML/MIN/1.73SQ M
GFR SERPL CREATININE-BSD FRML MDRD: 35 ML/MIN/1.73SQ M
GLUCOSE SERPL-MCNC: 118 MG/DL (ref 65–140)
GLUCOSE SERPL-MCNC: 120 MG/DL (ref 65–140)
HCO3 BLDV-SCNC: 27.3 MMOL/L (ref 24–30)
HCO3 BLDV-SCNC: 29.7 MMOL/L (ref 24–30)
HCO3 BLDV-SCNC: 32.4 MMOL/L (ref 24–30)
HCT VFR BLD AUTO: 43.5 % (ref 36.5–49.3)
HGB BLD-MCNC: 14.1 G/DL (ref 12–17)
KCT BLD-ACNC: 256 SEC (ref 89–137)
KCT BLD-ACNC: 287 SEC (ref 89–137)
MAGNESIUM SERPL-MCNC: 1.8 MG/DL (ref 1.9–2.7)
MCH RBC QN AUTO: 29.6 PG (ref 26.8–34.3)
MCHC RBC AUTO-ENTMCNC: 32.4 G/DL (ref 31.4–37.4)
MCV RBC AUTO: 91 FL (ref 82–98)
NASAL CANNULA: 1.5
O2 CT BLDV-SCNC: 13.8 ML/DL
O2 CT BLDV-SCNC: 8.7 ML/DL
O2 CT BLDV-SCNC: 9.1 ML/DL
P AXIS: 58 DEGREES
PCO2 BLDV: 48.6 MM HG (ref 42–50)
PCO2 BLDV: 49.7 MM HG (ref 42–50)
PCO2 BLDV: 51.9 MM HG (ref 42–50)
PH BLDV: 7.37 [PH] (ref 7.3–7.4)
PH BLDV: 7.39 [PH] (ref 7.3–7.4)
PH BLDV: 7.41 [PH] (ref 7.3–7.4)
PHOSPHATE SERPL-MCNC: 4.4 MG/DL (ref 2.7–4.5)
PLATELET # BLD AUTO: 275 THOUSANDS/UL (ref 149–390)
PMV BLD AUTO: 11.1 FL (ref 8.9–12.7)
PO2 BLDV: 25.9 MM HG (ref 35–45)
PO2 BLDV: 26.6 MM HG (ref 35–45)
PO2 BLDV: 36.3 MM HG (ref 35–45)
POTASSIUM SERPL-SCNC: 3.7 MMOL/L (ref 3.5–5.3)
POTASSIUM SERPL-SCNC: 4.5 MMOL/L (ref 3.5–5.3)
PR INTERVAL: 178 MS
PROT SERPL-MCNC: 6.7 G/DL (ref 6.4–8.4)
QRS AXIS: -69 DEGREES
QRSD INTERVAL: 162 MS
QT INTERVAL: 428 MS
QTC INTERVAL: 568 MS
RBC # BLD AUTO: 4.77 MILLION/UL (ref 3.88–5.62)
SODIUM SERPL-SCNC: 129 MMOL/L (ref 135–147)
SODIUM SERPL-SCNC: 131 MMOL/L (ref 135–147)
SPECIMEN SOURCE: ABNORMAL
SPECIMEN SOURCE: ABNORMAL
T WAVE AXIS: 69 DEGREES
VENTRICULAR RATE: 106 BPM
WBC # BLD AUTO: 8.72 THOUSAND/UL (ref 4.31–10.16)

## 2025-03-07 PROCEDURE — 82805 BLOOD GASES W/O2 SATURATION: CPT | Performed by: PHYSICIAN ASSISTANT

## 2025-03-07 PROCEDURE — 027034Z DILATION OF CORONARY ARTERY, ONE ARTERY WITH DRUG-ELUTING INTRALUMINAL DEVICE, PERCUTANEOUS APPROACH: ICD-10-PCS | Performed by: INTERNAL MEDICINE

## 2025-03-07 PROCEDURE — 99152 MOD SED SAME PHYS/QHP 5/>YRS: CPT | Performed by: INTERNAL MEDICINE

## 2025-03-07 PROCEDURE — 93458 L HRT ARTERY/VENTRICLE ANGIO: CPT | Performed by: INTERNAL MEDICINE

## 2025-03-07 PROCEDURE — 80048 BASIC METABOLIC PNL TOTAL CA: CPT | Performed by: PHYSICIAN ASSISTANT

## 2025-03-07 PROCEDURE — 80048 BASIC METABOLIC PNL TOTAL CA: CPT

## 2025-03-07 PROCEDURE — 82805 BLOOD GASES W/O2 SATURATION: CPT

## 2025-03-07 PROCEDURE — 02HA3RJ INSERTION OF SHORT-TERM EXTERNAL HEART ASSIST SYSTEM INTO HEART, INTRAOPERATIVE, PERCUTANEOUS APPROACH: ICD-10-PCS | Performed by: INTERNAL MEDICINE

## 2025-03-07 PROCEDURE — C1753 CATH, INTRAVAS ULTRASOUND: HCPCS | Performed by: INTERNAL MEDICINE

## 2025-03-07 PROCEDURE — C1874 STENT, COATED/COV W/DEL SYS: HCPCS | Performed by: INTERNAL MEDICINE

## 2025-03-07 PROCEDURE — C1769 GUIDE WIRE: HCPCS | Performed by: INTERNAL MEDICINE

## 2025-03-07 PROCEDURE — C1725 CATH, TRANSLUMIN NON-LASER: HCPCS | Performed by: INTERNAL MEDICINE

## 2025-03-07 PROCEDURE — 99153 MOD SED SAME PHYS/QHP EA: CPT | Performed by: INTERNAL MEDICINE

## 2025-03-07 PROCEDURE — 99233 SBSQ HOSP IP/OBS HIGH 50: CPT | Performed by: INTERNAL MEDICINE

## 2025-03-07 PROCEDURE — 92978 ENDOLUMINL IVUS OCT C 1ST: CPT | Performed by: INTERNAL MEDICINE

## 2025-03-07 PROCEDURE — C9600 PERC DRUG-EL COR STENT SING: HCPCS | Performed by: INTERNAL MEDICINE

## 2025-03-07 PROCEDURE — 4A023N7 MEASUREMENT OF CARDIAC SAMPLING AND PRESSURE, LEFT HEART, PERCUTANEOUS APPROACH: ICD-10-PCS | Performed by: INTERNAL MEDICINE

## 2025-03-07 PROCEDURE — 93010 ELECTROCARDIOGRAM REPORT: CPT | Performed by: INTERNAL MEDICINE

## 2025-03-07 PROCEDURE — 85027 COMPLETE CBC AUTOMATED: CPT | Performed by: PHYSICIAN ASSISTANT

## 2025-03-07 PROCEDURE — 80076 HEPATIC FUNCTION PANEL: CPT | Performed by: PHYSICIAN ASSISTANT

## 2025-03-07 PROCEDURE — 33990 INSJ PERQ VAD L HRT ARTERIAL: CPT | Performed by: INTERNAL MEDICINE

## 2025-03-07 PROCEDURE — 85347 COAGULATION TIME ACTIVATED: CPT

## 2025-03-07 PROCEDURE — C1887 CATHETER, GUIDING: HCPCS | Performed by: INTERNAL MEDICINE

## 2025-03-07 PROCEDURE — 99233 SBSQ HOSP IP/OBS HIGH 50: CPT | Performed by: STUDENT IN AN ORGANIZED HEALTH CARE EDUCATION/TRAINING PROGRAM

## 2025-03-07 PROCEDURE — 93005 ELECTROCARDIOGRAM TRACING: CPT

## 2025-03-07 PROCEDURE — C1760 CLOSURE DEV, VASC: HCPCS | Performed by: INTERNAL MEDICINE

## 2025-03-07 PROCEDURE — 5A0221D ASSISTANCE WITH CARDIAC OUTPUT USING IMPELLER PUMP, CONTINUOUS: ICD-10-PCS | Performed by: INTERNAL MEDICINE

## 2025-03-07 PROCEDURE — 82330 ASSAY OF CALCIUM: CPT | Performed by: PHYSICIAN ASSISTANT

## 2025-03-07 PROCEDURE — C1894 INTRO/SHEATH, NON-LASER: HCPCS | Performed by: INTERNAL MEDICINE

## 2025-03-07 PROCEDURE — 85730 THROMBOPLASTIN TIME PARTIAL: CPT | Performed by: STUDENT IN AN ORGANIZED HEALTH CARE EDUCATION/TRAINING PROGRAM

## 2025-03-07 PROCEDURE — 84100 ASSAY OF PHOSPHORUS: CPT | Performed by: PHYSICIAN ASSISTANT

## 2025-03-07 PROCEDURE — 83735 ASSAY OF MAGNESIUM: CPT | Performed by: PHYSICIAN ASSISTANT

## 2025-03-07 PROCEDURE — 92928 PRQ TCAT PLMT NTRAC ST 1 LES: CPT | Performed by: INTERNAL MEDICINE

## 2025-03-07 DEVICE — STENT ONYXNG40008UX ONYX 4.00X08RX
Type: IMPLANTABLE DEVICE | Site: GROIN | Status: FUNCTIONAL
Brand: ONYX FRONTIER™

## 2025-03-07 RX ORDER — SODIUM CHLORIDE 9 MG/ML
50 INJECTION, SOLUTION INTRAVENOUS CONTINUOUS
Status: DISCONTINUED | OUTPATIENT
Start: 2025-03-07 | End: 2025-03-07

## 2025-03-07 RX ORDER — LIDOCAINE HYDROCHLORIDE 10 MG/ML
INJECTION, SOLUTION EPIDURAL; INFILTRATION; INTRACAUDAL; PERINEURAL CODE/TRAUMA/SEDATION MEDICATION
Status: DISCONTINUED | OUTPATIENT
Start: 2025-03-07 | End: 2025-03-07 | Stop reason: HOSPADM

## 2025-03-07 RX ORDER — POLYETHYLENE GLYCOL 3350 17 G/17G
17 POWDER, FOR SOLUTION ORAL DAILY PRN
Status: DISCONTINUED | OUTPATIENT
Start: 2025-03-07 | End: 2025-03-09

## 2025-03-07 RX ORDER — BISACODYL 10 MG
10 SUPPOSITORY, RECTAL RECTAL DAILY PRN
Status: DISCONTINUED | OUTPATIENT
Start: 2025-03-07 | End: 2025-03-11 | Stop reason: HOSPADM

## 2025-03-07 RX ORDER — MAGNESIUM SULFATE HEPTAHYDRATE 40 MG/ML
2 INJECTION, SOLUTION INTRAVENOUS ONCE
Status: COMPLETED | OUTPATIENT
Start: 2025-03-07 | End: 2025-03-07

## 2025-03-07 RX ORDER — HEPARIN SODIUM 1000 [USP'U]/ML
INJECTION, SOLUTION INTRAVENOUS; SUBCUTANEOUS CODE/TRAUMA/SEDATION MEDICATION
Status: DISCONTINUED | OUTPATIENT
Start: 2025-03-07 | End: 2025-03-07 | Stop reason: HOSPADM

## 2025-03-07 RX ORDER — FENTANYL CITRATE 50 UG/ML
INJECTION, SOLUTION INTRAMUSCULAR; INTRAVENOUS CODE/TRAUMA/SEDATION MEDICATION
Status: DISCONTINUED | OUTPATIENT
Start: 2025-03-07 | End: 2025-03-07 | Stop reason: HOSPADM

## 2025-03-07 RX ORDER — MIDAZOLAM HYDROCHLORIDE 2 MG/2ML
INJECTION, SOLUTION INTRAMUSCULAR; INTRAVENOUS CODE/TRAUMA/SEDATION MEDICATION
Status: DISCONTINUED | OUTPATIENT
Start: 2025-03-07 | End: 2025-03-07 | Stop reason: HOSPADM

## 2025-03-07 RX ORDER — POTASSIUM CHLORIDE 29.8 MG/ML
40 INJECTION INTRAVENOUS ONCE
Status: COMPLETED | OUTPATIENT
Start: 2025-03-07 | End: 2025-03-07

## 2025-03-07 RX ORDER — CALCIUM GLUCONATE 20 MG/ML
2 INJECTION, SOLUTION INTRAVENOUS ONCE
Status: COMPLETED | OUTPATIENT
Start: 2025-03-07 | End: 2025-03-07

## 2025-03-07 RX ADMIN — POTASSIUM CHLORIDE 40 MEQ: 29.8 INJECTION, SOLUTION INTRAVENOUS at 06:10

## 2025-03-07 RX ADMIN — PREDNISONE 5 MG: 10 TABLET ORAL at 19:37

## 2025-03-07 RX ADMIN — HYDROMORPHONE HYDROCHLORIDE 0.2 MG: 0.2 INJECTION, SOLUTION INTRAMUSCULAR; INTRAVENOUS; SUBCUTANEOUS at 21:31

## 2025-03-07 RX ADMIN — CHLORHEXIDINE GLUCONATE 15 ML: 1.2 SOLUTION ORAL at 20:04

## 2025-03-07 RX ADMIN — TICAGRELOR 90 MG: 90 TABLET ORAL at 20:04

## 2025-03-07 RX ADMIN — MAGNESIUM SULFATE HEPTAHYDRATE 2 G: 40 INJECTION, SOLUTION INTRAVENOUS at 06:10

## 2025-03-07 RX ADMIN — HYDROMORPHONE HYDROCHLORIDE 0.2 MG: 0.2 INJECTION, SOLUTION INTRAMUSCULAR; INTRAVENOUS; SUBCUTANEOUS at 16:02

## 2025-03-07 RX ADMIN — BUMETANIDE 2 MG: 0.25 INJECTION INTRAMUSCULAR; INTRAVENOUS at 20:04

## 2025-03-07 RX ADMIN — TICAGRELOR 90 MG: 90 TABLET ORAL at 08:02

## 2025-03-07 RX ADMIN — PREDNISONE 10 MG: 10 TABLET ORAL at 06:10

## 2025-03-07 RX ADMIN — MILRINONE LACTATE IN DEXTROSE 0.13 MCG/KG/MIN: 200 INJECTION, SOLUTION INTRAVENOUS at 22:04

## 2025-03-07 RX ADMIN — MILRINONE LACTATE IN DEXTROSE 0.25 MCG/KG/MIN: 200 INJECTION, SOLUTION INTRAVENOUS at 04:37

## 2025-03-07 RX ADMIN — HYDROMORPHONE HYDROCHLORIDE 0.2 MG: 0.2 INJECTION, SOLUTION INTRAMUSCULAR; INTRAVENOUS; SUBCUTANEOUS at 08:15

## 2025-03-07 RX ADMIN — HYDROMORPHONE HYDROCHLORIDE 0.2 MG: 0.2 INJECTION, SOLUTION INTRAMUSCULAR; INTRAVENOUS; SUBCUTANEOUS at 04:37

## 2025-03-07 RX ADMIN — SODIUM CHLORIDE 50 ML/HR: 0.9 INJECTION, SOLUTION INTRAVENOUS at 14:30

## 2025-03-07 RX ADMIN — BUMETANIDE 2 MG: 0.25 INJECTION INTRAMUSCULAR; INTRAVENOUS at 08:02

## 2025-03-07 RX ADMIN — ATORVASTATIN CALCIUM 80 MG: 80 TABLET, FILM COATED ORAL at 15:36

## 2025-03-07 RX ADMIN — CHLORHEXIDINE GLUCONATE 15 ML: 1.2 SOLUTION ORAL at 08:02

## 2025-03-07 RX ADMIN — SENNOSIDES AND DOCUSATE SODIUM 1 TABLET: 50; 8.6 TABLET ORAL at 08:02

## 2025-03-07 RX ADMIN — ASPIRIN 81 MG: 81 TABLET, CHEWABLE ORAL at 08:02

## 2025-03-07 RX ADMIN — HEPARIN SODIUM 12 UNITS/KG/HR: 10000 INJECTION, SOLUTION INTRAVENOUS at 09:21

## 2025-03-07 RX ADMIN — SENNOSIDES AND DOCUSATE SODIUM 1 TABLET: 50; 8.6 TABLET ORAL at 18:07

## 2025-03-07 RX ADMIN — BUMETANIDE 2 MG: 0.25 INJECTION INTRAMUSCULAR; INTRAVENOUS at 15:36

## 2025-03-07 RX ADMIN — CALCIUM GLUCONATE 2 G: 20 INJECTION, SOLUTION INTRAVENOUS at 07:32

## 2025-03-07 NOTE — ASSESSMENT & PLAN NOTE
Presented to [facility] with chest pain, diaphoresis, nausea  EKG without obvious acute ischemic changes   STAT TTE from 3/2:  Left Ventricle: Left ventricular cavity size is dilated with LVEDD of 6.5 cm. 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 No apical thrombus visualized. The left ventricular ejection fraction is 15-20%. Systolic function is severely reduced. There is severe global hypokinesis with apical aneurysmal dilatation and akinesis of the mid to apical anterior region. There is thinning and akinesia in the basal to mid inferior consistent with transmural myocardial infarction. Diastolic function is severely abnormal, consistent with Grade III restrictive diastolic dysfunction.  IVS: There is abnormal septal motion of unclear etiology.  Right Ventricle: Systolic function is moderately reduced.  Left Atrium: The atrium is severely dilated.  Aortic Valve: The aortic valve is trileaflet. The leaflets are severely thickened. The leaflets are severely calcified. There is severely reduced mobility. Low flow, low gradient aortic stenosis cannot be excluded. There is moderate regurgitation.  Mitral Valve: There is moderate annular calcification. There is moderate to severe regurgitation with an eccentrically anteriorly directed jet.  Aorta: The aortic root is mildly dilated. The ascending aorta is normal in size.  Does have known hx of PCI to LAD 2014 but no known hx of heart failure but did used to take 80mg of IV lasix  Initial lactate 2.4, ScvO2 59, calculated Stefani CI of 1.5    Plan:  Discussed with cardiology and interventional cardiology  Taken to the cath lab where he was found to have LAD, LCx and RCA disease  No IABP placed due to moderate AI  Plan for Impella assisted PCI likely 3/7  Initially, on levophed for BP support but weaned to off once milrinone started  Milrinone 0.25  q12hr VBG and BMP/Mg  Diuresis with Bumex 2mg TID  Heart failure consultation   Strict I&O's

## 2025-03-07 NOTE — PROGRESS NOTES
Progress Note - Heart Failure   Name: Rodney Pizarro 60 y.o. male I MRN: [id number]  Unit/Bed#: PPHP-312-01 I Date of Admission: 3/2/2025   Date of Service: 3/7/2025 I Hospital Day: 5    Assessment & Plan  Cardiogenic shock (HCC)  In the setting of ischemic cardiomyopathy and decompensated heart failure  SCAI stage A/B  Currently on milrinone 0.25, SVO 2 of 65.2, hemoglobin of 14.2  Ficks CO: 3.7, Cardiac Index: 2  Warm and euvolemic  MAP of 80  LFTs improving, kidney function stable  I/O: Out 2.5L, net -800 cc,  No significant events noted on telemetry, on amiodarone drip  No further fevers    Plan:  Overall volume status is improving, and doing well on 0.25 of milrinone, continue  Planning for Impella assisted PCI today  Continue Bumex 2 mg 3 times daily, aim for net -500 cc  Can continue amiodarone  Electrolytes were repleted  Severe mitral regurgitation  Likely functional in the setting of dilated cardiomyopathy  Moderate aortic insufficiency  Also noted to have moderate AI and low-flow low gradient AS  Coronary artery disease involving native coronary artery of native heart with unstable angina pectoris (HCC)  Has prior history of MI in 2011 and 2014 s/p PCI to LAD and RCA  Presented with chest pain diaphoresis and elevated troponin  EKG did not reveal ST elevations, did have inferior Q waves and right bundle branch block    Left heart cath revealed 100% proximal RCA lesion, 100% LAD lesion and 90% ostial left circumflex lesion.     Has been loaded with aspirin and Brilinta  Currently on heparin drip  Planning for optimization of heart failure and shock, will with eventual plan of Impella assisted PCI of the left circumflex    Unstable angina (HCC)    Wegener's granulomatosis with renal involvement (HCC)    Acute kidney injury (HCC)    Palliative care encounter      24 Hour Events : No significant overnight events      Objective :  Temp:  [99 °F (37.2 °C)-99.9 °F (37.7 °C)] 99.1 °F (37.3 °C)  HR:  []  109  BP: ()/(63-82) 106/81  Resp:  [20-45] 28  SpO2:  [96 %-100 %] 98 %  O2 Device: Nasal cannula  Nasal Cannula O2 Flow Rate (L/min):  [1.5 L/min] 1.5 L/min    Physical Exam  Constitutional:       Appearance: Normal appearance. He is not ill-appearing.   Cardiovascular:      Rate and Rhythm: Normal rate and regular rhythm.      Heart sounds: No murmur heard.  Pulmonary:      Effort: Pulmonary effort is normal.      Breath sounds: No rales.   Musculoskeletal:      Right lower leg: No edema.      Left lower leg: No edema.   Skin:     General: Skin is warm.   Neurological:      General: No focal deficit present.      Mental Status: He is alert and oriented to person, place, and time.         Lab Results: I have reviewed the following results:CBC/BMP:   .     03/07/25  0443   WBC 8.72   HGB 14.1   HCT 43.5      SODIUM 131*   K 3.7   CL 89*   CO2 34*   BUN 42*   CREATININE 1.98*   GLUC 118   CAIONIZED 1.06*   MG 1.8*   PHOS 4.4    , Creatinine Clearance: Estimated Creatinine Clearance: 37.6 mL/min (A) (by C-G formula based on SCr of 1.98 mg/dL (H)).    Imaging Results Review: No pertinent imaging studies reviewed.      VTE Pharmacologic Prophylaxis: VTE covered by:  heparin (porcine), Intravenous, 12 Units/kg/hr at 03/06/25 0505  heparin (porcine), Intravenous  heparin (porcine), Intravenous     VTE Mechanical Prophylaxis: sequential compression device

## 2025-03-07 NOTE — PLAN OF CARE
Problem: CARDIOVASCULAR - ADULT  Goal: Absence of cardiac dysrhythmias or at baseline rhythm  Description: INTERVENTIONS:  - Continuous cardiac monitoring, vital signs, obtain 12 lead EKG if ordered  - Administer antiarrhythmic and heart rate control medications as ordered  - Monitor electrolytes and administer replacement therapy as ordered  Outcome: Progressing     Problem: Prexisting or High Potential for Compromised Skin Integrity  Goal: Skin integrity is maintained or improved  Description: INTERVENTIONS:  - Identify patients at risk for skin breakdown  - Assess and monitor skin integrity  - Assess and monitor nutrition and hydration status  - Monitor labs   - Assess for incontinence   - Turn and reposition patient  - Assist with mobility/ambulation  - Relieve pressure over bony prominences  - Avoid friction and shearing  - Provide appropriate hygiene as needed including keeping skin clean and dry  - Evaluate need for skin moisturizer/barrier cream  - Collaborate with interdisciplinary team   - Patient/family teaching  - Consider wound care consult   Outcome: Progressing     Problem: PAIN - ADULT  Goal: Verbalizes/displays adequate comfort level or baseline comfort level  Description: Interventions:  - Encourage patient to monitor pain and request assistance  - Assess pain using appropriate pain scale  - Administer analgesics based on type and severity of pain and evaluate response  - Implement non-pharmacological measures as appropriate and evaluate response  - Consider cultural and social influences on pain and pain management  - Notify physician/advanced practitioner if interventions unsuccessful or patient reports new pain  Outcome: Progressing     Problem: SAFETY ADULT  Goal: Patient will remain free of falls  Description: INTERVENTIONS:  - Educate patient/family on patient safety including physical limitations  - Instruct patient to call for assistance with activity   - Consult OT/PT to assist with  strengthening/mobility   - Keep Call bell within reach  - Keep bed low and locked with side rails adjusted as appropriate  - Keep care items and personal belongings within reach  - Initiate and maintain comfort rounds  - Make Fall Risk Sign visible to staff  - Offer Toileting every 2 Hours, in advance of need  - Initiate/Maintain bed alarm  - Obtain necessary fall risk management equipment:   - Apply yellow socks and bracelet for high fall risk patients  - Consider moving patient to room near nurses station  Outcome: Progressing  Goal: Maintain or return to baseline ADL function  Description: INTERVENTIONS:  -  Assess patient's ability to carry out ADLs; assess patient's baseline for ADL function and identify physical deficits which impact ability to perform ADLs (bathing, care of mouth/teeth, toileting, grooming, dressing, etc.)  - Assess/evaluate cause of self-care deficits   - Assess range of motion  - Assess patient's mobility; develop plan if impaired  - Assess patient's need for assistive devices and provide as appropriate  - Encourage maximum independence but intervene and supervise when necessary  - Involve family in performance of ADLs  - Assess for home care needs following discharge   - Consider OT consult to assist with ADL evaluation and planning for discharge  - Provide patient education as appropriate  Outcome: Progressing  Goal: Maintains/Returns to pre admission functional level  Description: INTERVENTIONS:  - Perform AM-PAC 6 Click Basic Mobility/ Daily Activity assessment daily.  - Set and communicate daily mobility goal to care team and patient/family/caregiver.   - Collaborate with rehabilitation services on mobility goals if consulted  - Perform Range of Motion 3 times a day.  - Reposition patient every 2 hours.  - Dangle patient 3 times a day  - Stand patient 3 times a day  - Ambulate patient 2 times a day  - Out of bed to chair 3 times a day   - Out of bed for meals 3 times a day  - Out of  bed for toileting  - Record patient progress and toleration of activity level   Outcome: Progressing

## 2025-03-07 NOTE — ASSESSMENT & PLAN NOTE
In the setting of ischemic cardiomyopathy and decompensated heart failure  SCAI stage A/B  Currently on milrinone 0.25, SVO 2 of 65.2, hemoglobin of 14.2  Ficks CO: 3.7, Cardiac Index: 2  Warm and euvolemic  MAP of 80  LFTs improving, kidney function stable  I/O: Out 2.5L, net -800 cc,  No significant events noted on telemetry, on amiodarone drip  No further fevers    Plan:  Overall volume status is improving, and doing well on 0.25 of milrinone, continue  Planning for Impella assisted PCI today  Continue Bumex 2 mg 3 times daily, aim for net -500 cc  Can continue amiodarone  Electrolytes were repleted

## 2025-03-07 NOTE — PROGRESS NOTES
Progress Note - Critical Care/ICU   Name: Rodney Pizarro 60 y.o. male I MRN: [id number]  Unit/Bed#: PPHP-312-01 I Date of Admission: 3/2/2025   Date of Service: 3/7/2025 I Hospital Day: 5      Assessment & Plan  Cardiogenic shock (HCC)  Presented to [facility] with chest pain, diaphoresis, nausea  EKG without obvious acute ischemic changes   STAT TTE from 3/2:  Left Ventricle: Left ventricular cavity size is dilated with LVEDD of 6.5 cm. 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 No apical thrombus visualized. The left ventricular ejection fraction is 15-20%. Systolic function is severely reduced. There is severe global hypokinesis with apical aneurysmal dilatation and akinesis of the mid to apical anterior region. There is thinning and akinesia in the basal to mid inferior consistent with transmural myocardial infarction. Diastolic function is severely abnormal, consistent with Grade III restrictive diastolic dysfunction.  IVS: There is abnormal septal motion of unclear etiology.  Right Ventricle: Systolic function is moderately reduced.  Left Atrium: The atrium is severely dilated.  Aortic Valve: The aortic valve is trileaflet. The leaflets are severely thickened. The leaflets are severely calcified. There is severely reduced mobility. Low flow, low gradient aortic stenosis cannot be excluded. There is moderate regurgitation.  Mitral Valve: There is moderate annular calcification. There is moderate to severe regurgitation with an eccentrically anteriorly directed jet.  Aorta: The aortic root is mildly dilated. The ascending aorta is normal in size.  Does have known hx of PCI to LAD 2014 but no known hx of heart failure but did used to take 80mg of IV lasix  Initial lactate 2.4, ScvO2 59, calculated Stefani CI of 1.5    Plan:  Discussed with cardiology and interventional cardiology  Taken to the cath lab where he was found to have LAD, LCx and RCA disease  No IABP placed due to moderate AI  Plan for Impella assisted PCI likely  3/7  Initially, on levophed for BP support but weaned to off once milrinone started  Milrinone 0.25  q12hr VBG and BMP/Mg  Diuresis with Bumex 2mg TID  Heart failure consultation   Strict I&O's  Unstable angina (HCC)  Ongoing chest pain 8/10 crushing/burning chest pain on arrival to [facility]  SBP prohibitive of initiating nitro gtt  Dilaudid PRN for pain control   Overall improved   Coronary artery disease involving native coronary artery of native heart with unstable angina pectoris (HCC)  Previous hx of stents to LAD per wife - stopped taking medications 4-5 years ago  3/2 cardiac cath:   100 % proximal LAD occlusion at the site of previous stenting . There is also a 70% ostail LAD lesion.  Large L Cx artery with a 90 % ostial lesion.  100 % RCA occlusion proximally.  Plan for Impella assisted PCI 3/7  ASA. Statin. Heparin infusion.  CTS consult - no surgical intervention planned   Wegener's granulomatosis with renal involvement (HCC)  On daily prednisone per pt - continue as ordered  10mg Qam  5mg QHS  Acute kidney injury (HCC)  Per family, no hx of CKD  Initial Cr 1.6 on arrival  Strict I&O's  q12 BMP's  Avoid nephrotoxic agents  Severe mitral regurgitation  Volume removal and medical management   Moderate aortic insufficiency  Volume removal and medical management   Palliative care encounter    Disposition: Critical care    ICU Core Measures     A: Assess, Prevent, and Manage Pain Has pain been assessed? Yes  Need for changes to pain regimen? No   B: Both SAT/SAT  N/A   C: Choice of Sedation RASS Goal: N/A patient not on sedation  Need for changes to sedation or analgesia regimen? NA   D: Delirium CAM-ICU: Negative   E: Early Mobility  Plan for early mobility? Yes   F: Family Engagement Plan for family engagement today? Yes       Review of Invasive Devices:    Covarrubias Plan: Continue for accurate I/O monitoring for 48 hours  Central access plan: Medications requiring central line      Prophylaxis:  VTE VTE covered  by:  heparin (porcine), Intravenous, 12 Units/kg/hr at 03/06/25 0507  heparin (porcine), Intravenous  heparin (porcine), Intravenous       Stress Ulcer  not ordered         24 Hour Events : no acute events overnight, admits to feeling nervous regarding upcoming PCI.  Subjective       Objective :                   Vitals I/O      Most Recent Min/Max in 24hrs   Temp 99.1 °F (37.3 °C) Temp  Min: 99 °F (37.2 °C)  Max: 99.9 °F (37.7 °C)   Pulse 97 Pulse  Min: 96  Max: 113   Resp 22 Resp  Min: 20  Max: 45   BP 92/64 BP  Min: 80/51  Max: 107/77   O2 Sat 98 % SpO2  Min: 96 %  Max: 99 %      Intake/Output Summary (Last 24 hours) at 3/7/2025 0440  Last data filed at 3/7/2025 0200  Gross per 24 hour   Intake 1607.4 ml   Output 2370 ml   Net -762.6 ml       Diet NPO    Invasive Monitoring           Physical Exam   Physical Exam  Eyes:      General: No scleral icterus.     Extraocular Movements: Extraocular movements intact.      Conjunctiva/sclera: Conjunctivae normal.   Skin:     General: Skin is warm.      Capillary Refill: Capillary refill takes less than 2 seconds.   HENT:      Head: Normocephalic and atraumatic.   Cardiovascular:      Rate and Rhythm: Regular rhythm. Tachycardia present.   Musculoskeletal:      Right lower leg: No edema.      Left lower leg: No edema.   Abdominal: General: There is no distension.      Palpations: Abdomen is soft.      Tenderness: There is no abdominal tenderness. There is no guarding.   Constitutional:       General: He is not in acute distress.     Appearance: He is well-developed and well-nourished.   Pulmonary:      Effort: Pulmonary effort is normal. No accessory muscle usage, respiratory distress or accessory muscle usage.      Breath sounds: No wheezing.   Neurological:      General: No focal deficit present.      Mental Status: He is alert and oriented to person, place and time.      Motor: Strength full and intact in all extremities.          Diagnostic Studies        Lab Results:  I have reviewed the following results:     Medications:  Scheduled PRN   aspirin, 81 mg, Daily  atorvastatin, 80 mg, Daily With Dinner  bumetanide, 2 mg, TID  chlorhexidine, 15 mL, Q12H ANDRESSA  predniSONE, 10 mg, Early Morning  predniSONE, 5 mg, HS  senna-docusate sodium, 1 tablet, BID  ticagrelor, 90 mg, Q12H ANDRESSA      acetaminophen, 650 mg, Q6H PRN  bisacodyl, 10 mg, Daily PRN  heparin (porcine), 2,000 Units, Q6H PRN  heparin (porcine), 4,000 Units, Q6H PRN  HYDROmorphone, 0.2 mg, Q2H PRN   Or  HYDROmorphone, 0.5 mg, Q2H PRN  ondansetron, 4 mg, Q6H PRN  polyethylene glycol, 17 g, Daily PRN  trimethobenzamide, 200 mg, Q6H PRN       Continuous    amiodarone (CORDARONE) 900 mg in dextrose 5 % 500 mL infusion, 0.5 mg/min, Last Rate: 0.5 mg/min (03/06/25 1720)  heparin (porcine), 3-20 Units/kg/hr (Order-Specific), Last Rate: 12 Units/kg/hr (03/06/25 0507)  milrinone (Primacor) infusion, 0.25 mcg/kg/min, Last Rate: 0.25 mcg/kg/min (03/07/25 0437)         Labs:   CBC    Recent Labs     03/06/25  0432   WBC 10.19*   HGB 14.2   HCT 43.7        BMP    Recent Labs     03/06/25  0432 03/06/25  1604   SODIUM 133* 132*   K 3.6 4.0   CL 91* 91*   CO2 33* 31   AGAP 9 10   BUN 38* 45*   CREATININE 1.89* 1.95*   CALCIUM 8.3* 9.0       Coags    Recent Labs     03/06/25  0432   PTT 83*        Additional Electrolytes  Recent Labs     03/06/25  0432   MG 2.0   PHOS 4.3   CAIONIZED 1.03*          Blood Gas    No recent results  Recent Labs     03/06/25  1604   PHVEN 7.412*   [id number] 47.0   PO2VEN 31.9*   DLD1XQL 29.3   BEVEN 3.8   [id number] 56.0*    LFTs  Recent Labs     03/06/25  0432   ALT 72*   *   ALKPHOS 46   ALB 3.4*   TBILI 1.80*       Infectious  No recent results  Glucose  Recent Labs     03/05/25  1611 03/06/25  0432 03/06/25  1604   GLUC 122 127 115

## 2025-03-07 NOTE — ASSESSMENT & PLAN NOTE
Per family, no hx of CKD  Initial Cr 1.6 on arrival  Strict I&O's  q12 BMP's  Avoid nephrotoxic agents

## 2025-03-07 NOTE — ASSESSMENT & PLAN NOTE
Previous hx of stents to LAD per wife - stopped taking medications 4-5 years ago  3/2 cardiac cath:   100 % proximal LAD occlusion at the site of previous stenting . There is also a 70% ostail LAD lesion.  Large L Cx artery with a 90 % ostial lesion.  100 % RCA occlusion proximally.  Plan for Impella assisted PCI 3/7  ASA. Statin. Heparin infusion.  CTS consult - no surgical intervention planned

## 2025-03-07 NOTE — OCCUPATIONAL THERAPY NOTE
Occupational Therapy Cancel Note        Patient Name: Rodney Pizarro  Today's Date: 3/7/2025           03/07/25 1323   OT Last Visit   OT Visit Date 03/07/25   Note Type   Note Type Cancelled Session   Cancel Reasons Patient to operating room       Chart reviewed, pt currently off the floor at Cath Lab with plan for impella-assisted PCI today. Will hold and continue to follow as medically appropriate.      Ludmila Paige, SHAHIDA, OTR/L

## 2025-03-08 PROBLEM — I25.5 ISCHEMIC DILATED CARDIOMYOPATHY DUE TO CORONARY ARTERY DISEASE: Status: ACTIVE | Noted: 2025-03-02

## 2025-03-08 PROBLEM — I25.10 ISCHEMIC DILATED CARDIOMYOPATHY DUE TO CORONARY ARTERY DISEASE: Status: ACTIVE | Noted: 2025-03-02

## 2025-03-08 LAB
ANION GAP SERPL CALCULATED.3IONS-SCNC: 9 MMOL/L (ref 4–13)
BASE EX.OXY STD BLDV CALC-SCNC: 45.2 % (ref 60–80)
BASE EX.OXY STD BLDV CALC-SCNC: 46.3 % (ref 60–80)
BASE EX.OXY STD BLDV CALC-SCNC: 52.3 % (ref 60–80)
BASE EXCESS BLDV CALC-SCNC: 6 MMOL/L
BASE EXCESS BLDV CALC-SCNC: 6.1 MMOL/L
BASE EXCESS BLDV CALC-SCNC: 7.1 MMOL/L
BODY TEMPERATURE: 99 DEGREES FEHRENHEIT
BODY TEMPERATURE: 99.5 DEGREES FEHRENHEIT
BUN SERPL-MCNC: 41 MG/DL (ref 5–25)
CALCIUM SERPL-MCNC: 8.6 MG/DL (ref 8.4–10.2)
CHLORIDE SERPL-SCNC: 89 MMOL/L (ref 96–108)
CO2 SERPL-SCNC: 33 MMOL/L (ref 21–32)
CREAT SERPL-MCNC: 1.87 MG/DL (ref 0.6–1.3)
ERYTHROCYTE [DISTWIDTH] IN BLOOD BY AUTOMATED COUNT: 13.1 % (ref 11.6–15.1)
GFR SERPL CREATININE-BSD FRML MDRD: 38 ML/MIN/1.73SQ M
GLUCOSE SERPL-MCNC: 106 MG/DL (ref 65–140)
HCO3 BLDV-SCNC: 31.3 MMOL/L (ref 24–30)
HCO3 BLDV-SCNC: 31.3 MMOL/L (ref 24–30)
HCO3 BLDV-SCNC: 32.7 MMOL/L (ref 24–30)
HCT VFR BLD AUTO: 42.7 % (ref 36.5–49.3)
HGB BLD-MCNC: 14 G/DL (ref 12–17)
MAGNESIUM SERPL-MCNC: 2 MG/DL (ref 1.9–2.7)
MCH RBC QN AUTO: 29.7 PG (ref 26.8–34.3)
MCHC RBC AUTO-ENTMCNC: 32.8 G/DL (ref 31.4–37.4)
MCV RBC AUTO: 91 FL (ref 82–98)
NON VENT ROOM AIR: ABNORMAL %
O2 CT BLDV-SCNC: 10.1 ML/DL
O2 CT BLDV-SCNC: 10.9 ML/DL
O2 CT BLDV-SCNC: 9.6 ML/DL
PCO2 BLD: 49.7 MM HG (ref 42–50)
PCO2 BLDV: 47 MM HG (ref 42–50)
PCO2 BLDV: 47.4 MM HG (ref 42–50)
PCO2 BLDV: 49.3 MM HG (ref 42–50)
PH BLD: 7.44 [PH] (ref 7.3–7.4)
PH BLDV: 7.44 [PH] (ref 7.3–7.4)
PLATELET # BLD AUTO: 270 THOUSANDS/UL (ref 149–390)
PMV BLD AUTO: 11.1 FL (ref 8.9–12.7)
PO2 BLDV: 26.9 MM HG (ref 35–45)
PO2 BLDV: 27.4 MM HG (ref 35–45)
PO2 BLDV: 30.4 MM HG (ref 35–45)
PO2 VENOUS TEMP CORRECTED: 27.8 MM HG (ref 35–45)
POTASSIUM SERPL-SCNC: 3.6 MMOL/L (ref 3.5–5.3)
RBC # BLD AUTO: 4.72 MILLION/UL (ref 3.88–5.62)
SODIUM SERPL-SCNC: 131 MMOL/L (ref 135–147)
WBC # BLD AUTO: 8.59 THOUSAND/UL (ref 4.31–10.16)

## 2025-03-08 PROCEDURE — 99233 SBSQ HOSP IP/OBS HIGH 50: CPT | Performed by: INTERNAL MEDICINE

## 2025-03-08 PROCEDURE — 85027 COMPLETE CBC AUTOMATED: CPT | Performed by: STUDENT IN AN ORGANIZED HEALTH CARE EDUCATION/TRAINING PROGRAM

## 2025-03-08 PROCEDURE — 83735 ASSAY OF MAGNESIUM: CPT | Performed by: STUDENT IN AN ORGANIZED HEALTH CARE EDUCATION/TRAINING PROGRAM

## 2025-03-08 PROCEDURE — 82805 BLOOD GASES W/O2 SATURATION: CPT | Performed by: PHYSICIAN ASSISTANT

## 2025-03-08 PROCEDURE — 80048 BASIC METABOLIC PNL TOTAL CA: CPT | Performed by: STUDENT IN AN ORGANIZED HEALTH CARE EDUCATION/TRAINING PROGRAM

## 2025-03-08 PROCEDURE — 99233 SBSQ HOSP IP/OBS HIGH 50: CPT | Performed by: STUDENT IN AN ORGANIZED HEALTH CARE EDUCATION/TRAINING PROGRAM

## 2025-03-08 RX ORDER — POTASSIUM CHLORIDE 1500 MG/1
40 TABLET, EXTENDED RELEASE ORAL ONCE
Status: COMPLETED | OUTPATIENT
Start: 2025-03-08 | End: 2025-03-08

## 2025-03-08 RX ORDER — OXYMETAZOLINE HYDROCHLORIDE 0.05 G/100ML
2 SPRAY NASAL EVERY 12 HOURS PRN
Status: DISPENSED | OUTPATIENT
Start: 2025-03-08 | End: 2025-03-11

## 2025-03-08 RX ORDER — HEPARIN SODIUM 5000 [USP'U]/ML
5000 INJECTION, SOLUTION INTRAVENOUS; SUBCUTANEOUS EVERY 8 HOURS SCHEDULED
Status: DISCONTINUED | OUTPATIENT
Start: 2025-03-08 | End: 2025-03-11 | Stop reason: HOSPADM

## 2025-03-08 RX ORDER — HYDROMORPHONE HCL IN WATER/PF 6 MG/30 ML
0.2 PATIENT CONTROLLED ANALGESIA SYRINGE INTRAVENOUS
Status: DISCONTINUED | OUTPATIENT
Start: 2025-03-08 | End: 2025-03-08

## 2025-03-08 RX ORDER — OXYCODONE HYDROCHLORIDE 5 MG/1
5 TABLET ORAL EVERY 4 HOURS PRN
Refills: 0 | Status: DISCONTINUED | OUTPATIENT
Start: 2025-03-08 | End: 2025-03-11 | Stop reason: HOSPADM

## 2025-03-08 RX ORDER — ISOSORBIDE DINITRATE 10 MG/1
10 TABLET ORAL
Status: DISCONTINUED | OUTPATIENT
Start: 2025-03-08 | End: 2025-03-11 | Stop reason: HOSPADM

## 2025-03-08 RX ADMIN — CHLORHEXIDINE GLUCONATE 15 ML: 1.2 SOLUTION ORAL at 20:42

## 2025-03-08 RX ADMIN — ASPIRIN 81 MG: 81 TABLET, CHEWABLE ORAL at 08:28

## 2025-03-08 RX ADMIN — HEPARIN SODIUM 5000 UNITS: 5000 INJECTION INTRAVENOUS; SUBCUTANEOUS at 21:45

## 2025-03-08 RX ADMIN — OXYCODONE HYDROCHLORIDE 5 MG: 5 TABLET ORAL at 17:29

## 2025-03-08 RX ADMIN — SENNOSIDES AND DOCUSATE SODIUM 1 TABLET: 50; 8.6 TABLET ORAL at 17:28

## 2025-03-08 RX ADMIN — CHLORHEXIDINE GLUCONATE 15 ML: 1.2 SOLUTION ORAL at 08:28

## 2025-03-08 RX ADMIN — PREDNISONE 5 MG: 10 TABLET ORAL at 20:40

## 2025-03-08 RX ADMIN — POTASSIUM CHLORIDE 40 MEQ: 1500 TABLET, EXTENDED RELEASE ORAL at 08:38

## 2025-03-08 RX ADMIN — BUMETANIDE 2 MG: 0.25 INJECTION INTRAMUSCULAR; INTRAVENOUS at 21:45

## 2025-03-08 RX ADMIN — HEPARIN SODIUM 5000 UNITS: 5000 INJECTION INTRAVENOUS; SUBCUTANEOUS at 05:44

## 2025-03-08 RX ADMIN — MILRINONE LACTATE IN DEXTROSE 0.13 MCG/KG/MIN: 200 INJECTION, SOLUTION INTRAVENOUS at 20:47

## 2025-03-08 RX ADMIN — HYDROMORPHONE HYDROCHLORIDE 0.2 MG: 0.2 INJECTION, SOLUTION INTRAMUSCULAR; INTRAVENOUS; SUBCUTANEOUS at 13:45

## 2025-03-08 RX ADMIN — ISOSORBIDE DINITRATE 10 MG: 10 TABLET ORAL at 11:54

## 2025-03-08 RX ADMIN — SENNOSIDES AND DOCUSATE SODIUM 1 TABLET: 50; 8.6 TABLET ORAL at 08:28

## 2025-03-08 RX ADMIN — HEPARIN SODIUM 5000 UNITS: 5000 INJECTION INTRAVENOUS; SUBCUTANEOUS at 13:44

## 2025-03-08 RX ADMIN — ISOSORBIDE DINITRATE 10 MG: 10 TABLET ORAL at 17:28

## 2025-03-08 RX ADMIN — HYDROMORPHONE HYDROCHLORIDE 0.2 MG: 0.2 INJECTION, SOLUTION INTRAMUSCULAR; INTRAVENOUS; SUBCUTANEOUS at 03:43

## 2025-03-08 RX ADMIN — POLYETHYLENE GLYCOL 3350 17 G: 17 POWDER, FOR SOLUTION ORAL at 08:36

## 2025-03-08 RX ADMIN — TICAGRELOR 90 MG: 90 TABLET ORAL at 08:28

## 2025-03-08 RX ADMIN — OXYMETAZOLINE HYDROCHLORIDE 2 SPRAY: 0.5 SPRAY NASAL at 08:41

## 2025-03-08 RX ADMIN — BUMETANIDE 2 MG: 0.25 INJECTION INTRAMUSCULAR; INTRAVENOUS at 08:28

## 2025-03-08 RX ADMIN — HYDROMORPHONE HYDROCHLORIDE 0.2 MG: 0.2 INJECTION, SOLUTION INTRAMUSCULAR; INTRAVENOUS; SUBCUTANEOUS at 08:37

## 2025-03-08 RX ADMIN — ATORVASTATIN CALCIUM 80 MG: 80 TABLET, FILM COATED ORAL at 17:28

## 2025-03-08 RX ADMIN — BUMETANIDE 2 MG: 0.25 INJECTION INTRAMUSCULAR; INTRAVENOUS at 17:28

## 2025-03-08 RX ADMIN — TICAGRELOR 90 MG: 90 TABLET ORAL at 20:40

## 2025-03-08 RX ADMIN — OXYCODONE HYDROCHLORIDE 5 MG: 5 TABLET ORAL at 21:43

## 2025-03-08 RX ADMIN — PREDNISONE 10 MG: 10 TABLET ORAL at 05:44

## 2025-03-08 NOTE — ASSESSMENT & PLAN NOTE
Multifactorial from cardiogenic shock, contrast exposures, possible underlying renal disease given history of Wegener's granulomatosis   Baseline creatinine: Unknown  Initial creatinine: 1.65 (3/2/25)   Last creatinine: 2.03 (3/7/25)     Plan:   Repeat BMP later today   Strict q2h I/O monitoring  Discontinue valencia catheter today   Continue to follow renal function tests  Nephrology consulted, appreciate recommendations

## 2025-03-08 NOTE — ASSESSMENT & PLAN NOTE
Prior MI in 2010 and 2015. KASSIDY to LAD-Diag. Self-discontinued medications 4-5 years ago   3/2/25 LHC: 100 % proximal LAD occlusion at the site of previous stenting. 70% ostail LAD lesion. Large L Cx artery with a 90 % ostial lesion. 100 % RCA occlusion proximally.   3/7/25 Impella-assisted LHC: PCI to ostial LCx. Noted recanalization of LAD.     Plan:  ASA 81mg daily   Brilinta 90mg q12h   Atorvastatin 80mg qHS   Continue to monitor EKG for new, acute ischemic changes  Patient previously seen by Cardiac Surgery. Not a surgical candidate

## 2025-03-08 NOTE — PROGRESS NOTES
Progress Note - Heart Failure   Name: Rodney Pizarro 60 y.o. male I MRN: [id number]  Unit/Bed#: PPHP-312-01 I Date of Admission: 3/2/2025   Date of Service: 3/8/2025 I Hospital Day: 6     Assessment & Plan  Cardiogenic shock (HCC)  In the setting of ischemic cardiomyopathy and decompensated heart failure  SCAI stage A/B  Currently on milrinone 0.25, SVO 2 of 52.3, hemoglobin of 14  Stefani: CO: 3.3, Cardiac Index: 1.8  Warm and euvolemic  MAP of 82  LFTs improved, kidney function stable  I/O last 24 hours: I 1.1L, O -2.7L, I/O -1.6, Net -10.1L  No significant events noted on telemetry  No further fevers    Plan:  S/p high risk PCI to ostial LCx with Impella support on 3/7.  Awaiting hopeful recovery following revascularization.  Overall volume status is improving, and doing well on 0.25 of milrinone, continue.    Continue Bumex 2 mg 3 times daily, aim for net -500 cc. May consider decreasing.   Amiodarone discontinued 3/7.  Monitor I's and O's and renal function.  Monitor electrolytes and replete as needed.  Telemetry monitoring.  Obtain repeat echo next week to evaluate for improvement.  Not currently a candidate for OHT/LVAD.  Not a surgical candidate.  Ischemic dilated cardiomyopathy (HFrEF 20%) due to coronary artery disease  See plan above.   Severe mitral regurgitation  Likely functional in the setting of dilated cardiomyopathy  Moderate aortic insufficiency  Also noted to have moderate AI and low-flow low gradient AS  Coronary artery disease involving native coronary artery of native heart with unstable angina pectoris (HCC)  Has prior history of MI in 2011 and 2014 s/p PCI to LAD and RCA  Presented with chest pain diaphoresis and elevated troponin  EKG did not reveal ST elevations, did have inferior Q waves and right bundle branch block    Left heart cath 3/2 revealed 100% proximal RCA lesion, 100% LAD lesion and 90% ostial left circumflex lesion.     S/p high risk PCI to ostial LCx with Impella support on 3/8.   Cath findings also showed 60% ostial LAD lesion and 40% left main lesion.    Continue aspirin and Brilinta  Continue Atorvastatin  Continue management of heart failure and shock as above.     Acute kidney injury (HCC)  Monitor BMP.  Wegener's granulomatosis with renal involvement (HCC)    Palliative care encounter      Subjective   The patient was seen and examined at bedside.  He had no events overnight.  Today he does complain of some mild chest discomfort that is not new.  Otherwise he had no other acute complaints.  Telemetry shows sinus tachycardia in the low 100s.    Objective :  Temp:  [98.4 °F (36.9 °C)-99.9 °F (37.7 °C)] 99.5 °F (37.5 °C)  HR:  [102-112] 110  BP: ()/(62-85) 104/77  Resp:  [16-38] 26  SpO2:  [90 %-100 %] 90 %  O2 Device: None (Room air)  Nasal Cannula O2 Flow Rate (L/min):  [2 L/min] 2 L/min  Orthostatic Blood Pressures      Flowsheet Row Most Recent Value   Blood Pressure 104/77 filed at 03/08/2025 0800   Patient Position - Orthostatic VS Lying filed at 03/08/2025 0400          First Weight: Weight - Scale: 79.3 kg (174 lb 13.2 oz) (03/03/25 0533)  Vitals:    03/07/25 0600 03/08/25 0533   Weight: 78 kg (171 lb 15.3 oz) 74.3 kg (163 lb 12.8 oz)     Physical Exam  Constitutional:       Appearance: He is well-developed.   HENT:      Head: Normocephalic and atraumatic.      Nose: Nose normal.   Eyes:      General:         Right eye: No discharge.         Left eye: No discharge.      Conjunctiva/sclera: Conjunctivae normal.      Pupils: Pupils are equal, round, and reactive to light.   Neck:      Thyroid: No thyromegaly.   Cardiovascular:      Rate and Rhythm: Regular rhythm. Tachycardia present.      Heart sounds: Normal heart sounds. No murmur heard.     No friction rub. No gallop.   Pulmonary:      Effort: Pulmonary effort is normal. No respiratory distress.      Breath sounds: Normal breath sounds. No stridor. No wheezing or rales.   Chest:      Chest wall: No tenderness.   Abdominal:  "     General: Bowel sounds are normal. There is no distension.      Palpations: Abdomen is soft. There is no mass.      Tenderness: There is no abdominal tenderness. There is no guarding or rebound.   Lymphadenopathy:      Cervical: No cervical adenopathy.   Skin:     General: Skin is warm and dry.   Neurological:      Mental Status: He is alert.   Psychiatric:         Behavior: Behavior normal.         Thought Content: Thought content normal.         Judgment: Judgment normal.           Lab Results: I have reviewed the following results:CBC/BMP:   .     03/08/25  0346   WBC 8.59   HGB 14.0   HCT 42.7      SODIUM 131*   K 3.6   CL 89*   CO2 33*   BUN 41*   CREATININE 1.87*   GLUC 106   MG 2.0      Results from last 7 days   Lab Units 03/08/25  0346 03/07/25  0443 03/06/25  0432   WBC Thousand/uL 8.59 8.72 10.19*   HEMOGLOBIN g/dL 14.0 14.1 14.2   HEMATOCRIT % 42.7 43.5 43.7   PLATELETS Thousands/uL 270 275 249     Results from last 7 days   Lab Units 03/08/25  0346 03/07/25  1541 03/07/25  0443   POTASSIUM mmol/L 3.6 4.5 3.7   CHLORIDE mmol/L 89* 90* 89*   CO2 mmol/L 33* 30 34*   BUN mg/dL 41* 40* 42*   CREATININE mg/dL 1.87* 2.03* 1.98*   CALCIUM mg/dL 8.6 8.9 8.7     Results from last 7 days   Lab Units 03/07/25  0443 03/06/25  0432 03/05/25  0359 03/02/25  1406 03/02/25  0947   INR   --   --   --   --  0.98   PTT seconds 85* 83* 65*   < > 27    < > = values in this interval not displayed.     Lab Results   Component Value Date    HGBA1C 5.7 (H) 03/03/2025     No results found for: \"CKTOTAL\", \"CKMB\", \"CKMBINDEX\", \"TROPONINI\"    Imaging Results Review: I reviewed radiology reports from this admission including: Echocardiogram.  Other Study Results Review: EKG was reviewed.     VTE Pharmacologic Prophylaxis: VTE covered by:  heparin (porcine), Subcutaneous, 5,000 Units at 03/08/25 0544      VTE Mechanical Prophylaxis: sequential compression device    Administrative Statements   Critical Care Time Statement: " Upon my evaluation, this patient had a high probability of imminent or life-threatening deterioration due to cardiogenic shock, which required my direct attention, intervention, and personal management.  I spent a total of 45 minutes directly providing critical care services, including complex medical decision making (to support/prevent further life-threatening deterioration).. This time is exclusive of procedures, teaching, family meetings, and any prior time recorded by providers other than myself.

## 2025-03-08 NOTE — ASSESSMENT & PLAN NOTE
3/2/25: Presented to [facility] with chest pain, diaphoresis, nausea. EKG without obvious acute ischemic changes. Bedside TTE revealed dilated cardiomyopathy and patient with evidence of cardiogenic shock. Transfer[facility] to [facility] heart failure management.   3/3/35 - 3/7/35: Ongoing inotropic support with diuresis to optimize for LHC   3/7/25 Impella-assisted LHC: LVEDP 25-30 mmHg    Plan:  Await hopeful recovery following coronary revascularization   Not a surgical or MCA candidate  Continue inotropic support;   Milrinone 0.25 mcg/kg/min   Wean as able based on SvO2, clinical exam, and endpoints   Diuresis as tolerated  Bumex 2mg TID  Appreciate Heart Failure cardiology recommendations   See plan below

## 2025-03-08 NOTE — ASSESSMENT & PLAN NOTE
Long-standing diagnosis   No acute concerns    Plan:   Continue home steroid regimen:   Prednisone 10mg qAM  Prednisone 5mg qHS  Outpatient follow up

## 2025-03-08 NOTE — PROGRESS NOTES
Progress Note - Critical Care/ICU   Name: Rodney Pizarro 60 y.o. male I MRN: [id number]  Unit/Bed#: PPHP-312-01 I Date of Admission: 3/2/2025   Date of Service: 3/8/2025 I Hospital Day: 6       Assessment & Plan  Cardiogenic shock (HCC)  3/2/25: Presented to [facility] with chest pain, diaphoresis, nausea. EKG without obvious acute ischemic changes. Bedside TTE revealed dilated cardiomyopathy and patient with evidence of cardiogenic shock. Transferred to [facility] for heart failure management.   3/3/35 - 3/7/35: Ongoing inotropic support with diuresis to optimize for LHC   3/7/25 Impella-assisted LHC: LVEDP 25-30 mmHg    Plan:  Await hopeful recovery following coronary revascularization   Not a surgical or MCA candidate  Continue inotropic support;   Milrinone 0.25 mcg/kg/min   Wean as able based on SvO2, clinical exam, and endpoints   Diuresis as tolerated  Bumex 2mg TID  Appreciate Heart Failure cardiology recommendations   See plan below   Ischemic dilated cardiomyopathy (HFrEF 20%) due to coronary artery disease  Secondary to prior CAD with mediation noncompliance and new ischemic disease resulting in cardiogenic shock    Relevant echocardiograms  3/2/25 TTE: LV cavity size is dilated. Apical aneurysmal dilatation. No apical thrombus. LVEF 15-20%. Systolic function is severely reduced. Severe global hypokinesis with apical aneurysmal dilatation and akinesis of the mid to apical anterior region. Thinning and akinesia in the basal to mid inferior consistent with transmural myocardial infarction. Diastolic function is severely abnormal, consistent with ungraded restrictive relaxation.     Plan:   Continue inotropic support;   Milrinone 0.25 mcg/kg/min   Diuresis as tolerated  Bumex 2mg TID  Obtain follow up echocardiogram at some point to evaluate for improvement   Daily weights  Cardiac diet, 2L fluid restriction  Coronary artery disease involving native coronary artery of native heart with unstable angina pectoris  (HCC)  Prior MI in 2010 and 2015. KASSIDY to LAD-Diag. Self-discontinued medications 4-5 years ago   3/2/25 LHC: 100 % proximal LAD occlusion at the site of previous stenting. 70% ostail LAD lesion. Large L Cx artery with a 90 % ostial lesion. 100 % RCA occlusion proximally.   3/7/25 Impella-assisted LHC: PCI to ostial LCx. Noted recanalization of LAD.     Plan:  ASA 81mg daily   Brilinta 90mg q12h   Atorvastatin 80mg qHS   Continue to monitor EKG for new, acute ischemic changes  Patient previously seen by Cardiac Surgery. Not a surgical candidate   Wegener's granulomatosis with renal involvement (HCC)  Long-standing diagnosis   No acute concerns    Plan:   Continue home steroid regimen:   Prednisone 10mg qAM  Prednisone 5mg qHS  Outpatient follow up   Acute kidney injury (HCC)  Multifactorial from cardiogenic shock, contrast exposures, possible underlying renal disease given history of Wegener's granulomatosis   Baseline creatinine: Unknown  Initial creatinine: 1.65 (3/2/25)   Last creatinine: 2.03 (3/7/25)     Plan:   Repeat BMP later today   Strict q2h I/O monitoring  Discontinue valencia catheter today   Continue to follow renal function tests  Nephrology consulted, appreciate recommendations   Severe mitral regurgitation  3/2/25 TTE: There is mild calcification. There is moderate annular calcification. There is moderate to severe regurgitation with an eccentrically directed jet. There is no evidence of stenosis.     Plan;   Volume removal and medical management   Moderate aortic insufficiency  3/2/25 TTE: The aortic valve is trileaflet. The leaflets are severely thickened. The leaflets are severely calcified. There is severely reduced mobility. There is moderate regurgitation. The aortic valve has no significant stenosis     Plan:   Volume removal and medical management   Palliative care encounter  Ongoing consultation and discussion of goals     Disposition: Critical care    ICU Core Measures     A: Assess, Prevent,  and Manage Pain Has pain been assessed? Yes  Need for changes to pain regimen? Yes   B: Both SAT/SAT  N/A   C: Choice of Sedation RASS Goal: 0 Alert and Calm or N/A patient not on sedation  Need for changes to sedation or analgesia regimen? NA   D: Delirium CAM-ICU: Negative   E: Early Mobility  Plan for early mobility? Yes   F: Family Engagement Plan for family engagement today? Yes       Review of Invasive Devices:    Covarrubias Plan: Covarrubias to be removed. Order has been placed  Central access plan: Medications requiring central line Hemodynamic monitoring      Prophylaxis:  VTE Initiate SQH TID    Stress Ulcer  not ordered         24 Hour Events : Patient returned from Impella-assisted [facility]. Milrinone decreaesd form 0.25mcg/kg/min with drop in SvO2. Milrinone increased. Ongoing diuresis.     Subjective   Review of Systems: Review of Systems    Objective :                   Vitals I/O      Most Recent Min/Max in 24hrs   Temp 99.5 °F (37.5 °C) Temp  Min: 98.4 °F (36.9 °C)  Max: 99.9 °F (37.7 °C)   Pulse 102 Pulse  Min: 97  Max: 112   Resp (!) 23 Resp  Min: 20  Max: 38   BP 93/62 BP  Min: 90/64  Max: 112/77   O2 Sat 96 % SpO2  Min: 96 %  Max: 100 %      Intake/Output Summary (Last 24 hours) at 3/8/2025 0102  Last data filed at 3/8/2025 0000  Gross per 24 hour   Intake 1279.03 ml   Output 2745 ml   Net -1465.97 ml       Diet Cardiovascular; Cardiac    Invasive Monitoring           Physical Exam   Physical Exam  Vitals and nursing note reviewed.   Eyes:      General: No scleral icterus.  Skin:     General: Skin is warm and dry.      Capillary Refill: Capillary refill takes less than 2 seconds.      Comments: Right groin soft, no hematoma, + ecchymosis    HENT:      Head: Normocephalic and atraumatic.   Neck:      Vascular: Central line present.   Cardiovascular:      Rate and Rhythm: Regular rhythm. Tachycardia present.      Pulses:           Radial pulses are 2+ on the right side and 2+ on the left side.        Dorsalis  pedis pulses are 2+ on the right side and 2+ on the left side.      Heart sounds: Normal heart sounds.   Musculoskeletal:      Right lower le+ Edema present.      Left lower le+ Edema present.   Abdominal: General: There is no distension.      Palpations: Abdomen is soft.      Tenderness: There is no abdominal tenderness.   Constitutional:       General: He is sleeping. He is not in acute distress.     Appearance: He is well-developed and well-nourished.      Interventions: Nasal cannula in place.   Pulmonary:      Effort: Pulmonary effort is normal.      Breath sounds: Normal breath sounds.   Psychiatric:         Behavior: Behavior is cooperative.   Neurological:      General: No focal deficit present.      Mental Status: He is oriented to person, place, and time and easily aroused.      GCS: GCS eye subscore is 4. GCS verbal subscore is 5. GCS motor subscore is 6.   Genitourinary/Anorectal:  Covarrubias present.        Diagnostic Studies        Lab Results: I have reviewed the following results:     Medications:  Scheduled PRN   aspirin, 81 mg, Daily  atorvastatin, 80 mg, Daily With Dinner  bumetanide, 2 mg, TID  chlorhexidine, 15 mL, Q12H ANDRESSA  predniSONE, 10 mg, Early Morning  predniSONE, 5 mg, HS  senna-docusate sodium, 1 tablet, BID  ticagrelor, 90 mg, Q12H ANDRESSA      acetaminophen, 650 mg, Q6H PRN  bisacodyl, 10 mg, Daily PRN  HYDROmorphone, 0.2 mg, Q2H PRN   Or  HYDROmorphone, 0.5 mg, Q2H PRN  ondansetron, 4 mg, Q6H PRN  polyethylene glycol, 17 g, Daily PRN  trimethobenzamide, 200 mg, Q6H PRN       Continuous    milrinone (Primacor) infusion, 0.25 mcg/kg/min, Last Rate: 0.25 mcg/kg/min (25 3453)         Labs:   CBC    Recent Labs     25  0432 25  0443   WBC 10.19* 8.72   HGB 14.2 14.1   HCT 43.7 43.5    275     BMP    Recent Labs     25  0443 25  1541   SODIUM 131* 129*   K 3.7 4.5   CL 89* 90*   CO2 34* 30   AGAP 8 9   BUN 42* 40*   CREATININE 1.98* 2.03*   CALCIUM 8.7  8.9       Coags    Recent Labs     03/06/25 0432 03/07/25  0443   PTT 83* 85*        Additional Electrolytes  Recent Labs     03/06/25 0432 03/07/25  0443   MG 2.0 1.8*   PHOS 4.3 4.4   CAIONIZED 1.03* 1.06*          Blood Gas    No recent results  Recent Labs     03/07/25  1946   PHVEN 7.394   TXP3FPV 49.7   PO2VEN 25.9*   NLW7IUY 29.7   BEVEN 3.7   A5CZYUU 41.4*    LFTs  Recent Labs     03/06/25 0432 03/07/25  0443   ALT 72* 56*   * 96*   ALKPHOS 46 49   ALB 3.4* 3.4*   TBILI 1.80* 1.69*       Infectious  No recent results  Glucose  Recent Labs     03/06/25  0432 03/06/25  1604 03/07/25  0443 03/07/25  1541   GLUC 127 115 118 120

## 2025-03-08 NOTE — PLAN OF CARE
Problem: CARDIOVASCULAR - ADULT  Goal: Absence of cardiac dysrhythmias or at baseline rhythm  Description: INTERVENTIONS:  - Continuous cardiac monitoring, vital signs, obtain 12 lead EKG if ordered  - Administer antiarrhythmic and heart rate control medications as ordered  - Monitor electrolytes and administer replacement therapy as ordered  Outcome: Progressing     Problem: Prexisting or High Potential for Compromised Skin Integrity  Goal: Skin integrity is maintained or improved  Description: INTERVENTIONS:  - Identify patients at risk for skin breakdown  - Assess and monitor skin integrity  - Assess and monitor nutrition and hydration status  - Monitor labs   - Assess for incontinence   - Turn and reposition patient  - Assist with mobility/ambulation  - Relieve pressure over bony prominences  - Avoid friction and shearing  - Provide appropriate hygiene as needed including keeping skin clean and dry  - Evaluate need for skin moisturizer/barrier cream  - Collaborate with interdisciplinary team   - Patient/family teaching  - Consider wound care consult   Outcome: Progressing     Problem: PAIN - ADULT  Goal: Verbalizes/displays adequate comfort level or baseline comfort level  Description: Interventions:  - Encourage patient to monitor pain and request assistance  - Assess pain using appropriate pain scale  - Administer analgesics based on type and severity of pain and evaluate response  - Implement non-pharmacological measures as appropriate and evaluate response  - Consider cultural and social influences on pain and pain management  - Notify physician/advanced practitioner if interventions unsuccessful or patient reports new pain  Outcome: Progressing     Problem: SAFETY ADULT  Goal: Patient will remain free of falls  Description: INTERVENTIONS:  - Educate patient/family on patient safety including physical limitations  - Instruct patient to call for assistance with activity   - Consult OT/PT to assist with  strengthening/mobility   - Keep Call bell within reach  - Keep bed low and locked with side rails adjusted as appropriate  - Keep care items and personal belongings within reach  - Initiate and maintain comfort rounds  - Make Fall Risk Sign visible to staff  - Offer Toileting every 2 Hours, in advance of need  - Initiate/Maintain bed/chair alarm  - Obtain necessary fall risk management equipment  - Apply yellow socks and bracelet for high fall risk patients  - Consider moving patient to room near nurses station  Outcome: Progressing  Goal: Maintain or return to baseline ADL function  Description: INTERVENTIONS:  -  Assess patient's ability to carry out ADLs; assess patient's baseline for ADL function and identify physical deficits which impact ability to perform ADLs (bathing, care of mouth/teeth, toileting, grooming, dressing, etc.)  - Assess/evaluate cause of self-care deficits   - Assess range of motion  - Assess patient's mobility; develop plan if impaired  - Assess patient's need for assistive devices and provide as appropriate  - Encourage maximum independence but intervene and supervise when necessary  - Involve family in performance of ADLs  - Assess for home care needs following discharge   - Consider OT consult to assist with ADL evaluation and planning for discharge  - Provide patient education as appropriate  Outcome: Progressing  Goal: Maintains/Returns to pre admission functional level  Description: INTERVENTIONS:  - Perform AM-PAC 6 Click Basic Mobility/ Daily Activity assessment daily.  - Set and communicate daily mobility goal to care team and patient/family/caregiver.   - Collaborate with rehabilitation services on mobility goals if consulted  - Perform Range of Motion 4 times a day.  - Reposition patient every 2 hours.  - Dangle patient 4 times a day  - Stand patient 4 times a day  - Ambulate patient 4 times a day  - Out of bed to chair 3 times a day   - Out of bed for meals 3 times a day  - Out  of bed for toileting  - Record patient progress and toleration of activity level   Outcome: Progressing

## 2025-03-08 NOTE — ASSESSMENT & PLAN NOTE
Secondary to prior CAD with mediation noncompliance and new ischemic disease resulting in cardiogenic shock    Relevant echocardiograms  3/2/25 TTE: LV cavity size is dilated. Apical aneurysmal dilatation. No apical thrombus. LVEF 15-20%. Systolic function is severely reduced. Severe global hypokinesis with apical aneurysmal dilatation and akinesis of the mid to apical anterior region. Thinning and akinesia in the basal to mid inferior consistent with transmural myocardial infarction. Diastolic function is severely abnormal, consistent with ungraded restrictive relaxation.     Plan:   Continue inotropic support;   Milrinone 0.25 mcg/kg/min   Diuresis as tolerated  Bumex 2mg TID  Obtain follow up echocardiogram at some point to evaluate for improvement   Daily weights  Cardiac diet, 2L fluid restriction

## 2025-03-08 NOTE — ASSESSMENT & PLAN NOTE
Has prior history of MI in 2011 and 2014 s/p PCI to LAD and RCA  Presented with chest pain diaphoresis and elevated troponin  EKG did not reveal ST elevations, did have inferior Q waves and right bundle branch block    Left heart cath 3/2 revealed 100% proximal RCA lesion, 100% LAD lesion and 90% ostial left circumflex lesion.     S/p high risk PCI to ostial LCx with Impella support on 3/8.  Cath findings also showed 60% ostial LAD lesion and 40% left main lesion.    Continue aspirin and Brilinta  Continue Atorvastatin  Continue management of heart failure and shock as above.

## 2025-03-08 NOTE — ASSESSMENT & PLAN NOTE
3/2/25 TTE: There is mild calcification. There is moderate annular calcification. There is moderate to severe regurgitation with an eccentrically directed jet. There is no evidence of stenosis.     Plan;   Volume removal and medical management

## 2025-03-08 NOTE — ASSESSMENT & PLAN NOTE
In the setting of ischemic cardiomyopathy and decompensated heart failure  SCAI stage A/B  Currently on milrinone 0.25, SVO 2 of 52.3, hemoglobin of 14  Stefani: CO: 3.3, Cardiac Index: 1.8  Warm and euvolemic  MAP of 82  LFTs improved, kidney function stable  I/O last 24 hours: I 1.1L, O -2.7L, I/O -1.6, Net -10.1L  No significant events noted on telemetry  No further fevers    Plan:  S/p high risk PCI to ostial LCx with Impella support on 3/7.  Awaiting hopeful recovery following revascularization.  Overall volume status is improving, and doing well on 0.25 of milrinone, continue.    Continue Bumex 2 mg 3 times daily, aim for net -500 cc. May consider decreasing.   Amiodarone discontinued 3/7.  Monitor I's and O's and renal function.  Monitor electrolytes and replete as needed.  Telemetry monitoring.  Obtain repeat echo next week to evaluate for improvement.  Not currently a candidate for OHT/LVAD.  Not a surgical candidate.

## 2025-03-08 NOTE — ASSESSMENT & PLAN NOTE
3/2/25 TTE: The aortic valve is trileaflet. The leaflets are severely thickened. The leaflets are severely calcified. There is severely reduced mobility. There is moderate regurgitation. The aortic valve has no significant stenosis     Plan:   Volume removal and medical management

## 2025-03-09 LAB
ANION GAP SERPL CALCULATED.3IONS-SCNC: 11 MMOL/L (ref 4–13)
BACTERIA BLD CULT: NORMAL
BACTERIA BLD CULT: NORMAL
BASE EX.OXY STD BLDV CALC-SCNC: 57.9 % (ref 60–80)
BASE EX.OXY STD BLDV CALC-SCNC: 64.6 % (ref 60–80)
BASE EX.OXY STD BLDV CALC-SCNC: 74.6 % (ref 60–80)
BASE EXCESS BLDV CALC-SCNC: 4.3 MMOL/L
BASE EXCESS BLDV CALC-SCNC: 4.6 MMOL/L
BASE EXCESS BLDV CALC-SCNC: 6.6 MMOL/L
BODY TEMPERATURE: 98.1 DEGREES FEHRENHEIT
BUN SERPL-MCNC: 38 MG/DL (ref 5–25)
CALCIUM SERPL-MCNC: 8.5 MG/DL (ref 8.4–10.2)
CHLORIDE SERPL-SCNC: 91 MMOL/L (ref 96–108)
CO2 SERPL-SCNC: 32 MMOL/L (ref 21–32)
CREAT SERPL-MCNC: 1.62 MG/DL (ref 0.6–1.3)
ERYTHROCYTE [DISTWIDTH] IN BLOOD BY AUTOMATED COUNT: 13 % (ref 11.6–15.1)
GFR SERPL CREATININE-BSD FRML MDRD: 45 ML/MIN/1.73SQ M
GLUCOSE SERPL-MCNC: 125 MG/DL (ref 65–140)
HCO3 BLDV-SCNC: 29.3 MMOL/L (ref 24–30)
HCO3 BLDV-SCNC: 29.8 MMOL/L (ref 24–30)
HCO3 BLDV-SCNC: 31 MMOL/L (ref 24–30)
HCT VFR BLD AUTO: 43.6 % (ref 36.5–49.3)
HGB BLD-MCNC: 14.4 G/DL (ref 12–17)
MAGNESIUM SERPL-MCNC: 2 MG/DL (ref 1.9–2.7)
MCH RBC QN AUTO: 29.6 PG (ref 26.8–34.3)
MCHC RBC AUTO-ENTMCNC: 33 G/DL (ref 31.4–37.4)
MCV RBC AUTO: 90 FL (ref 82–98)
NON VENT ROOM AIR: ABNORMAL %
O2 CT BLDV-SCNC: 12.6 ML/DL
O2 CT BLDV-SCNC: 13.8 ML/DL
O2 CT BLDV-SCNC: 16.1 ML/DL
PCO2 BLD: 42.9 MM HG (ref 42–50)
PCO2 BLDV: 43.3 MM HG (ref 42–50)
PCO2 BLDV: 43.5 MM HG (ref 42–50)
PCO2 BLDV: 47.5 MM HG (ref 42–50)
PH BLD: 7.45 [PH] (ref 7.3–7.4)
PH BLDV: 7.42 [PH] (ref 7.3–7.4)
PH BLDV: 7.45 [PH] (ref 7.3–7.4)
PH BLDV: 7.47 [PH] (ref 7.3–7.4)
PHOSPHATE SERPL-MCNC: 3.5 MG/DL (ref 2.7–4.5)
PLATELET # BLD AUTO: 293 THOUSANDS/UL (ref 149–390)
PMV BLD AUTO: 11.1 FL (ref 8.9–12.7)
PO2 BLDV: 31.5 MM HG (ref 35–45)
PO2 BLDV: 33.9 MM HG (ref 35–45)
PO2 BLDV: 42.8 MM HG (ref 35–45)
PO2 VENOUS TEMP CORRECTED: 30.8 MM HG (ref 35–45)
POTASSIUM SERPL-SCNC: 3.7 MMOL/L (ref 3.5–5.3)
RBC # BLD AUTO: 4.86 MILLION/UL (ref 3.88–5.62)
SODIUM SERPL-SCNC: 134 MMOL/L (ref 135–147)
WBC # BLD AUTO: 10.44 THOUSAND/UL (ref 4.31–10.16)

## 2025-03-09 PROCEDURE — 97116 GAIT TRAINING THERAPY: CPT

## 2025-03-09 PROCEDURE — 82805 BLOOD GASES W/O2 SATURATION: CPT | Performed by: PHYSICIAN ASSISTANT

## 2025-03-09 PROCEDURE — 99233 SBSQ HOSP IP/OBS HIGH 50: CPT | Performed by: STUDENT IN AN ORGANIZED HEALTH CARE EDUCATION/TRAINING PROGRAM

## 2025-03-09 PROCEDURE — 85027 COMPLETE CBC AUTOMATED: CPT | Performed by: STUDENT IN AN ORGANIZED HEALTH CARE EDUCATION/TRAINING PROGRAM

## 2025-03-09 PROCEDURE — 99233 SBSQ HOSP IP/OBS HIGH 50: CPT | Performed by: INTERNAL MEDICINE

## 2025-03-09 PROCEDURE — 83735 ASSAY OF MAGNESIUM: CPT | Performed by: STUDENT IN AN ORGANIZED HEALTH CARE EDUCATION/TRAINING PROGRAM

## 2025-03-09 PROCEDURE — NC001 PR NO CHARGE: Performed by: PHYSICIAN ASSISTANT

## 2025-03-09 PROCEDURE — NC001 PR NO CHARGE: Performed by: STUDENT IN AN ORGANIZED HEALTH CARE EDUCATION/TRAINING PROGRAM

## 2025-03-09 PROCEDURE — 80048 BASIC METABOLIC PNL TOTAL CA: CPT | Performed by: STUDENT IN AN ORGANIZED HEALTH CARE EDUCATION/TRAINING PROGRAM

## 2025-03-09 PROCEDURE — 84100 ASSAY OF PHOSPHORUS: CPT | Performed by: STUDENT IN AN ORGANIZED HEALTH CARE EDUCATION/TRAINING PROGRAM

## 2025-03-09 RX ORDER — POLYETHYLENE GLYCOL 3350 17 G/17G
17 POWDER, FOR SOLUTION ORAL DAILY
Status: DISCONTINUED | OUTPATIENT
Start: 2025-03-09 | End: 2025-03-11 | Stop reason: HOSPADM

## 2025-03-09 RX ORDER — POTASSIUM CHLORIDE 1500 MG/1
40 TABLET, EXTENDED RELEASE ORAL ONCE
Status: COMPLETED | OUTPATIENT
Start: 2025-03-09 | End: 2025-03-09

## 2025-03-09 RX ORDER — HYDRALAZINE HYDROCHLORIDE 10 MG/1
10 TABLET, FILM COATED ORAL EVERY 8 HOURS SCHEDULED
Status: DISCONTINUED | OUTPATIENT
Start: 2025-03-09 | End: 2025-03-10

## 2025-03-09 RX ADMIN — ASPIRIN 81 MG: 81 TABLET, CHEWABLE ORAL at 08:35

## 2025-03-09 RX ADMIN — PREDNISONE 10 MG: 10 TABLET ORAL at 05:11

## 2025-03-09 RX ADMIN — OXYCODONE HYDROCHLORIDE 5 MG: 5 TABLET ORAL at 04:47

## 2025-03-09 RX ADMIN — PREDNISONE 5 MG: 10 TABLET ORAL at 21:28

## 2025-03-09 RX ADMIN — CHLORHEXIDINE GLUCONATE 15 ML: 1.2 SOLUTION ORAL at 08:35

## 2025-03-09 RX ADMIN — POLYETHYLENE GLYCOL 3350 17 G: 17 POWDER, FOR SOLUTION ORAL at 16:21

## 2025-03-09 RX ADMIN — ISOSORBIDE DINITRATE 10 MG: 10 TABLET ORAL at 08:36

## 2025-03-09 RX ADMIN — HEPARIN SODIUM 5000 UNITS: 5000 INJECTION INTRAVENOUS; SUBCUTANEOUS at 14:44

## 2025-03-09 RX ADMIN — HYDRALAZINE HYDROCHLORIDE 10 MG: 10 TABLET ORAL at 14:45

## 2025-03-09 RX ADMIN — ISOSORBIDE DINITRATE 10 MG: 10 TABLET ORAL at 18:00

## 2025-03-09 RX ADMIN — OXYCODONE HYDROCHLORIDE 5 MG: 5 TABLET ORAL at 17:59

## 2025-03-09 RX ADMIN — BUMETANIDE 2 MG: 0.25 INJECTION INTRAMUSCULAR; INTRAVENOUS at 21:21

## 2025-03-09 RX ADMIN — TICAGRELOR 90 MG: 90 TABLET ORAL at 08:35

## 2025-03-09 RX ADMIN — SENNOSIDES AND DOCUSATE SODIUM 1 TABLET: 50; 8.6 TABLET ORAL at 08:35

## 2025-03-09 RX ADMIN — POTASSIUM CHLORIDE 40 MEQ: 1500 TABLET, EXTENDED RELEASE ORAL at 08:35

## 2025-03-09 RX ADMIN — BUMETANIDE 2 MG: 0.25 INJECTION INTRAMUSCULAR; INTRAVENOUS at 08:35

## 2025-03-09 RX ADMIN — SENNOSIDES AND DOCUSATE SODIUM 1 TABLET: 50; 8.6 TABLET ORAL at 17:59

## 2025-03-09 RX ADMIN — OXYCODONE HYDROCHLORIDE 5 MG: 5 TABLET ORAL at 11:09

## 2025-03-09 RX ADMIN — HYDRALAZINE HYDROCHLORIDE 10 MG: 10 TABLET ORAL at 21:29

## 2025-03-09 RX ADMIN — HEPARIN SODIUM 5000 UNITS: 5000 INJECTION INTRAVENOUS; SUBCUTANEOUS at 05:11

## 2025-03-09 RX ADMIN — OXYMETAZOLINE HYDROCHLORIDE 2 SPRAY: 0.5 SPRAY NASAL at 18:02

## 2025-03-09 RX ADMIN — HYDRALAZINE HYDROCHLORIDE 10 MG: 10 TABLET ORAL at 09:54

## 2025-03-09 RX ADMIN — BUMETANIDE 2 MG: 0.25 INJECTION INTRAMUSCULAR; INTRAVENOUS at 16:21

## 2025-03-09 RX ADMIN — ISOSORBIDE DINITRATE 10 MG: 10 TABLET ORAL at 11:50

## 2025-03-09 RX ADMIN — ATORVASTATIN CALCIUM 80 MG: 80 TABLET, FILM COATED ORAL at 16:21

## 2025-03-09 RX ADMIN — HEPARIN SODIUM 5000 UNITS: 5000 INJECTION INTRAVENOUS; SUBCUTANEOUS at 21:29

## 2025-03-09 RX ADMIN — TICAGRELOR 90 MG: 90 TABLET ORAL at 21:28

## 2025-03-09 NOTE — ASSESSMENT & PLAN NOTE
Multifactorial from cardiogenic shock, contrast exposures, possible underlying renal disease given history of Wegener's granulomatosis   Baseline creatinine: Unknown  Initial creatinine: 1.65 (3/2/25)   Last creatinine: 1.87 (3/8/25)     Plan:   Repeat BMP later today   Strict q4h I/O monitoring  Monitor UOP without valencia catheter   Continue to follow renal function tests  Nephrology consulted, appreciate recommendations

## 2025-03-09 NOTE — PROGRESS NOTES
Progress Note - Critical Care/ICU   Name: Rodney Pizarro 60 y.o. male I MRN: [id number]  Unit/Bed#: PPHP-312-01 I Date of Admission: 3/2/2025   Date of Service: 3/9/2025 I Hospital Day: 7      Assessment & Plan  Cardiogenic shock (HCC)  3/2/25: Presented to [facility] with chest pain, diaphoresis, nausea. EKG without obvious acute ischemic changes. Bedside TTE revealed dilated cardiomyopathy and patient with evidence of cardiogenic shock. Transferred to [facility] for heart failure management.   3/3/35 - 3/7/35: Ongoing inotropic support with diuresis to optimize for LHC   3/7/25 Impella-assisted LHC: LVEDP 25-30 mmHg    Plan:  Await hopeful recovery following coronary revascularization   Not a surgical or MCA candidate  Continue inotropic support;   Milrinone 0.13 mcg/kg/min   Discontinue able based on SvO2, clinical exam, and endpoints   Diuresis as tolerated  Bumex 2mg TID  Appreciate Heart Failure cardiology recommendations   See plan below   Ischemic dilated cardiomyopathy (HFrEF 20%) due to coronary artery disease  Secondary to prior CAD with mediation noncompliance and new ischemic disease resulting in cardiogenic shock    Relevant echocardiograms  3/2/25 TTE: LV cavity size is dilated. Apical aneurysmal dilatation. No apical thrombus. LVEF 15-20%. Systolic function is severely reduced. Severe global hypokinesis with apical aneurysmal dilatation and akinesis of the mid to apical anterior region. Thinning and akinesia in the basal to mid inferior consistent with transmural myocardial infarction. Diastolic function is severely abnormal, consistent with ungraded restrictive relaxation.     Plan:   Continue inotropic support;   Milrinone 0.13 mcg/kg/min  Attempt to discontinue today    Afterload reduction:   Isordil 10mg TID   Diuresis as tolerated  Bumex 2mg TID  Obtain follow up echocardiogram tomorrow to evaluate for improvement   Daily weights  Cardiac diet, 2L fluid restriction  Coronary artery disease involving native  coronary artery of native heart with unstable angina pectoris (HCC)  Prior MI in 2010 and 2015. KASSIDY to LAD-Diag. Self-discontinued medications 4-5 years ago   3/2/25 LHC: 100 % proximal LAD occlusion at the site of previous stenting. 70% ostail LAD lesion. Large L Cx artery with a 90 % ostial lesion. 100 % RCA occlusion proximally.   3/7/25 Impella-assisted LHC: PCI to ostial LCx. Noted recanalization of LAD.     Plan:  ASA 81mg daily   Brilinta 90mg q12h   Atorvastatin 80mg qHS   Continue to monitor EKG for new, acute ischemic changes  Patient previously seen by Cardiac Surgery. Not a surgical candidate   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)  Long-standing diagnosis   No acute concerns    Plan:   Continue home steroid regimen:   Prednisone 10mg qAM  Prednisone 5mg qHS  Outpatient follow up   Acute kidney injury (HCC)  Multifactorial from cardiogenic shock, contrast exposures, possible underlying renal disease given history of Wegener's granulomatosis   Baseline creatinine: Unknown  Initial creatinine: 1.65 (3/2/25)   Last creatinine: 1.87 (3/8/25)     Plan:   Repeat BMP later today   Strict q4h I/O monitoring  Monitor UOP without valencia catheter   Continue to follow renal function tests  Nephrology consulted, appreciate recommendations   Severe mitral regurgitation  3/2/25 TTE: There is mild calcification. There is moderate annular calcification. There is moderate to severe regurgitation with an eccentrically directed jet. There is no evidence of stenosis.     Plan;   Volume removal and medical management   Moderate aortic insufficiency  3/2/25 TTE: The aortic valve is trileaflet. The leaflets are severely thickened. The leaflets are severely calcified. There is severely reduced mobility. There is moderate regurgitation. The aortic valve has no significant stenosis     Plan:   Volume removal and medical management   Palliative care encounter  Ongoing consultation and discussion of goals  "    Disposition: Stepdown Level 1.     ICU Core Measures     A: Assess, Prevent, and Manage Pain Has pain been assessed? Yes  Need for changes to pain regimen? No   B: Both SAT/SAT  N/A   C: Choice of Sedation RASS Goal: 0 Alert and Calm or N/A patient not on sedation  Need for changes to sedation or analgesia regimen? NA   D: Delirium CAM-ICU: Negative   E: Early Mobility  Plan for early mobility? Yes   F: Family Engagement Plan for family engagement today? Yes       Review of Invasive Devices:      Central access plan: Hemodynamic monitoring      Prophylaxis:  VTE VTE covered by:  heparin (porcine), Subcutaneous, 5,000 Units at 03/08/25 2145       Stress Ulcer  not ordered         24 Hour Events : Milrinone weaned with no change in clinical exam. Continues to make urine with TID bumex. Tolerated afterload reduction.   Subjective   Review of Systems: Review of Systems   Respiratory:  Negative for shortness of breath.    Cardiovascular:  Negative for chest pain, palpitations and leg swelling.   Gastrointestinal:  Negative for nausea.   Musculoskeletal:  Positive for myalgias. Joint swelling: Right groin \"burning\" with ambulation.  Neurological: Negative.        Objective :                   Vitals I/O      Most Recent Min/Max in 24hrs   Temp 98.3 °F (36.8 °C) Temp  Min: 97.8 °F (36.6 °C)  Max: 99.9 °F (37.7 °C)   Pulse (!) 109 Pulse  Min: 104  Max: 120   Resp 21 Resp  Min: 16  Max: 41   BP 99/76 BP  Min: 95/66  Max: 131/85   O2 Sat 94 % SpO2  Min: 91 %  Max: 98 %      Intake/Output Summary (Last 24 hours) at 3/9/2025 0319  Last data filed at 3/8/2025 2233  Gross per 24 hour   Intake 776.63 ml   Output 2400 ml   Net -1623.37 ml       Diet Cardiovascular; Cardiac; Fluid Restriction 2000 ML    Invasive Monitoring           Physical Exam   Physical Exam  Vitals and nursing note reviewed.   Eyes:      General: No scleral icterus.  Skin:     General: Skin is warm and dry.      Capillary Refill: Capillary refill takes less " than 2 seconds.   HENT:      Head: Normocephalic and atraumatic.      Mouth/Throat:      Lips: Pink.      Mouth: Mucous membranes are dry.   Neck:      Vascular: Central line present. No JVD.   Cardiovascular:      Rate and Rhythm: Regular rhythm. Tachycardia present.      Pulses:           Radial pulses are 2+ on the right side and 2+ on the left side.        Dorsalis pedis pulses are 2+ on the right side and 2+ on the left side.      Heart sounds: Murmur heard.   Musculoskeletal:      Right lower leg: No edema.      Left lower leg: No edema.   Abdominal: General: There is no distension.      Palpations: Abdomen is soft.      Tenderness: There is no abdominal tenderness.      Comments: Small ecchymosis of right groin. No hematoma    Constitutional:       General: He is awake. He is not in acute distress.     Appearance: He is well-developed and well-nourished.   Pulmonary:      Effort: Pulmonary effort is normal.      Breath sounds: Normal breath sounds.   Psychiatric:         Behavior: Behavior is cooperative.   Neurological:      General: No focal deficit present.      Mental Status: He is alert and oriented to person, place, and time.      GCS: GCS eye subscore is 4. GCS verbal subscore is 5. GCS motor subscore is 6.          Diagnostic Studies        Lab Results: I have reviewed the following results:     Medications:  Scheduled PRN   aspirin, 81 mg, Daily  atorvastatin, 80 mg, Daily With Dinner  bumetanide, 2 mg, TID  chlorhexidine, 15 mL, Q12H ANDRESSA  heparin (porcine), 5,000 Units, Q8H ANDRESSA  isosorbide dinitrate, 10 mg, TID after meals  predniSONE, 10 mg, Early Morning  predniSONE, 5 mg, HS  senna-docusate sodium, 1 tablet, BID  ticagrelor, 90 mg, Q12H ANDRESSA      acetaminophen, 650 mg, Q6H PRN  bisacodyl, 10 mg, Daily PRN  ondansetron, 4 mg, Q6H PRN  oxyCODONE, 2.5 mg, Q4H PRN   Or  oxyCODONE, 5 mg, Q4H PRN  oxymetazoline, 2 spray, Q12H PRN  polyethylene glycol, 17 g, Daily PRN  trimethobenzamide, 200 mg, Q6H  PRN       Continuous    milrinone (Primacor) infusion, 0.13 mcg/kg/min, Last Rate: 0.13 mcg/kg/min (03/08/25 2047)         Labs:   CBC    Recent Labs     03/07/25 0443 03/08/25 0346   WBC 8.72 8.59   HGB 14.1 14.0   HCT 43.5 42.7    270     BMP    Recent Labs     03/07/25  1541 03/08/25 0346   SODIUM 129* 131*   K 4.5 3.6   CL 90* 89*   CO2 30 33*   AGAP 9 9   BUN 40* 41*   CREATININE 2.03* 1.87*   CALCIUM 8.9 8.6       Coags    Recent Labs     03/07/25 0443   PTT 85*        Additional Electrolytes  Recent Labs     03/07/25 0443 03/08/25 0346   MG 1.8* 2.0   PHOS 4.4  --    CAIONIZED 1.06*  --           Blood Gas    No recent results  Recent Labs     03/08/25 2032   PHVEN 7.442*   JOA9JZT 47.0   PO2VEN 26.9*   KAP6KSR 31.3*   BEVEN 6.1   O7COXML 45.2*    LFTs  Recent Labs     03/07/25 0443   ALT 56*   AST 96*   ALKPHOS 49   ALB 3.4*   TBILI 1.69*       Infectious  No recent results  Glucose  Recent Labs     03/07/25 0443 03/07/25  1541 03/08/25  0346   GLUC 118 120 106

## 2025-03-09 NOTE — PROGRESS NOTES
INTERNAL MEDICINE RESIDENCY TRANSFER ACCEPTANCE NOTE     Name: Rodney Pizarro   Age & Sex: 60 y.o. 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 Apical aneurysmal dilatation. No apical thrombus. LVEF 15-20%. Systolic function is severely reduced. Severe global hypokinesis with apical aneurysmal dilatation and akinesis of the mid to apical anterior region. Thinning and akinesia in the basal to mid inferior consistent with transmural myocardial infarction. Diastolic function is severely abnormal, consistent with ungraded restrictive relaxation.      Plan:   Continue inotropic support;   Hydralazine  Previously required milrinone    Afterload reduction:   Isordil 10mg TID   Diuresis as tolerated  Bumex 2mg TID  Obtain follow up echocardiogram tomorrow to evaluate for improvement   Daily weights  Cardiac diet, 2L fluid restriction    Palliative care encounter  Assessment & Plan  Ongoing consultation and discussion of goals        Moderate aortic insufficiency  Assessment & Plan  3/2/25 TTE: The aortic valve is trileaflet. The  leaflets are severely thickened. The leaflets are severely calcified. There is severely reduced mobility. There is moderate regurgitation. The aortic valve has no significant stenosis      Plan:   Volume removal and medical management     Severe mitral regurgitation  Assessment & Plan  Likely functional in the setting of dilated cardiomyopathy       Acute kidney injury (HCC)  Assessment & Plan  Multifactorial from cardiogenic shock, contrast exposures, possible underlying renal disease given history of Wegener's granulomatosis   Baseline creatinine: Unknown  Initial creatinine: 1.65 (3/2/25)   Last creatinine: 1.87 (3/8/25)      Plan:   Repeat BMP later today   Strict q4h I/O monitoring  Monitor UOP without valencia catheter   Continue to follow renal function tests    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)  Assessment & Plan  Long-standing diagnosis   No acute concerns     Plan:   Continue home steroid regimen:   Prednisone 10mg qAM  Prednisone 5mg qHS  Outpatient follow up     Coronary artery disease involving native coronary artery of native heart with unstable angina pectoris (HCC)  Assessment & Plan  Has prior history of MI in 2011 and 2014 s/p PCI to LAD and RCA  Presented with chest pain diaphoresis and elevated troponin  EKG did not reveal ST elevations, did have inferior Q waves and right bundle branch block     Left heart cath 3/2 revealed 100% proximal RCA lesion, 100% LAD lesion and 90% ostial left circumflex lesion.      S/p high risk PCI to ostial LCx with Impella support on 3/8.  Cath findings also showed 60% ostial LAD lesion and 40% left main lesion.     Continue aspirin and Brilinta  Continue Atorvastatin  Continue management of heart failure and shock as above.     * Cardiogenic shock (HCC)  Assessment & Plan  In the setting of ischemic cardiomyopathy and decompensated heart failure  SCAI stage A/B  Currently on milrinone 0.25, SVO 2 of 52.3, hemoglobin of 14  Stefani: CO: 3.3,  Cardiac Index: 1.8  Warm and euvolemic  MAP of 82  LFTs improved, kidney function stable  I/O last 24 hours: I 1.1L, O -2.7L, I/O -1.6, Net -10.1L  No significant events noted on telemetry  No further fevers     Plan:  S/p high risk PCI to ostial LCx with Impella support on 3/7.  Awaiting hopeful recovery following revascularization.  Overall volume status is improving, and doing well on 0.25 of milrinone, continue.    Continue Bumex 2 mg 3 times daily, aim for net -500 cc. May consider decreasing.   Amiodarone discontinued 3/7.  Monitor I's and O's and renal function.  Monitor electrolytes and replete as needed.  Telemetry monitoring.  Obtain repeat echo next week to evaluate for improvement.  Not currently a candidate for OHT/LVAD.  Not a surgical candidate.        VTE Pharmacologic Prophylaxis: VTE covered by:  heparin (porcine), Subcutaneous, 5,000 Units at 03/09/25 1444      VTE Mechanical Prophylaxis: sequential compression device    HOSPITAL COURSE     60-year-old male presented to [facility] emergency department on 3/2 with complaints of chest pain. Mentally hypotensive with mild troponin elevation. Echocardiogram was performed which revealed an EF of 15 to 20% which is new for the patient. Decision was made to transfer the patient to [facility] cardiac ICU. He was hypotensive and initiated on Levophed infusion. He underwent urgent cardiac catheterization which revealed 100% proximal LAD occlusion at the site of the previous stenting and 90% ostial lesion of the left circumflex. No intervention was performed at that time. He would be very high risk for surgical revascularization given his mitral and aortic valve disease as well. He was initiated on milrinone infusion as well as Bumex infusion with improvement of his hemodynamics. He was seen in consultation by heart failure. He ultimately underwent assisted PCI of the circumflex on 3/7. Since then heart failure has been initiating GDMT and  weaning his milrinone infusion. He is not currently a transplant candidate or LVAD candidate secondary to tobacco use and no health insurance. Patient is diuresing well on intermittent Bumex bolusing and is stable for transfer to [facility]/telemetry.     SUBJECTIVE     Patient feels well with no complains. Denies chest pain, SOB, abd pain, nausea. Has not had a BM since last week. Was able to work with PT and walk around with no issues today.     OBJECTIVE     Vitals:    03/09/25 1145 03/09/25 1200 03/09/25 1300 03/09/25 1445   BP:  99/71 94/75 99/79   Pulse: (!) 107 103 (!) 107    Resp: 20 (!) 25 20    Temp:       TempSrc:       SpO2: 99% 96% 95%    Weight:       Height:         I/O last 24 hours:  In: 1029.7 [P.O.:940; I.V.:89.7]  Out: 3675 [Urine:3675]    Physical Exam  Vitals and nursing note reviewed.   Constitutional:       General: He is not in acute distress.     Appearance: He is well-developed.   HENT:      Head: Normocephalic and atraumatic.   Eyes:      Conjunctiva/sclera: Conjunctivae normal.   Cardiovascular:      Rate and Rhythm: Normal rate and regular rhythm.      Heart sounds: No murmur heard.  Pulmonary:      Effort: Pulmonary effort is normal. No respiratory distress.      Breath sounds: Normal breath sounds.   Abdominal:      Palpations: Abdomen is soft.      Tenderness: There is no abdominal tenderness.   Musculoskeletal:         General: No swelling.      Cervical back: Neck supple.      Right lower leg: No edema.      Left lower leg: No edema.   Skin:     General: Skin is warm and dry.      Capillary Refill: Capillary refill takes less than 2 seconds.   Neurological:      General: No focal deficit present.      Mental Status: He is alert.   Psychiatric:         Mood and Affect: Mood normal.       LABORATORY DATA     Labs: I have personally reviewed pertinent reports.    Results from last 7 days   Lab Units 03/09/25  0407 03/08/25  0346 03/07/25  0443   WBC Thousand/uL 10.44* 8.59 8.72    HEMOGLOBIN g/dL 14.4 14.0 14.1   HEMATOCRIT % 43.6 42.7 43.5   PLATELETS Thousands/uL 293 270 275      Results from last 7 days   Lab Units 03/09/25  0407 03/08/25  0346 03/07/25  1541 03/07/25  0443 03/06/25  1604 03/06/25  0432 03/05/25  1611 03/05/25  0403   POTASSIUM mmol/L 3.7 3.6 4.5 3.7   < > 3.6   < > 3.9   CHLORIDE mmol/L 91* 89* 90* 89*   < > 91*   < > 93*   CO2 mmol/L 32 33* 30 34*   < > 33*   < > 33*   BUN mg/dL 38* 41* 40* 42*   < > 38*   < > 37*   CREATININE mg/dL 1.62* 1.87* 2.03* 1.98*   < > 1.89*   < > 1.91*   CALCIUM mg/dL 8.5 8.6 8.9 8.7   < > 8.3*   < > 8.6   ALK PHOS U/L  --   --   --  49  --  46  --  47   ALT U/L  --   --   --  56*  --  72*  --  100*   AST U/L  --   --   --  96*  --  169*  --  372*    < > = values in this interval not displayed.     Results from last 7 days   Lab Units 03/09/25  0407 03/08/25 0346 03/07/25 0443   MAGNESIUM mg/dL 2.0 2.0 1.8*     Results from last 7 days   Lab Units 03/09/25  0407 03/07/25  0443 03/06/25  0432   PHOSPHORUS mg/dL 3.5 4.4 4.3      Results from last 7 days   Lab Units 03/07/25  0443 03/06/25  0432 03/05/25  0359   PTT seconds 85* 83* 65*     Results from last 7 days   Lab Units 03/04/25  1047   LACTIC ACID mmol/L 1.3         Micro:  Lab Results   Component Value Date    BLOODCX No Growth After 5 Days. 03/04/2025    BLOODCX No Growth After 5 Days. 03/04/2025    WOUNDCULT No growth 05/25/2023     IMAGING & DIAGNOSTIC TESTING     Imaging: I have personally reviewed pertinent reports.    XR chest portable ICU  Result Date: 3/3/2025  Impression: No acute cardiopulmonary disease. Workstation performed: [id number]     XR chest 1 view portable  Result Date: 3/2/2025  Impression: No acute cardiopulmonary disease. Workstation performed: [id number]     CTA dissection protocol chest/abdomen/pelvis  Result Date: 3/2/2025  Impression: 1.  No evidence of aortic aneurysm or dissection. 2.  Although not a dedicated CT coronary angiogram, there appears to be  absent perfusion/occlusion of the left anterior descending artery (series 3/58). Consider urgent interventional cardiology consultation in the appropriate clinical setting. 3.  Moderate to marked cardiomegaly with heavily calcified coronary arteries. 4.  No acute intra-abdominal or pelvic pathology. Right renal atrophy. 5.  Colonic diverticulosis without diverticulitis. Findings were communicated with Dr. MEDHAT RINCON on 3/2/2025 at 9:21 AM via HIPPA compliant electronic text messaging with confirmation of receipt by response text at 9:23 AM. Workstation performed: [id number]     EKG, Pathology, and Other Studies: I have personally reviewed pertinent reports.     ALLERGIES   No Known Allergies  MEDICATIONS     Current Facility-Administered Medications   Medication Dose Route Frequency Provider Last Rate    acetaminophen  650 mg Oral Q6H PRN Shoshana Santosek, GERTRUDENP      aspirin  81 mg Oral Daily Shoshana Pardeepcsek, GERTRUDENP      atorvastatin  80 mg Oral Daily With Dinner LAUREN Monreal      bisacodyl  10 mg Rectal Daily PRN Roger Kelly, DO      bumetanide  2 mg Intravenous TID LAUREN Monreal      heparin (porcine)  5,000 Units Subcutaneous Q8H Count includes the [facility] LAUREN Khanna      hydrALAZINE  10 mg Oral Q8H ANDRESSA Nick Moyer, DO      isosorbide dinitrate  10 mg Oral TID after meals Nick Moyer,       ondansetron  4 mg Intravenous Q6H PRN LAUREN Monreal      oxyCODONE  2.5 mg Oral Q4H PRN Rosa Espinoza PA-C      Or    oxyCODONE  5 mg Oral Q4H PRN Rosa Espinoza PA-C      oxymetazoline  2 spray Each Nare Q12H PRN Rosa Espinoza PA-C      polyethylene glycol  17 g Oral Daily Franca Skinner MD      predniSONE  10 mg Oral Early Morning Shoshana Santosek, LAUREN      predniSONE  5 mg Oral HS Shoshana Baker, LAUREN      senna-docusate sodium  1 tablet Oral BID Shoshana Santosek, LAUREN      ticagrelor  90 mg Oral Q12H ANDRESSA LAUREN Monreal      trimethobenzamide  200 mg Intramuscular Q6H PRN Shoshana  "LAUERN Baker          acetaminophen, 650 mg, Q6H PRN  bisacodyl, 10 mg, Daily PRN  ondansetron, 4 mg, Q6H PRN  oxyCODONE, 2.5 mg, Q4H PRN   Or  oxyCODONE, 5 mg, Q4H PRN  oxymetazoline, 2 spray, Q12H PRN  trimethobenzamide, 200 mg, Q6H PRN        Portions of the record may have been created with voice recognition software.  Occasional wrong word or \"sound a like\" substitutions may have occurred due to the inherent limitations of voice recognition software.  Read the chart carefully and recognize, using context, where substitutions have occurred.    ==  Franca Skinner MD  [facility]  Internal Medicine Residency PGY-3    "

## 2025-03-09 NOTE — ASSESSMENT & PLAN NOTE
Secondary to prior CAD with mediation noncompliance and new ischemic disease resulting in cardiogenic shock    Relevant echocardiograms  3/2/25 TTE: LV cavity size is dilated. Apical aneurysmal dilatation. No apical thrombus. LVEF 15-20%. Systolic function is severely reduced. Severe global hypokinesis with apical aneurysmal dilatation and akinesis of the mid to apical anterior region. Thinning and akinesia in the basal to mid inferior consistent with transmural myocardial infarction. Diastolic function is severely abnormal, consistent with ungraded restrictive relaxation.      Plan:   Continue inotropic support;   Hydralazine  Previously required milrinone    Afterload reduction:   Isordil 10mg TID   Diuresis as tolerated  Bumex 2mg TID  Obtain follow up echocardiogram tomorrow to evaluate for improvement   Daily weights  Cardiac diet, 2L fluid restriction

## 2025-03-09 NOTE — ASSESSMENT & PLAN NOTE
Secondary to prior CAD with mediation noncompliance and new ischemic disease resulting in cardiogenic shock    Relevant echocardiograms  3/2/25 TTE: LV cavity size is dilated. Apical aneurysmal dilatation. No apical thrombus. LVEF 15-20%. Systolic function is severely reduced. Severe global hypokinesis with apical aneurysmal dilatation and akinesis of the mid to apical anterior region. Thinning and akinesia in the basal to mid inferior consistent with transmural myocardial infarction. Diastolic function is severely abnormal, consistent with ungraded restrictive relaxation.     Plan:   Continue inotropic support;   Milrinone 0.13 mcg/kg/min  Attempt to discontinue today    Afterload reduction:   Isordil 10mg TID   Diuresis as tolerated  Bumex 2mg TID  Obtain follow up echocardiogram tomorrow to evaluate for improvement   Daily weights  Cardiac diet, 2L fluid restriction

## 2025-03-09 NOTE — ASSESSMENT & PLAN NOTE
Multifactorial from cardiogenic shock, contrast exposures, possible underlying renal disease given history of Wegener's granulomatosis   Baseline creatinine: Unknown  Initial creatinine: 1.65 (3/2/25)   Last creatinine: 1.87 (3/8/25)      Plan:   Strict q4h I/O monitoring  Monitor UOP without valencia catheter   Continue to follow renal function tests

## 2025-03-09 NOTE — ASSESSMENT & PLAN NOTE
3/2/25: Presented to [facility] with chest pain, diaphoresis, nausea. EKG without obvious acute ischemic changes. Bedside TTE revealed dilated cardiomyopathy and patient with evidence of cardiogenic shock. Transferred to [facility] for heart failure management.   3/3/35 - 3/7/35: Ongoing inotropic support with diuresis to optimize for LHC   3/7/25 Impella-assisted LHC: LVEDP 25-30 mmHg    Plan:  Await hopeful recovery following coronary revascularization   Not a surgical or MCA candidate  Continue inotropic support;   Milrinone 0.25 mcg/kg/min   Wean as able based on SvO2, clinical exam, and endpoints   Diuresis as tolerated  Bumex 2mg TID  Appreciate Heart Failure cardiology recommendations   See plan below

## 2025-03-09 NOTE — PLAN OF CARE
Problem: CARDIOVASCULAR - ADULT  Goal: Absence of cardiac dysrhythmias or at baseline rhythm  Description: INTERVENTIONS:  - Continuous cardiac monitoring, vital signs, obtain 12 lead EKG if ordered  - Administer antiarrhythmic and heart rate control medications as ordered  - Monitor electrolytes and administer replacement therapy as ordered  Outcome: Progressing     Problem: Prexisting or High Potential for Compromised Skin Integrity  Goal: Skin integrity is maintained or improved  Description: INTERVENTIONS:  - Identify patients at risk for skin breakdown  - Assess and monitor skin integrity  - Assess and monitor nutrition and hydration status  - Monitor labs   - Assess for incontinence   - Turn and reposition patient  - Assist with mobility/ambulation  - Relieve pressure over bony prominences  - Avoid friction and shearing  - Provide appropriate hygiene as needed including keeping skin clean and dry  - Evaluate need for skin moisturizer/barrier cream  - Collaborate with interdisciplinary team   - Patient/family teaching  - Consider wound care consult   Outcome: Progressing     Problem: PAIN - ADULT  Goal: Verbalizes/displays adequate comfort level or baseline comfort level  Description: Interventions:  - Encourage patient to monitor pain and request assistance  - Assess pain using appropriate pain scale  - Administer analgesics based on type and severity of pain and evaluate response  - Implement non-pharmacological measures as appropriate and evaluate response  - Consider cultural and social influences on pain and pain management  - Notify physician/advanced practitioner if interventions unsuccessful or patient reports new pain  Outcome: Progressing     Problem: SAFETY ADULT  Goal: Patient will remain free of falls  Description: INTERVENTIONS:  - Educate patient/family on patient safety including physical limitations  - Instruct patient to call for assistance with activity   - Consult OT/PT to assist with  strengthening/mobility   - Keep Call bell within reach  - Keep bed low and locked with side rails adjusted as appropriate  - Keep care items and personal belongings within reach  - Initiate and maintain comfort rounds  - Make Fall Risk Sign visible to staff  - Offer Toileting every 2 Hours, in advance of need  - Initiate/Maintain bed/chair alarm  - Obtain necessary fall risk management equipment:   - Apply yellow socks and bracelet for high fall risk patients  - Consider moving patient to room near nurses station  Outcome: Progressing  Goal: Maintain or return to baseline ADL function  Description: INTERVENTIONS:  -  Assess patient's ability to carry out ADLs; assess patient's baseline for ADL function and identify physical deficits which impact ability to perform ADLs (bathing, care of mouth/teeth, toileting, grooming, dressing, etc.)  - Assess/evaluate cause of self-care deficits   - Assess range of motion  - Assess patient's mobility; develop plan if impaired  - Assess patient's need for assistive devices and provide as appropriate  - Encourage maximum independence but intervene and supervise when necessary  - Involve family in performance of ADLs  - Assess for home care needs following discharge   - Consider OT consult to assist with ADL evaluation and planning for discharge  - Provide patient education as appropriate  Outcome: Progressing  Goal: Maintains/Returns to pre admission functional level  Description: INTERVENTIONS:  - Perform AM-PAC 6 Click Basic Mobility/ Daily Activity assessment daily.  - Set and communicate daily mobility goal to care team and patient/family/caregiver.   - Collaborate with rehabilitation services on mobility goals if consulted  - Perform Range of Motion 4 times a day.  - Reposition patient every 2 hours.  - Dangle patient 4 times a day  - Stand patient 4 times a day  - Ambulate patient 4 times a day  - Out of bed to chair 3 times a day   - Out of bed for meals 3 times a day  - Out  of bed for toileting  - Record patient progress and toleration of activity level   Outcome: Progressing

## 2025-03-09 NOTE — PLAN OF CARE
Problem: PHYSICAL THERAPY ADULT  Goal: Performs mobility at highest level of function for planned discharge setting.  See evaluation for individualized goals.  Description: Treatment/Interventions: Functional transfer training, LE strengthening/ROM, Elevations, Therapeutic exercise, Endurance training, Equipment eval/education, Bed mobility, Gait training, Spoke to nursing, Spoke to case management, OT          See flowsheet documentation for full assessment, interventions and recommendations.  Outcome: Progressing  Note: Prognosis: Good  Problem List: Decreased strength, Decreased endurance, Impaired balance, Decreased mobility  Assessment: Patient see for PT treatment session this date. Patient eager to participate with PT however continues to report right knee pain. No increase in R knee pain with ambulation. Patient with noted improved activity tolerance during today's session as evidence with increased ambulation distance. Patient does continue to require standing rest  break secondary to fatigue. Patient is making noted progress on his functional goals. Patient would benefit from continued skilled physical therapy to return to prior level of function. Recommend no post acute rehabilitation needs upon discharge.        Rehab Resource Intensity Level, PT: No post-acute rehabilitation needs    See flowsheet documentation for full assessment.

## 2025-03-09 NOTE — PROGRESS NOTES
Progress Note - Critical Care/ICU   Name: Rodney Pizarro 60 y.o. male I MRN: [id number]  Unit/Bed#: PPHP-312-01 I Date of Admission: 3/2/2025   Date of Service: 3/9/2025 I Hospital Day: 7      Critical Care Interval Transfer Note:    Brief Hospital Summary:    60-year-old male presented to [facility] emergency department on 3/2 with complaints of chest pain.  Mentally hypotensive with mild troponin elevation.  Echocardiogram was performed which revealed an EF of 15 to 20% which is new for the patient.  Decision was made to transfer the patient to [facility] cardiac ICU.  He was hypotensive and initiated on Levophed infusion.  He underwent urgent cardiac catheterization which revealed 100% proximal LAD occlusion at the site of the previous stenting and 90% ostial lesion of the left circumflex.  No intervention was performed at that time.  He would be very high risk for surgical revascularization given his mitral and aortic valve disease as well.  He was initiated on milrinone infusion as well as Bumex infusion with improvement of his hemodynamics.  He was seen in consultation by heart failure.  He ultimately underwent assisted PCI of the circumflex on 3/7.  Since then heart failure has been initiating GDMT and weaning his milrinone infusion.  He is not currently a transplant candidate or LVAD candidate secondary to tobacco use and no health insurance.  Patient is diuresing well on intermittent Bumex bolusing and is stable for transfer to [facility]/telemetry.    Barriers to discharge:   Titration of GDMT     Consults: IP CONSULT TO CARDIOLOGY  IP CONSULT TO CARDIOTHORACIC SURGERY  IP CONSULT TO PALLIATIVE CARE    Recommended to review admission imaging for incidental findings and document in discharge navigator: Chart reviewed, no known incidental findings noted at this time.      Discharge Plan: Anticipate discharge in 48 hrs to home.  Central access plan:  Maintain for SvO2 monitoring     Patient seen  and evaluated by Critical Care today and deemed to be appropriate for transfer to Med Surg with Telemetry. Spoke to Dr. Skinner from [place] to accept transfer. Critical care can be contacted via SecureChat with any questions or concerns. Please use the Critical Care AP Role in Secure Chat for any provider inquires until the patient is transferred out of the ICU or until tomorrow at 0600.

## 2025-03-09 NOTE — PHYSICAL THERAPY NOTE
PHYSICAL THERAPY NOTE          Patient Name: Rodney Pizarro  Today's Date: 3/9/2025       03/09/25 0908   PT Last Visit   PT Visit Date 03/09/25   Note Type   Note Type Treatment   Pain Assessment   Pain Assessment Tool 0-10   Pain Score 5   Pain Location/Orientation Location: Generalized   Hospital Pain Intervention(s) Ambulation/increased activity;Repositioned   Restrictions/Precautions   Weight Bearing Precautions Per Order No   Other Precautions Cardiac/sternal;Chair Alarm;Multiple lines;Telemetry;Pain   General   Chart Reviewed Yes   Response to Previous Treatment Patient with no complaints from previous session.   Family/Caregiver Present No   Cognition   Orientation Level Oriented X4   Memory Within functional limits   Following Commands Follows one step commands without difficulty   Subjective   Subjective Patient eager to work with PT and go for a walk   Bed Mobility   Additional Comments Pt found OOB in bedside recliner. Returned at end of session with all needs within reach and chair alarm on.   Transfers   Sit to Stand 5  Supervision   Additional items Increased time required   Stand to Sit 5  Supervision   Additional items Increased time required   Additional Comments w/ RW   Ambulation/Elevation   Gait pattern Decreased foot clearance;Forward Flexion;Inconsistent eldon;Step through pattern   Gait Assistance 4  Minimal assist   Additional items Assist x 1;Verbal cues   Assistive Device Rolling walker   Distance 300'   Ambulation/Elevation Additional Comments 5 standing rest breaks   Balance   Static Sitting Fair +   Dynamic Sitting Fair   Static Standing Fair -   Dynamic Standing Fair -   Ambulatory Poor +   Activity Tolerance   Activity Tolerance Patient tolerated treatment well   Nurse Made Aware RN cleared and updated   Exercises   Knee AROM Long Arc Quad Sitting;20 reps;Bilateral   Ankle Pumps Sitting;20  reps;Bilateral   Assessment   Prognosis Good   Problem List Decreased strength;Decreased endurance;Impaired balance;Decreased mobility   Assessment Patient see for PT treatment session this date. Patient eager to participate with PT however continues to report right knee pain. No increase in R knee pain with ambulation. Patient with noted improved activity tolerance during today's session as evidence with increased ambulation distance. Patient does continue to require standing rest  break secondary to fatigue. Patient is making noted progress on his functional goals. Patient would benefit from continued skilled physical therapy to return to prior level of function. Recommend no post acute rehabilitation needs upon discharge.   Goals   Patient Goals To go for a walk   STG Expiration Date 03/15/25   PT Treatment Day 1   Plan   Treatment/Interventions Functional transfer training;LE strengthening/ROM;Elevations;Therapeutic exercise;Endurance training;Equipment eval/education;Bed mobility;Gait training;Spoke to nursing;Spoke to case management;OT   PT Frequency 3-5x/wk   Discharge Recommendation   Rehab Resource Intensity Level, PT No post-acute rehabilitation needs   AM-PAC Basic Mobility Inpatient   Turning in Flat Bed Without Bedrails 3   Lying on Back to Sitting on Edge of Flat Bed Without Bedrails 3   Moving Bed to Chair 3   Standing Up From Chair Using Arms 3   Walk in Room 3   Climb 3-5 Stairs With Railing 3   Basic Mobility Inpatient Raw Score 18   Basic Mobility Standardized Score 41.05   [facility] Highest Level Of Mobility   -HLM Goal 6: Walk 10 steps or more   -HLM Achieved 8: Walk 250 feet ot more         Eun Zuñiga, PT

## 2025-03-09 NOTE — PROGRESS NOTES
Heart Failure - Progress Note   Rodney Pizarro 60 y.o. male MRN: [id number]  Unit/Bed#: PPHP-312-01 Encounter: [id number]    Assessment and Plan      60 year old male with ischemic cardiomyopathy with stage D HF EF 15-20% not candidate for LVAD/OHT, MI in 2011 and 2014 s/p PCI, history of granulomatosis with polyangiitis with renal involvement, severe MR, moderate AI. Presented as unstable angina with initial cath showing proximal LAD occluded at site ofprior stent, considered high risk for PCI. Underwent repeat cath with previously occluded pLAD now open (likely thrombus) and s/p PCI to os Lcx (impella assist) with elevated LVEDP.     Past 24 hours: Switched to milrinone 0.13 yesterday around 9am, Did receive all 3 scheduled doses of bumex and 2 doses of isordil.  Relevant imaging from past 24 hours: EKG from yesterday with sinus tachycardia with ventricular rate of around 100.   Labs Vitals (past 24 hours) I/Os (past 24 hours) Weights   Recent Labs     03/08/25  0346 03/09/25  0407   CREATININE 1.87* 1.62*   EGFR 38 45   CO2 33* 32   K 3.6 3.7   MG 2.0 2.0   SODIUM 131* 134*   BUN 41* 38*   HGB 14.0 14.4    293   Unclear renal function baseline but has been between 1.6 to 2.0 this hospitalization.    Temp:  [97.8 °F (36.6 °C)-99.9 °F (37.7 °C)] 98.9 °F (37.2 °C)  HR:  [104-120] 109  BP: ()/(67-85) 105/76  Resp:  [20-41] 22  SpO2:  [94 %-98 %] 97 %  Systolics in ranges of 90-110s, HR in ranges of 100-110. On room air    Tele: sinus rhythm with rate around , intermittent PVCs   Intake/Output Summary (Last 24 hours) at 3/9/2025 0715  Last data filed at 3/9/2025 0600  Gross per 24 hour   Intake 1017.63 ml   Output 3025 ml   Net -2007.37 ml    Weight (last 2 days)       Date/Time Weight    03/09/25 0543 73.6 (162.26)    03/08/25 0900 74.3 (163.8)    03/08/25 0533 74.3 (163.8)    03/07/25 0600 78 (171.96)           Drips & Hemodynamics:  Milrinone: 0.13      Plan  -Milrinone weaned to 0.13 yesterday  and SVR reduction with isordil 10 mg TID started yesterday. Consider adding hydralazine 5 mg TID today and continue attempts at weaning milrinone.   -Agree with diuresis plan as per critical care team, currently on bumex IV 2 mg TID, would aim for net negative of 2 L in next 24 hours. Can use diamox if needed to meet this goal (his CO2 has been in mid 30s recently too)  -Continue on DAPT with aspirin and brilinta, high intensity statin  -Continue telemetry monitoring. Monitor and correct electrolyte derangements.  -Heart failure service will continue to follow.    Assessment & Plan  Cardiogenic shock (HCC)  In the setting of ischemic cardiomyopathy and decompensated heart failure  SCAI stage A/B  Currently on milrinone 0.25, SVO 2 of 52.3, hemoglobin of 14  Stefani: CO: 3.3, Cardiac Index: 1.8  Warm and euvolemic  MAP of 82  LFTs improved, kidney function stable  I/O last 24 hours: I 1.1L, O -2.7L, I/O -1.6, Net -10.1L  No significant events noted on telemetry  No further fevers    Plan:  S/p high risk PCI to ostial LCx with Impella support on 3/7.  Awaiting hopeful recovery following revascularization.  Overall volume status is improving, and doing well on 0.25 of milrinone, continue.    Continue Bumex 2 mg 3 times daily, aim for net -500 cc. May consider decreasing.   Amiodarone discontinued 3/7.  Monitor I's and O's and renal function.  Monitor electrolytes and replete as needed.  Telemetry monitoring.  Obtain repeat echo next week to evaluate for improvement.  Not currently a candidate for OHT/LVAD.  Not a surgical candidate.  Ischemic dilated cardiomyopathy (HFrEF 20%) due to coronary artery disease  See plan above.   Severe mitral regurgitation  Likely functional in the setting of dilated cardiomyopathy  Moderate aortic insufficiency  Also noted to have moderate AI and low-flow low gradient AS  Coronary artery disease involving native coronary artery of native heart with unstable angina pectoris (HCC)  Has prior  history of MI in 2011 and 2014 s/p PCI to LAD and RCA  Presented with chest pain diaphoresis and elevated troponin  EKG did not reveal ST elevations, did have inferior Q waves and right bundle branch block    Left heart cath 3/2 revealed 100% proximal RCA lesion, 100% LAD lesion and 90% ostial left circumflex lesion.     S/p high risk PCI to ostial LCx with Impella support on 3/8.  Cath findings also showed 60% ostial LAD lesion and 40% left main lesion.    Continue aspirin and Brilinta  Continue Atorvastatin  Continue management of heart failure and shock as above.     Acute kidney injury (HCC)  Monitor BMP.  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)  On prednisone for this    Case discussed and reviewed, Please wait for final recommendations from Dr. Moyer. Thank you for involving us in the care of this patient.    Subjective     Patient seen and examined.      Objective     Physical Exam  Vitals reviewed.   Constitutional:       Comments: Seen sitting up in chair   Cardiovascular:      Rate and Rhythm: Regular rhythm. Tachycardia present.      Pulses: Normal pulses.      Heart sounds: Normal heart sounds.      Comments: No edema in lower extremities, extremities warm to touch.   Pulmonary:      Effort: Pulmonary effort is normal.      Comments: Mild crackles heard in left lower lung field  Skin:     General: Skin is warm.      Capillary Refill: Capillary refill takes less than 2 seconds.   Neurological:      Mental Status: He is alert.         Vitals:  Temp:  [97.8 °F (36.6 °C)-99.9 °F (37.7 °C)] 98.9 °F (37.2 °C)  HR:  [104-120] 109  BP: ()/(67-85) 105/76  Resp:  [20-41] 22  SpO2:  [94 %-98 %] 97 %  Orthostatic Blood Pressures      Flowsheet Row Most Recent Value   Blood Pressure 105/76 filed at 03/09/2025 0600   Patient Position - Orthostatic VS Lying filed at 03/08/2025 0400            Cardiac Devices, Imaging & Monitoring     Telemetry:   Personally reviewed by Padmini Yung MD:  sinus rhythm with rate around , intermittent PVCs    Results for orders placed during the hospital encounter of 03/02/25    Echo complete w/ contrast if indicated    Interpretation Summary    Left Ventricle: Left ventricular cavity size is dilated with LVEDD of 6.5 cm. 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 No apical thrombus visualized. The left ventricular ejection fraction is 15-20%. Systolic function is severely reduced. There is severe global hypokinesis with apical aneurysmal dilatation and akinesis of the mid to apical anterior region. There is thinning and akinesia in the basal to mid inferior consistent with transmural myocardial infarction. Diastolic function is severely abnormal, consistent with Grade III restrictive diastolic dysfunction.    IVS: There is abnormal septal motion of unclear etiology.    Right Ventricle: Systolic function is moderately reduced.    Left Atrium: The atrium is severely dilated.    Aortic Valve: The aortic valve is trileaflet. The leaflets are severely thickened. The leaflets are severely calcified. There is severely reduced mobility. Low flow, low gradient aortic stenosis cannot be excluded. There is moderate regurgitation.    Mitral Valve: There is moderate annular calcification. There is moderate to severe regurgitation with an eccentrically anteriorly directed jet.    Aorta: The aortic root is mildly dilated. The ascending aorta is normal in size.    Padmini Yung MD  Cardiovascular Disease Fellow, FY-1

## 2025-03-09 NOTE — ASSESSMENT & PLAN NOTE
3/2/25: Presented to [facility] with chest pain, diaphoresis, nausea. EKG without obvious acute ischemic changes. Bedside TTE revealed dilated cardiomyopathy and patient with evidence of cardiogenic shock. Transferred to [facility] for heart failure management.   3/3/35 - 3/7/35: Ongoing inotropic support with diuresis to optimize for LHC   3/7/25 Impella-assisted LHC: LVEDP 25-30 mmHg    Plan:  Await hopeful recovery following coronary revascularization   Not a surgical or MCA candidate  Continue inotropic support;   Milrinone 0.13 mcg/kg/min   Discontinue able based on SvO2, clinical exam, and endpoints   Diuresis as tolerated  Bumex 2mg TID  Appreciate Heart Failure cardiology recommendations   See plan below

## 2025-03-10 ENCOUNTER — PATIENT OUTREACH (OUTPATIENT)
Dept: CARDIOLOGY CLINIC | Facility: CLINIC | Age: 60
End: 2025-03-10

## 2025-03-10 PROBLEM — M10.9 GOUT: Status: ACTIVE | Noted: 2025-01-01

## 2025-03-10 LAB
ANION GAP SERPL CALCULATED.3IONS-SCNC: 10 MMOL/L (ref 4–13)
ANION GAP SERPL CALCULATED.3IONS-SCNC: 12 MMOL/L (ref 4–13)
BASE EX.OXY STD BLDV CALC-SCNC: 47.8 % (ref 60–80)
BASE EX.OXY STD BLDV CALC-SCNC: 62.5 % (ref 60–80)
BASE EXCESS BLDV CALC-SCNC: -2.9 MMOL/L
BASE EXCESS BLDV CALC-SCNC: 2.7 MMOL/L
BUN SERPL-MCNC: 44 MG/DL (ref 5–25)
BUN SERPL-MCNC: 50 MG/DL (ref 5–25)
CALCIUM SERPL-MCNC: 8.8 MG/DL (ref 8.4–10.2)
CALCIUM SERPL-MCNC: 9.1 MG/DL (ref 8.4–10.2)
CHLORIDE SERPL-SCNC: 89 MMOL/L (ref 96–108)
CHLORIDE SERPL-SCNC: 90 MMOL/L (ref 96–108)
CO2 SERPL-SCNC: 31 MMOL/L (ref 21–32)
CO2 SERPL-SCNC: 32 MMOL/L (ref 21–32)
CREAT SERPL-MCNC: 1.99 MG/DL (ref 0.6–1.3)
CREAT SERPL-MCNC: 2.04 MG/DL (ref 0.6–1.3)
ERYTHROCYTE [DISTWIDTH] IN BLOOD BY AUTOMATED COUNT: 13.2 % (ref 11.6–15.1)
GFR SERPL CREATININE-BSD FRML MDRD: 34 ML/MIN/1.73SQ M
GFR SERPL CREATININE-BSD FRML MDRD: 35 ML/MIN/1.73SQ M
GLUCOSE SERPL-MCNC: 132 MG/DL (ref 65–140)
GLUCOSE SERPL-MCNC: 136 MG/DL (ref 65–140)
HCO3 BLDV-SCNC: 21.9 MMOL/L (ref 24–30)
HCO3 BLDV-SCNC: 27.9 MMOL/L (ref 24–30)
HCT VFR BLD AUTO: 45.8 % (ref 36.5–49.3)
HGB BLD-MCNC: 14.8 G/DL (ref 12–17)
MAGNESIUM SERPL-MCNC: 2.3 MG/DL (ref 1.9–2.7)
MCH RBC QN AUTO: 29.4 PG (ref 26.8–34.3)
MCHC RBC AUTO-ENTMCNC: 32.3 G/DL (ref 31.4–37.4)
MCV RBC AUTO: 91 FL (ref 82–98)
NASAL CANNULA: 2
O2 CT BLDV-SCNC: 13.1 ML/DL
O2 CT BLDV-SCNC: 9.2 ML/DL
PCO2 BLDV: 38.3 MM HG (ref 42–50)
PCO2 BLDV: 45.2 MM HG (ref 42–50)
PH BLDV: 7.38 [PH] (ref 7.3–7.4)
PH BLDV: 7.41 [PH] (ref 7.3–7.4)
PLATELET # BLD AUTO: 333 THOUSANDS/UL (ref 149–390)
PMV BLD AUTO: 10.7 FL (ref 8.9–12.7)
PO2 BLDV: 28.7 MM HG (ref 35–45)
PO2 BLDV: 36 MM HG (ref 35–45)
POTASSIUM SERPL-SCNC: 4.2 MMOL/L (ref 3.5–5.3)
POTASSIUM SERPL-SCNC: 4.7 MMOL/L (ref 3.5–5.3)
RBC # BLD AUTO: 5.03 MILLION/UL (ref 3.88–5.62)
SODIUM SERPL-SCNC: 130 MMOL/L (ref 135–147)
SODIUM SERPL-SCNC: 134 MMOL/L (ref 135–147)
URATE SERPL-MCNC: 14.9 MG/DL (ref 3.5–8.5)
WBC # BLD AUTO: 12.25 THOUSAND/UL (ref 4.31–10.16)

## 2025-03-10 PROCEDURE — 85027 COMPLETE CBC AUTOMATED: CPT | Performed by: PHYSICIAN ASSISTANT

## 2025-03-10 PROCEDURE — 82805 BLOOD GASES W/O2 SATURATION: CPT | Performed by: STUDENT IN AN ORGANIZED HEALTH CARE EDUCATION/TRAINING PROGRAM

## 2025-03-10 PROCEDURE — 97116 GAIT TRAINING THERAPY: CPT

## 2025-03-10 PROCEDURE — 99232 SBSQ HOSP IP/OBS MODERATE 35: CPT | Performed by: STUDENT IN AN ORGANIZED HEALTH CARE EDUCATION/TRAINING PROGRAM

## 2025-03-10 PROCEDURE — 97530 THERAPEUTIC ACTIVITIES: CPT

## 2025-03-10 PROCEDURE — 82805 BLOOD GASES W/O2 SATURATION: CPT | Performed by: PHYSICIAN ASSISTANT

## 2025-03-10 PROCEDURE — 84550 ASSAY OF BLOOD/URIC ACID: CPT | Performed by: STUDENT IN AN ORGANIZED HEALTH CARE EDUCATION/TRAINING PROGRAM

## 2025-03-10 PROCEDURE — 83735 ASSAY OF MAGNESIUM: CPT | Performed by: PHYSICIAN ASSISTANT

## 2025-03-10 PROCEDURE — 80048 BASIC METABOLIC PNL TOTAL CA: CPT | Performed by: PHYSICIAN ASSISTANT

## 2025-03-10 PROCEDURE — 99232 SBSQ HOSP IP/OBS MODERATE 35: CPT | Performed by: INTERNAL MEDICINE

## 2025-03-10 PROCEDURE — 80048 BASIC METABOLIC PNL TOTAL CA: CPT | Performed by: STUDENT IN AN ORGANIZED HEALTH CARE EDUCATION/TRAINING PROGRAM

## 2025-03-10 RX ORDER — ECHINACEA PURPUREA EXTRACT 125 MG
1 TABLET ORAL
Status: DISCONTINUED | OUTPATIENT
Start: 2025-03-10 | End: 2025-03-11 | Stop reason: HOSPADM

## 2025-03-10 RX ORDER — HYDRALAZINE HYDROCHLORIDE 10 MG/1
20 TABLET, FILM COATED ORAL EVERY 8 HOURS SCHEDULED
Status: DISCONTINUED | OUTPATIENT
Start: 2025-03-10 | End: 2025-03-11 | Stop reason: HOSPADM

## 2025-03-10 RX ORDER — ALLOPURINOL 100 MG/1
100 TABLET ORAL DAILY
Status: DISCONTINUED | OUTPATIENT
Start: 2025-03-10 | End: 2025-03-10

## 2025-03-10 RX ORDER — ALLOPURINOL 100 MG/1
50 TABLET ORAL DAILY
Status: DISCONTINUED | OUTPATIENT
Start: 2025-03-11 | End: 2025-03-11 | Stop reason: HOSPADM

## 2025-03-10 RX ORDER — COLCHICINE 0.6 MG/1
0.6 TABLET ORAL DAILY
Status: DISCONTINUED | OUTPATIENT
Start: 2025-03-10 | End: 2025-03-11 | Stop reason: HOSPADM

## 2025-03-10 RX ORDER — HYDRALAZINE HYDROCHLORIDE 10 MG/1
10 TABLET, FILM COATED ORAL ONCE
Status: COMPLETED | OUTPATIENT
Start: 2025-03-10 | End: 2025-03-10

## 2025-03-10 RX ADMIN — OXYMETAZOLINE HYDROCHLORIDE 2 SPRAY: 0.5 SPRAY NASAL at 21:31

## 2025-03-10 RX ADMIN — HYDRALAZINE HYDROCHLORIDE 10 MG: 10 TABLET ORAL at 05:05

## 2025-03-10 RX ADMIN — BUMETANIDE 3 MG: 1 TABLET ORAL at 14:51

## 2025-03-10 RX ADMIN — PREDNISONE 10 MG: 10 TABLET ORAL at 05:05

## 2025-03-10 RX ADMIN — HEPARIN SODIUM 5000 UNITS: 5000 INJECTION INTRAVENOUS; SUBCUTANEOUS at 21:26

## 2025-03-10 RX ADMIN — TICAGRELOR 90 MG: 90 TABLET ORAL at 08:48

## 2025-03-10 RX ADMIN — HEPARIN SODIUM 5000 UNITS: 5000 INJECTION INTRAVENOUS; SUBCUTANEOUS at 05:06

## 2025-03-10 RX ADMIN — ISOSORBIDE DINITRATE 10 MG: 10 TABLET ORAL at 11:58

## 2025-03-10 RX ADMIN — HEPARIN SODIUM 5000 UNITS: 5000 INJECTION INTRAVENOUS; SUBCUTANEOUS at 14:13

## 2025-03-10 RX ADMIN — COLCHICINE 0.6 MG: 0.6 TABLET ORAL at 14:13

## 2025-03-10 RX ADMIN — Medication 3 MG: at 21:23

## 2025-03-10 RX ADMIN — HYDRALAZINE HYDROCHLORIDE 10 MG: 10 TABLET ORAL at 14:51

## 2025-03-10 RX ADMIN — BUMETANIDE 3 MG: 1 TABLET ORAL at 21:24

## 2025-03-10 RX ADMIN — ATORVASTATIN CALCIUM 80 MG: 80 TABLET, FILM COATED ORAL at 17:38

## 2025-03-10 RX ADMIN — PREDNISONE 5 MG: 10 TABLET ORAL at 21:25

## 2025-03-10 RX ADMIN — HYDRALAZINE HYDROCHLORIDE 10 MG: 10 TABLET ORAL at 14:13

## 2025-03-10 RX ADMIN — TICAGRELOR 90 MG: 90 TABLET ORAL at 21:23

## 2025-03-10 RX ADMIN — ISOSORBIDE DINITRATE 10 MG: 10 TABLET ORAL at 17:38

## 2025-03-10 RX ADMIN — ASPIRIN 81 MG: 81 TABLET, CHEWABLE ORAL at 08:48

## 2025-03-10 RX ADMIN — HYDRALAZINE HYDROCHLORIDE 20 MG: 10 TABLET ORAL at 21:24

## 2025-03-10 RX ADMIN — SENNOSIDES AND DOCUSATE SODIUM 1 TABLET: 50; 8.6 TABLET ORAL at 08:48

## 2025-03-10 RX ADMIN — OXYCODONE HYDROCHLORIDE 5 MG: 5 TABLET ORAL at 05:05

## 2025-03-10 RX ADMIN — ISOSORBIDE DINITRATE 10 MG: 10 TABLET ORAL at 08:50

## 2025-03-10 NOTE — PLAN OF CARE
Problem: CARDIOVASCULAR - ADULT  Goal: Absence of cardiac dysrhythmias or at baseline rhythm  Description: INTERVENTIONS:  - Continuous cardiac monitoring, vital signs, obtain 12 lead EKG if ordered  - Administer antiarrhythmic and heart rate control medications as ordered  - Monitor electrolytes and administer replacement therapy as ordered  Outcome: Progressing     Problem: PAIN - ADULT  Goal: Verbalizes/displays adequate comfort level or baseline comfort level  Description: Interventions:  - Encourage patient to monitor pain and request assistance  - Assess pain using appropriate pain scale  - Administer analgesics based on type and severity of pain and evaluate response  - Implement non-pharmacological measures as appropriate and evaluate response  - Consider cultural and social influences on pain and pain management  - Notify physician/advanced practitioner if interventions unsuccessful or patient reports new pain  Outcome: Progressing

## 2025-03-10 NOTE — PROGRESS NOTES
Progress Note - Heart Failure   Name: Rodney Pizarro 60 y.o. male I MRN: [id number]  Unit/Bed#: [facility]-316-01 I Date of Admission: 3/2/2025   Date of Service: 3/10/2025 I Hospital Day: 8         60 year old male with ischemic cardiomyopathy with stage D HF EF 15-20% not candidate for LVAD/OHT, MI in 2011 and 2014 s/p PCI, history of granulomatosis with polyangiitis with renal involvement, severe MR, moderate AI. Presented as unstable angina with initial cath showing proximal LAD occluded at site of prior stent, considered high risk for PCI. Underwent repeat cath with previously occluded pLAD now open (likely thrombus) and s/p PCI to os Lcx (impella assist) with elevated LVEDP. Was on Milrinone. Has no insurance. Not candidate for transplant or LVAD at this time. Was weaned off Milrinone and transfer out of CCU. HF is following him for advance ischemic cardiomyopathy.    Assessment & Plan  Cardiogenic shock (HCC)  In the setting of ischemic cardiomyopathy and decompensated heart failure  SCAI stage A/B  Ischemic cardiomyopathy EF 20%.  Dilated LV.  Was on milrinone which now has been weaned off.  Coronary artery disease involving native coronary artery of native heart with unstable angina pectoris (HCC)  Has prior history of MI in 2011 and 2014 s/p PCI to LAD and RCA  Presented with chest pain diaphoresis and elevated troponin  EKG did not reveal ST elevations, did have inferior Q waves and right bundle branch block    Left heart cath 3/2 revealed 100% proximal RCA lesion, 100% LAD lesion and 90% ostial left circumflex lesion.     S/p high risk PCI to ostial LCx with Impella support on 3/8.  Cath findings also showed 60% ostial LAD lesion and 40% left main lesion.    Ischemic dilated cardiomyopathy (HFrEF 20%) due to coronary artery disease  See plan above.   Severe mitral regurgitation  Likely functional in the setting of dilated cardiomyopathy  Moderate aortic insufficiency  Also noted to have moderate AI and  low-flow low gradient AS  Acute kidney injury (HCC)  Monitor BMP.  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)  On prednisone for this  Palliative care encounter    Gout  Noted in his Thumb.   Management per primary team.      Plan:    Milrinone weaned off 3/9 morning.   Started on Isordil hydralazine for SVR reduction.  Sinus tach on telemetry although holding onto starting beta-blocker for now.  SEAN and drop in SvO2 noted.  Repeat labs ordered. Holding IV Bumex for now. Suspected gout in his thumb noted.  On steroids for his vasculitis.  On DAPT with aspirin and Brilinta for recent PCI.  Price check Brilinta.  Patient does not have insurance.  Not a candidate for transplant or LVAD at this time.  Rest of the care per primary team.        Subjective     Patient was seen and examined today.  Patient admits to walking in the room and outside without any difficulties today.  Clinically he looks good.  Warm extremities.  Sinus tach on telemetry. He does has likely gout in his thumb.   Objective     Vitals: Temp (24hrs), Av.9 °F (36.6 °C), Min:97.7 °F (36.5 °C), Max:98 °F (36.7 °C)  Current: Temperature: 98 °F (36.7 °C)  Patient Vitals for the past 24 hrs:   BP Temp Pulse Resp SpO2 Weight   03/10/25 1114 100/63 98 °F (36.7 °C) 100 18 97 % --   03/10/25 0848 96/66 -- (!) 114 -- 99 % --   03/10/25 0705 101/65 97.7 °F (36.5 °C) (!) 117 18 100 % --   03/10/25 0553 -- -- -- -- -- 73.7 kg (162 lb 7.7 oz)   03/10/25 0504 99/72 -- (!) 109 -- 94 % --   25 2300 -- -- (!) 113 18 99 % --   25 2250 -- -- (!) 114 -- 100 % --   25 109/78 -- (!) 111 -- 99 % --   25 -- -- -- -- 99 % --   03/09/25 2002 113/82 -- (!) 114 -- 98 % --   25 1600 93/71 -- (!) 109 (!) 35 98 % --   25 1500 105/76 -- (!) 107 (!) 31 98 % --   25 1445 99/79 -- -- -- -- --   25 1400 98/77 -- (!) 107 (!) 26 98 % --    Body mass index is 25.45 kg/m².  Physical Exam:  Physical Exam  Vitals  reviewed.   Constitutional:       General: He is not in acute distress.     Appearance: He is well-developed. He is not diaphoretic.   Cardiovascular:      Rate and Rhythm: Normal rate and regular rhythm.      Heart sounds: Normal heart sounds. No murmur heard.     No friction rub.   Pulmonary:      Effort: Pulmonary effort is normal. No respiratory distress.      Breath sounds: Normal breath sounds. No stridor. No wheezing.   Psychiatric:         Mood and Affect: Mood normal.         Behavior: Behavior normal.         Thought Content: Thought content normal.         Judgment: Judgment normal.         Invasive Devices       Central Venous Catheter Line  Duration             CVC Central Lines 03/02/25 Triple Right Internal jugular 7 days                        Labs:   Results from last 7 days   Lab Units 03/10/25  0440 03/09/25  0407 03/08/25  0346 03/07/25  0443 03/06/25  0432 03/05/25  0404 03/04/25  0202   WBC Thousand/uL 12.25* 10.44* 8.59 8.72 10.19* 11.68* 11.97*   HEMOGLOBIN g/dL 14.8 14.4 14.0 14.1 14.2 14.7 15.3   HEMATOCRIT % 45.8 43.6 42.7 43.5 43.7 45.0 46.8   PLATELETS Thousands/uL 333 293 270 275 249 239 246      Results from last 7 days   Lab Units 03/10/25  0440 03/09/25  0407 03/08/25  0346 03/07/25  1541 03/07/25  0443 03/06/25  1604 03/06/25  0432 03/05/25  1611 03/05/25  0403 03/05/25  0359 03/04/25  2349 03/04/25  1757 03/04/25  1135 03/04/25  0528 03/04/25  0202 03/04/25  0201 03/04/25  0008 03/03/25  1828   SODIUM mmol/L 134* 134* 131* 129* 131* 132* 133* 134* 136  --  135 135 137  --   --  139 139 139   POTASSIUM mmol/L 4.7 3.7 3.6 4.5 3.7 4.0 3.6 4.0 3.9  --  3.9 3.8 4.0  --   --  3.7 3.9 3.4*   CHLORIDE mmol/L 90* 91* 89* 90* 89* 91* 91* 91* 93*  --  92* 93* 94*  --   --  96 96 97   CO2 mmol/L 32 32 33* 30 34* 31 33* 36* 33*  --  33* 35* 34*  --   --  33* 33* 32   BUN mg/dL 44* 38* 41* 40* 42* 45* 38* 40* 37*  --  37* 35* 31*  --   --  24 24 24   CREATININE mg/dL 2.04* 1.62* 1.87* 2.03*  "1.98* 1.95* 1.89* 2.11* 1.91*  --  1.92* 2.08* 2.02*  --   --  1.85* 1.91* 1.79*   CALCIUM mg/dL 9.1 8.5 8.6 8.9 8.7 9.0 8.3* 8.5 8.6  --  8.6 8.7 9.2  --   --  8.8 9.1 9.1   ALK PHOS U/L  --   --   --   --  49  --  46  --  47  --   --   --   --   --  46  --   --  47   ALT U/L  --   --   --   --  56*  --  72*  --  100*  --   --   --   --   --  145*  --   --  147*   AST U/L  --   --   --   --  96*  --  169*  --  372*  --   --   --   --   --  876*  --   --  987*   MAGNESIUM mg/dL 2.3 2.0 2.0  --  1.8*  --  2.0  --  2.2  --  2.1 2.2 2.3  --   --  1.9 1.9 2.0   PHOSPHORUS mg/dL  --  3.5  --   --  4.4  --  4.3  --   --   --   --   --   --   --   --   --   --   --    PTT seconds  --   --   --   --  85*  --  83*  --   --  65*  --   --   --  67*  --   --   --   --    EGFR ml/min/1.73sq m 34 45 38 34 35 36 37 33 37  --  37 33 35  --   --  38 37 40     Results from last 7 days   Lab Units 03/07/25  0443 03/06/25  0432 03/05/25  0359 03/04/25  0528   PTT seconds 85* 83* 65* 67*     Results from last 7 days   Lab Units 03/04/25  1047   LACTIC ACID mmol/L 1.3         No results found for: \"PHART\", \"UTT6VAY\", \"PO2ART\", \"SBW0MIC\", \"S5JSTTOB\", \"BEART\", \"SOURCE\"  No components found for: \"HIV1X2\"  No results found for: \"HAV\", \"HEPAIGM\", \"HEPBIGM\", \"HEPBCAB\", \"HBEAG\", \"HEPCAB\"  No results found for: \"SPEP\", \"UPEP\"   Lab Results   Component Value Date    HGBA1C 5.7 (H) 03/03/2025     No results found for: \"CHOL\"   Lab Results   Component Value Date    HDL 55 03/03/2025      Lab Results   Component Value Date    LDLCALC 141 (H) 03/03/2025      Lab Results   Component Value Date    TRIG 100 03/03/2025     No components found for: \"PROCAL\"      Micro:  Results from last 7 days   Lab Units 03/04/25  1047   BLOOD CULTURE  No Growth After 5 Days.  No Growth After 5 Days.     Urinalysis:  No results found for: \"AMPHETUR\", \"BDZUR\", \"COCAINEUR\", \"OPIATEUR\", \"PCPUR\", \"THCUR\", \"ETOH\", \"ACTMNPHEN\", \"SALICYLATE\"       Invalid input(s): " "\"URIBILINOGEN\"        Intake and Outputs:  I/O         03/08 0701  03/09 0700 03/09 0701  03/10 0700 03/10 0701 03/11 0700    P.O. 940 118 500    I.V. (mL/kg) 80.7 (1.1) 9 (0.1)     IV Piggyback       Total Intake(mL/kg) 1020.7 (13.9) 127 (1.7) 500 (6.8)    Urine (mL/kg/hr) 3025 (1.7) 950 (0.5)     Total Output 3025 950     Net -2004.3 -823 +500                 Nutrition:  Diet Cardiovascular; Cardiac; Fluid Restriction 2000 ML  Radiology Results:   VAS limited iliac-femoral evaluation for Impella Device   Final Result by Ganga Porter MD (03/04 0931)      XR chest portable ICU   Final Result by Comfort Ledesma MD (03/03 0908)      No acute cardiopulmonary disease.            Workstation performed: [id number]           Scheduled Medications:  allopurinol, 100 mg, Daily  aspirin, 81 mg, Daily  atorvastatin, 80 mg, Daily With Dinner  [Held by provider] bumetanide, 2 mg, TID  colchicine, 0.6 mg, Daily  heparin (porcine), 5,000 Units, Q8H ANDRESSA  hydrALAZINE, 10 mg, Q8H ANDRESSA  isosorbide dinitrate, 10 mg, TID after meals  polyethylene glycol, 17 g, Daily  predniSONE, 10 mg, Early Morning  predniSONE, 5 mg, HS  senna-docusate sodium, 1 tablet, BID  ticagrelor, 90 mg, Q12H ANDRESSA      PRN MEDS:  acetaminophen, 650 mg, Q6H PRN  bisacodyl, 10 mg, Daily PRN  ondansetron, 4 mg, Q6H PRN  oxyCODONE, 2.5 mg, Q4H PRN   Or  oxyCODONE, 5 mg, Q4H PRN  oxymetazoline, 2 spray, Q12H PRN  trimethobenzamide, 200 mg, Q6H PRN      Last 24 Hour Meds: :   Medication Administration - last 24 hours from 03/09/2025 1359 to 03/10/2025 1359         Date/Time Order Dose Route Action Action by     03/10/2025 0848 EDT aspirin chewable tablet 81 mg 81 mg Oral Given Mayra Isael, LISA     03/09/2025 2128 EDT predniSONE tablet 5 mg 5 mg Oral Given Leyla Barry     03/10/2025 0505 EDT predniSONE tablet 10 mg 10 mg Oral Given Leyla Barry     03/09/2025 1621 EDT atorvastatin (LIPITOR) tablet 80 mg 80 mg Oral Given Mustapha Willingham, LISA     03/10/2025 " 0848 EDT senna-docusate sodium (SENOKOT S) 8.6-50 mg per tablet 1 tablet 1 tablet Oral Given Mayra Kirkland, LISA     03/09/2025 1759 EDT senna-docusate sodium (SENOKOT S) 8.6-50 mg per tablet 1 tablet 1 tablet Oral Given Mustapha Willingham RN     03/10/2025 0848 EDT ticagrelor (BRILINTA) tablet 90 mg 90 mg Oral Given Mayra Kirkland RN     03/09/2025 2128 EDT ticagrelor (BRILINTA) tablet 90 mg 90 mg Oral Given Leyla Barry     03/14/2025 2100 EDT bumetanide (BUMEX) injection 2 mg -- Intravenous Held Dose Bennie Zheng, DO     03/14/2025 1600 EDT bumetanide (BUMEX) injection 2 mg -- Intravenous Held Dose Bennie Zheng DO     03/14/2025 0900 EDT bumetanide (BUMEX) injection 2 mg -- Intravenous Held Dose Bennie Zheng DO     03/13/2025 2100 EDT bumetanide (BUMEX) injection 2 mg -- Intravenous Held Dose Bennie Zheng, DO     03/13/2025 1600 EDT bumetanide (BUMEX) injection 2 mg -- Intravenous Held Dose Bennie Zheng, DO     03/13/2025 0900 EDT bumetanide (BUMEX) injection 2 mg -- Intravenous Held Dose Bennie Zheng DO     03/12/2025 2100 EDT bumetanide (BUMEX) injection 2 mg -- Intravenous Held Dose Bennie Zheng, DO     03/12/2025 1600 EDT bumetanide (BUMEX) injection 2 mg -- Intravenous Held Dose Bennie Zheng, DO     03/12/2025 0900 EDT bumetanide (BUMEX) injection 2 mg -- Intravenous Held Dose Bennie Zheng DO     03/11/2025 2100 EDT bumetanide (BUMEX) injection 2 mg -- Intravenous Held Dose Bennie Zheng DO     03/11/2025 1600 EDT bumetanide (BUMEX) injection 2 mg -- Intravenous Held Dose Bennie Zheng DO     03/11/2025 0900 EDT bumetanide (BUMEX) injection 2 mg -- Intravenous Held Dose Bennie Zheng, DO     03/10/2025 2100 EDT bumetanide (BUMEX) injection 2 mg -- Intravenous Held Dose Bennie Zheng, DO     03/10/2025 1600 EDT bumetanide (BUMEX) injection 2 mg -- Intravenous Held Dose Bennie Zheng, DO     03/10/2025 0854 EDT bumetanide (BUMEX) injection 2 mg -- Intravenous Held by provider Bennie Zheng, DO     03/10/2025 0849  EDT bumetanide (BUMEX) injection 2 mg 2 mg Intravenous Not Given Mayra Kirkland RN     03/09/2025 2121 EDT bumetanide (BUMEX) injection 2 mg 2 mg Intravenous Given Leyla Barry     03/09/2025 1621 EDT bumetanide (BUMEX) injection 2 mg 2 mg Intravenous Given Mustapha Willingham RN     03/10/2025 0506 EDT heparin (porcine) subcutaneous injection 5,000 Units 5,000 Units Subcutaneous Given Leyla Barry     03/09/2025 2129 EDT heparin (porcine) subcutaneous injection 5,000 Units 5,000 Units Subcutaneous Given Leyla Barry     03/09/2025 1444 EDT heparin (porcine) subcutaneous injection 5,000 Units 5,000 Units Subcutaneous Given Mustapha Willingham RN     03/10/2025 0505 EDT oxyCODONE (ROXICODONE) split tablet 2.5 mg -- Oral See Alternative Leyla Barry     03/09/2025 1759 EDT oxyCODONE (ROXICODONE) split tablet 2.5 mg -- Oral See Alternative Mustapha Willingham RN     03/10/2025 0505 EDT oxyCODONE (ROXICODONE) IR tablet 5 mg 5 mg Oral Given Leyla Barry     03/09/2025 1759 EDT oxyCODONE (ROXICODONE) IR tablet 5 mg 5 mg Oral Given Mustapha Willingham RN     03/09/2025 1802 EDT oxymetazoline (AFRIN) 0.05 % nasal spray 2 spray 2 spray Each Nare Given Mustapha Willingham RN     03/10/2025 1158 EDT isosorbide dinitrate (ISORDIL) tablet 10 mg 10 mg Oral Given Indu Donaldson, LISA     03/10/2025 0850 EDT isosorbide dinitrate (ISORDIL) tablet 10 mg 10 mg Oral Given Mayra Kirkland RN     03/09/2025 1800 EDT isosorbide dinitrate (ISORDIL) tablet 10 mg 10 mg Oral Given Mustapha Willingham RN     03/10/2025 0505 EDT hydrALAZINE (APRESOLINE) tablet 10 mg 10 mg Oral Given Leyla Barry     03/09/2025 2129 EDT hydrALAZINE (APRESOLINE) tablet 10 mg 10 mg Oral Given Leyla Barry     03/09/2025 1445 EDT hydrALAZINE (APRESOLINE) tablet 10 mg 10 mg Oral Given Mustapha Willingham RN     03/10/2025 0849 EDT polyethylene glycol (MIRALAX) packet 17 g 17 g Oral Not Given Mayra Kirkland RN     03/09/2025 1621 EDT polyethylene glycol (MIRALAX) packet 17 g 17 g Oral Given Mustapha  LISA Willingham            PLEASE NOTE:  This encounter was completed utilizing the [employer] Direct Speech Voice Recognition Software. Grammatical errors, random word insertions, pronoun errors and incomplete sentences are occasional consequences of the system due to software limitations, ambient noise and hardware issues.These may be missed by proof reading prior to affixing electronic signature. Any questions or concerns about the content, text or information contained within the body of this dictation should be directly addressed to the physician for clarification. Please do not hesitate to call me directly if you have any any questions or concerns.

## 2025-03-10 NOTE — ASSESSMENT & PLAN NOTE
Has prior history of MI in 2011 and 2014 s/p PCI to LAD and RCA  Presented with chest pain diaphoresis and elevated troponin  EKG did not reveal ST elevations, did have inferior Q waves and right bundle branch block    Left heart cath 3/2 revealed 100% proximal RCA lesion, 100% LAD lesion and 90% ostial left circumflex lesion.     S/p high risk PCI to ostial LCx with Impella support on 3/8.  Cath findings also showed 60% ostial LAD lesion and 40% left main lesion.

## 2025-03-10 NOTE — PHYSICAL THERAPY NOTE
PHYSICAL THERAPY NOTE          Patient Name: Rodney Pizarro  Today's Date: 3/10/2025         03/10/25 0942   PT Last Visit   PT Visit Date 03/10/25   Note Type   Note Type Treatment   Pain Assessment   Pain Assessment Tool 0-10   Pain Score No Pain   Restrictions/Precautions   Other Precautions Cardiac/sternal;Telemetry;Chair Alarm   General   Chart Reviewed Yes   Additional Pertinent History cleared for Tx session by nsg   Response to Previous Treatment Patient with no complaints from previous session.   Cognition   Overall Cognitive Status WFL   Arousal/Participation Alert;Cooperative   Attention Within functional limits   Orientation Level Oriented to person;Oriented to place;Oriented to situation   Memory Within functional limits   Following Commands Follows all commands and directions without difficulty   Subjective   Subjective Alert; in the chair; agreeable to amb and try steps   Transfers   Sit to Stand 6  Modified independent   Stand to Sit 6  Modified independent   Ambulation/Elevation   Gait pattern Short stride  (no overt LOB, knee buckling or swaying)   Gait Assistance 6  Modified independent   Assistive Device None   Distance 150 ft + 2 x 30 ft + 160 ft w/ seated rest periods and steps negotiation in between   Stair Management Assistance 6  Modified independent   Additional items Verbal cues  (discussed sequencing)   Stair Management Technique One rail R;Step to pattern;Foreward;Backward;Nonreciprocal   Number of Stairs 6  (1 step x 6 trials)   Balance   Static Sitting Good   Static Standing Fair +   Ambulatory Fair   Activity Tolerance   Activity Tolerance Patient tolerated treatment well   Assessment   Assessment Pt demonstrated overall significant improvement in balance, endurance and all aspects of observed mobility progressing to mod (I) level on level surfaces (incl amb w/o AD) and on the steps; no overt uncorrected  LOB, gross knee buckling, or swaying observed during the session; no increased discomfort or excessive fatigue expressed; pt appeared to be comfortable at the end of session/reclined; overall, cont to anticipate pt will return home w/ available family support upon D/C from PT/mobility stand point and when medically cleared; no other immediate PT needs otherwise identified while in the hospital; pt informed and concurred; pt expressed no concerns about going home from mobility stand point, when medically cleared; will therefore sign off   Goals   Patient Goals to go home   PT Treatment Day 2   Plan   Treatment/Interventions Spoke to nursing;Spoke to case management   Progress Discontinue PT   PT Frequency Other (Comment)  (D/C PT)   Discharge Recommendation   Rehab Resource Intensity Level, PT No post-acute rehabilitation needs   AM-PAC Basic Mobility Inpatient   Turning in Flat Bed Without Bedrails 4   Lying on Back to Sitting on Edge of Flat Bed Without Bedrails 4   Moving Bed to Chair 4   Standing Up From Chair Using Arms 4   Walk in Room 4   Climb 3-5 Stairs With Railing 4   Basic Mobility Inpatient Raw Score 24   Basic Mobility Standardized Score 57.68   [facility] Highest Level Of Mobility   -HLM Goal 8: Walk 250 feet or more   JH-HLM Achieved 8: Walk 250 feet ot more   Education   Education Provided Mobility training   Patient Demonstrates verbal understanding   End of Consult   Patient Position at End of Consult Bedside chair;Bed/Chair alarm activated;All needs within reach     Donaldo Pate

## 2025-03-10 NOTE — PLAN OF CARE
Problem: PHYSICAL THERAPY ADULT  Goal: Performs mobility at highest level of function for planned discharge setting.  See evaluation for individualized goals.  Description: Treatment/Interventions: Functional transfer training, LE strengthening/ROM, Elevations, Therapeutic exercise, Endurance training, Equipment eval/education, Bed mobility, Gait training, Spoke to nursing, Spoke to case management, OT          See flowsheet documentation for full assessment, interventions and recommendations.  Outcome: Adequate for Discharge  Note: Prognosis: Good  Problem List: Decreased strength, Decreased endurance, Impaired balance, Decreased mobility  Assessment: Pt demonstrated overall significant improvement in balance, endurance and all aspects of observed mobility progressing to mod (I) level on level surfaces (incl amb w/o AD) and on the steps; no overt uncorrected LOB, gross knee buckling, or swaying observed during the session; no increased discomfort or excessive fatigue expressed; pt appeared to be comfortable at the end of session/reclined; overall, cont to anticipate pt will return home w/ available family support upon D/C from PT/mobility stand point and when medically cleared; no other immediate PT needs otherwise identified while in the hospital; pt informed and concurred; pt expressed no concerns about going home from mobility stand point, when medically cleared; will therefore sign off        Rehab Resource Intensity Level, PT: No post-acute rehabilitation needs    See flowsheet documentation for full assessment.

## 2025-03-10 NOTE — PROGRESS NOTES
Progress Note - Internal Medicine   Name: Rodney Pizarro 60 y.o. male I MRN: [id number]  Unit/Bed#: PPHP-316-01 I Date of Admission: 3/2/2025   Date of Service: 3/10/2025 I Hospital Day: 8     Assessment & Plan  Cardiogenic shock (HCC)  In the setting of ischemic cardiomyopathy and decompensated heart failure  SCAI stage A/B  Currently on milrinone 0.25, SVO 2 of 52.3, hemoglobin of 14  Stefani: CO: 3.3, Cardiac Index: 1.8  Warm and euvolemic  MAP of 82  LFTs improved, kidney function stable  I/O last 24 hours: I 1.1L, O -2.7L, I/O -1.6, Net -10.1L  No significant events noted on telemetry  No further fevers     Plan:  S/p high risk PCI to ostial LCx with Impella support on 3/7.  Awaiting hopeful recovery following revascularization.  Overall volume status is improving, and doing well on 0.25 of milrinone, continue.    Continue Bumex 2 mg 3 times daily, aim for net -500 cc. May consider decreasing.   Amiodarone discontinued 3/7.  Monitor I's and O's and renal function.  Monitor electrolytes and replete as needed.  Telemetry monitoring.  Obtain repeat echo next week to evaluate for improvement.  Not currently a candidate for OHT/LVAD.  Not a surgical candidate.  Ischemic dilated cardiomyopathy (HFrEF 20%) due to coronary artery disease  Secondary to prior CAD with mediation noncompliance and new ischemic disease resulting in cardiogenic shock    Relevant echocardiograms  3/2/25 TTE: LV cavity size is dilated. Apical aneurysmal dilatation. No apical thrombus. LVEF 15-20%. Systolic function is severely reduced. Severe global hypokinesis with apical aneurysmal dilatation and akinesis of the mid to apical anterior region. Thinning and akinesia in the basal to mid inferior consistent with transmural myocardial infarction. Diastolic function is severely abnormal, consistent with ungraded restrictive relaxation.      Plan:   Continue inotropic support;   Hydralazine  Previously required milrinone    Afterload reduction:    Isordil 10mg TID   Diuresis as tolerated  Bumex 2mg TID  Obtain follow up echocardiogram tomorrow to evaluate for improvement   Daily weights  Cardiac diet, 2L fluid restriction  Coronary artery disease involving native coronary artery of native heart with unstable angina pectoris (HCC)  Has prior history of MI in 2011 and 2014 s/p PCI to LAD and RCA  Presented with chest pain diaphoresis and elevated troponin  EKG did not reveal ST elevations, did have inferior Q waves and right bundle branch block     Left heart cath 3/2 revealed 100% proximal RCA lesion, 100% LAD lesion and 90% ostial left circumflex lesion.      S/p high risk PCI to ostial LCx with Impella support on 3/8.  Cath findings also showed 60% ostial LAD lesion and 40% left main lesion.     Continue aspirin and Brilinta  Continue Atorvastatin  Continue management of heart failure and shock as above.   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)  Long-standing diagnosis   No acute concerns     Plan:   Continue home steroid regimen:   Prednisone 10mg qAM  Prednisone 5mg qHS  Outpatient follow up   Acute kidney injury (HCC)  Multifactorial from cardiogenic shock, contrast exposures, possible underlying renal disease given history of Wegener's granulomatosis   Baseline creatinine: Unknown  Initial creatinine: 1.65 (3/2/25)   Last creatinine: 1.87 (3/8/25)      Plan:   Strict q4h I/O monitoring  Monitor UOP without valencia catheter   Continue to follow renal function tests  Severe mitral regurgitation  Likely functional in the setting of dilated cardiomyopathy     Moderate aortic insufficiency  3/2/25 TTE: The aortic valve is trileaflet. The leaflets are severely thickened. The leaflets are severely calcified. There is severely reduced mobility. There is moderate regurgitation. The aortic valve has no significant stenosis      Plan:   Volume removal and medical management   Palliative care encounter  Ongoing consultation and discussion of  goals      Gout  History of gout previously on colchicine and allopurinol and has been on indomethacin.  He is not currently on medications. Does note an acute flare of gout on his right thumb with multiple tophaceous lesions throughout his body.     - Will start colchicine and allopurinol    24 Hour Events : no acute events overnight     Subjective : Patient feels well at this time.  Was able to walk in the hallway with PT and OT.  Does note some gout flare in his thumb and has multiple tophaceous gout lesions in his joints.  Does state that he was previously on colchicine a long time ago.    Objective :  Temp:  [97.7 °F (36.5 °C)-98 °F (36.7 °C)] 98 °F (36.7 °C)  HR:  [100-117] 100  BP: ()/(63-82) 100/63  Resp:  [18-35] 18  SpO2:  [94 %-100 %] 97 %  O2 Device: None (Room air)  Nasal Cannula O2 Flow Rate (L/min):  [2 L/min] 2 L/min    Physical Exam  Vitals and nursing note reviewed.   Constitutional:       General: He is not in acute distress.     Appearance: He is well-developed.   HENT:      Head: Normocephalic and atraumatic.   Eyes:      Conjunctiva/sclera: Conjunctivae normal.   Cardiovascular:      Rate and Rhythm: Normal rate and regular rhythm.      Heart sounds: No murmur heard.  Pulmonary:      Effort: Pulmonary effort is normal. No respiratory distress.      Breath sounds: Normal breath sounds.   Abdominal:      Palpations: Abdomen is soft.      Tenderness: There is no abdominal tenderness.   Musculoskeletal:         General: No swelling.      Cervical back: Neck supple.   Skin:     General: Skin is warm and dry.      Capillary Refill: Capillary refill takes less than 2 seconds.   Neurological:      Mental Status: He is alert.   Psychiatric:         Mood and Affect: Mood normal.         Lab Results: I have reviewed the following results:          VTE Pharmacologic Prophylaxis: VTE covered by:  heparin (porcine), Subcutaneous, 5,000 Units at 03/10/25 0506      VTE Mechanical Prophylaxis: sequential  compression device

## 2025-03-10 NOTE — PROGRESS NOTES
Sent Epic Chat to Dr. Moyer to coordinate letter of medical necessity to attach to [place] application.   Dr. Moyer entered progress note today containing the letter.    9:45am Called AMY. No answer.  10:09am Called AMY and left detailed voice messg requesting that this letter of medical necessity be attached to pt's [place] application.     11:00am Met with patient in-person. He was sitting up in recliner chair and alert. Provided pt with copy of Letter of Medical Necessity so his spouse can forward to their .   Pt said that his spouse checked into private insurance options however it is questionable if they cover pre-existing conditions. Pt said his spouse will be here soon. LCSW will re-visit.    Re-visited patient's room and spoke with spouse/pt.  Spouse reported that she spoke with pt's  who stated there is nothing that can be done to speed up approval of Green Card. Pt applied 5 years ago.     Spouse stated that she spoke with  named Ganga Johnson [phone] because he assists ppl under age 65 with insurance plans. Spouse said that they are looking at a plan from [employer] and pt requested if this LCSW could speak with Mr. Johnson.    Called Mr. Johnson who stated that [employer] could offer an individual plan with approx $500 monthly premium that could cover doctor visits, labs, home health care and IV medications at home. However there is an elimination/waiting period of 12 months for pre-existing conditions so this plan most likely would not cover critical incidents and surgery within the next 12 months. Mr. Johnson stated that he's calling pt/spouse today to further explain.   Mr. Johnson said ideal situation is for pt to get Full [place] coverage and then cancel the [employer] Plan.    LCSW asked Mr. Johnson if he could make sure that [facility] is in-network and he will find out.    LCSW inquired if pt could apply for Marketplace Plan and Mr. Johnson stated that open  enrollment is not until November 2025 and then plan would not begin until 1/1/26. Mr. Johnson said that non-[place] citizens can apply for Marketplace Insurance, but they would not get subsidized rates. Mr. Johnson also said that pre-existing condition would be covered during open enrollment period or during Special Enrollment Period such as loss of insurance due to job termination or moving.    Called spouse to inform her of conversation with Mr. Johnson.   She believes it is best to purchase the [employer] plan so that pt will have coverage for outpatient services moving forward.   [facility] also reminded her of [email or web address] and phone # [phone] in case she wants to compare plans with [employer] prior to making final decision.     Updated Care Plan.   [facility] will continue to be available for support.

## 2025-03-10 NOTE — QUICK NOTE
To whom it may concern,       Mr. Rodney Pizarro is currently hospitalized at [facility] starting 3/2/25 due to Acute on Chronic HFrEF, Stage C/D, NYHA IV and Left Ventricle Ejection Fraction of 15-20%. He requires life sustaining surgical intervention and on-going outpatient services, medications as well as medical equipment for maintenance of IV Home Infusion and possible LVAD Implant. He underwent impella assisted intervention to his LCx. So far he is unable to get off IV milrinone and likely will need output infusion of IV milrinone to support his blood pressure and cardiac function.    Mr. Rodney Pizarro needs full coverage approved for his [place] Medical [place] Application as all other resources for on-going outpatient medical care have been exhausted.  Mr. Pizarro reported that he entered the [place] from [place] and was sponsored by his daughter. He has resided in the [place] since 1998, pays taxes and his Green Card Application is being reviewed. Please consider approval of full coverage [place] for Mr. Rodney Pizarro.      REG VIGIL D.O.  DIRECTOR OF HEART FAILURE/ PULMONARY HYPERTENSION  MEDICAL DIRECTOR OF LVAD PROGRAM  [facility]

## 2025-03-10 NOTE — PLAN OF CARE
Problem: OCCUPATIONAL THERAPY ADULT  Goal: Performs self-care activities at highest level of function for planned discharge setting.  See evaluation for individualized goals.  Description: Treatment Interventions: ADL retraining, Functional transfer training, Endurance training, Continued evaluation, Energy conservation          See flowsheet documentation for full assessment, interventions and recommendations.   Outcome: Progressing  Note: Limitation: Decreased ADL status, Decreased Safe judgement during ADL, Decreased endurance, Decreased self-care trans, Decreased high-level ADLs  Prognosis: Good  Assessment: Pt seen for OT treatment session on this date focused on ADL retraining, functional transfers and mobility, energy conservation, functional endurance, recall of safety precautions, and patient education. Pt was greeted in chair and was cooperative throughout session. Pt performs community distances w/ supervision and no AD this date. Pt declines grooming and UB dressing as he reports completing all tasks this morning. Simulated home tasks by opening cabinets and visually scanning, reaching, and grabbing for pants. Pt able to don undergarments and pants while seated in bedside chair. Following session, pt was left in chair with chair alarm on and all needs within reach. Pt demonstrates improvements in functional mobility and increased activity tolerance and endurance this date.The patient's raw score on the -PAC Daily Activity Inpatient Short Form is 19. A raw score of greater than or equal to 19 suggests the patient may benefit from discharge to home. Please refer to the recommendation of the Occupational Therapist for safe discharge planning. Pt would benefit from continued acute OT intervention with plan to continue OT treatment sessions 2-3x per week. Recommend d/c home.     Rehab Resource Intensity Level, OT: No post-acute rehabilitation needs

## 2025-03-10 NOTE — OCCUPATIONAL THERAPY NOTE
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   Reciprocal Relationships supportive family   Service to Others retired   Intrinsic Gratification enjoys time with his grand children   ADL   Where Assessed Chair   Grooming Comments Pt declines grooming, reporting he completed this AM   LB Dressing Assistance 6  Modified independent   LB Dressing Deficit Thread RLE into pants;Thread LLE into pants;Thread RLE into underwear;Thread LLE into underwear;Pull up over hips   LB Dressing Comments Pt dons undergarments and pants seated in chair.   Bed Mobility   Additional Comments Pt greeted OOB in chair, left w/ chair alarm on and all needs within reach.   Transfers   Sit to Stand 5  Supervision   Additional items Verbal cues  (Reminders for safety)   Stand to Sit 6  Modified independent   Functional Mobility   Functional Mobility 6  Modified independent   Additional Comments Pt performs community distance w/ no AD. Pt simulated home tasks by opening cabinets, visually scanning and reaching for pants.   Subjective   Subjective \"Don't worry I " "won't race\"   Cognition   Overall Cognitive Status WFL   Arousal/Participation Alert;Responsive;Cooperative   Attention Within functional limits   Orientation Level Oriented X4   Memory Within functional limits   Following Commands Follows one step commands without difficulty   Comments Pt pleasant and cooperative this date, eager to walk and return home.   Activity Tolerance   Activity Tolerance Patient tolerated treatment well   Medical Staff Made Aware Rn made aware   Assessment   Assessment Pt seen for OT treatment session on this date focused on ADL retraining, functional transfers and mobility, energy conservation, functional endurance, recall of safety precautions, and patient education. Pt was greeted in chair and was cooperative throughout session. Pt performs community distances w/ supervision and no AD this date. Pt declines grooming and UB dressing as he reports completing all tasks this morning. Simulated home tasks by opening cabinets and visually scanning, reaching, and grabbing for pants. Pt able to don undergarments and pants while seated in bedside chair. Following session, pt was left in chair with chair alarm on and all needs within reach. Pt demonstrates improvements in functional mobility and increased activity tolerance and endurance this date.The patient's raw score on the AM-PAC Daily Activity Inpatient Short Form is 23. A raw score of greater than or equal to 19 suggests the patient may benefit from discharge to home. Please refer to the recommendation of the Occupational Therapist for safe discharge planning. Pt likely close to baseline indicating no further acute OT needs at this time, d/c acute OT. Recommend d/c home with supportive spouse and no further OT needs.   Plan   Treatment Interventions ADL retraining;Endurance training;Functional transfer training;Patient/family training;Continued evaluation;Energy conservation;Activityengagement   Goal Expiration Date 03/19/25   OT Treatment " Day 1   OT Frequency Other (comment)  (D/C acute OT)   Discharge Recommendation   Rehab Resource Intensity Level, OT No post-acute rehabilitation needs   AM-PAC Daily Activity Inpatient   Lower Body Dressing 4   Bathing 3   Toileting 4   Upper Body Dressing 4   Grooming 4   Eating 4   Daily Activity Raw Score 23   Daily Activity Standardized Score (Calc for Raw Score >=11) 51.12   AM-PAC Applied Cognition Inpatient   Following a Speech/Presentation 4   Understanding Ordinary Conversation 4   Taking Medications 4   Remembering Where Things Are Placed or Put Away 4   Remembering List of 4-5 Errands 4   Taking Care of Complicated Tasks 3   Applied Cognition Raw Score 23   Applied Cognition Standardized Score 53.08   End of Consult   Education Provided Yes   Patient Position at End of Consult Bedside chair;Bed/Chair alarm activated;All needs within reach   Nurse Communication Nurse aware of consult   End of Consult Comments Pt seated in bedside chair with chair alarm on and all needs within reach.       Donna Baum, SHAHIDA, OTR/L

## 2025-03-10 NOTE — ASSESSMENT & PLAN NOTE
History of gout previously on colchicine and allopurinol and has been on indomethacin.  He is not currently on medications. Does note an acute flare of gout on his right thumb with multiple tophaceous lesions throughout his body.     - Will start colchicine and allopurinol

## 2025-03-10 NOTE — PLAN OF CARE
Problem: OCCUPATIONAL THERAPY ADULT  Goal: Performs self-care activities at highest level of function for planned discharge setting.  See evaluation for individualized goals.  Description: Treatment Interventions: ADL retraining, Functional transfer training, Endurance training, Continued evaluation, Energy conservation          See flowsheet documentation for full assessment, interventions and recommendations.   3/10/2025 1230 by Donna Baum OT  Outcome: Completed  Note: Limitation: Decreased ADL status, Decreased Safe judgement during ADL, Decreased endurance, Decreased self-care trans, Decreased high-level ADLs  Prognosis: Good  Assessment: Pt seen for OT treatment session on this date focused on ADL retraining, functional transfers and mobility, energy conservation, functional endurance, recall of safety precautions, and patient education. Pt was greeted in chair and was cooperative throughout session. Pt performs community distances w/ supervision and no AD this date. Pt declines grooming and UB dressing as he reports completing all tasks this morning. Simulated home tasks by opening cabinets and visually scanning, reaching, and grabbing for pants. Pt able to don undergarments and pants while seated in bedside chair. Following session, pt was left in chair with chair alarm on and all needs within reach. Pt demonstrates improvements in functional mobility and increased activity tolerance and endurance this date.The patient's raw score on the AM-PAC Daily Activity Inpatient Short Form is 23. A raw score of greater than or equal to 19 suggests the patient may benefit from discharge to home. Please refer to the recommendation of the Occupational Therapist for safe discharge planning. Pt likely close to baseline indicating no further acute OT needs at this time, d/c acute OT. Recommend d/c home with supportive spouse and no further OT needs.     Rehab Resource Intensity Level, OT: No post-acute rehabilitation  needs       3/10/2025 1156 by Donna Baum OT  Outcome: Progressing  Note: Limitation: Decreased ADL status, Decreased Safe judgement during ADL, Decreased endurance, Decreased self-care trans, Decreased high-level ADLs  Prognosis: Good  Assessment: Pt seen for OT treatment session on this date focused on ADL retraining, functional transfers and mobility, energy conservation, functional endurance, recall of safety precautions, and patient education. Pt was greeted in chair and was cooperative throughout session. Pt performs community distances w/ supervision and no AD this date. Pt declines grooming and UB dressing as he reports completing all tasks this morning. Simulated home tasks by opening cabinets and visually scanning, reaching, and grabbing for pants. Pt able to don undergarments and pants while seated in bedside chair. Following session, pt was left in chair with chair alarm on and all needs within reach. Pt demonstrates improvements in functional mobility and increased activity tolerance and endurance this date.The patient's raw score on the -PAC Daily Activity Inpatient Short Form is 19. A raw score of greater than or equal to 19 suggests the patient may benefit from discharge to home. Please refer to the recommendation of the Occupational Therapist for safe discharge planning. Pt would benefit from continued acute OT intervention with plan to continue OT treatment sessions 2-3x per week. Recommend d/c home.     Rehab Resource Intensity Level, OT: No post-acute rehabilitation needs

## 2025-03-10 NOTE — ASSESSMENT & PLAN NOTE
In the setting of ischemic cardiomyopathy and decompensated heart failure  SCAI stage A/B  Ischemic cardiomyopathy EF 20%.  Dilated LV.  Was on milrinone which now has been weaned off.

## 2025-03-11 ENCOUNTER — PATIENT OUTREACH (OUTPATIENT)
Dept: CARDIOLOGY CLINIC | Facility: CLINIC | Age: 60
End: 2025-03-11

## 2025-03-11 VITALS
RESPIRATION RATE: 18 BRPM | HEIGHT: 67 IN | HEART RATE: 102 BPM | BODY MASS INDEX: 25.57 KG/M2 | DIASTOLIC BLOOD PRESSURE: 73 MMHG | OXYGEN SATURATION: 98 % | WEIGHT: 162.92 LBS | SYSTOLIC BLOOD PRESSURE: 105 MMHG | TEMPERATURE: 97.9 F

## 2025-03-11 LAB
ANION GAP SERPL CALCULATED.3IONS-SCNC: 10 MMOL/L (ref 4–13)
BASOPHILS # BLD AUTO: 0.04 THOUSANDS/ÂΜL (ref 0–0.1)
BASOPHILS NFR BLD AUTO: 0 % (ref 0–1)
BUN SERPL-MCNC: 50 MG/DL (ref 5–25)
CALCIUM SERPL-MCNC: 8.1 MG/DL (ref 8.4–10.2)
CHLORIDE SERPL-SCNC: 91 MMOL/L (ref 96–108)
CO2 SERPL-SCNC: 30 MMOL/L (ref 21–32)
CREAT SERPL-MCNC: 1.98 MG/DL (ref 0.6–1.3)
EOSINOPHIL # BLD AUTO: 0.28 THOUSAND/ÂΜL (ref 0–0.61)
EOSINOPHIL NFR BLD AUTO: 3 % (ref 0–6)
ERYTHROCYTE [DISTWIDTH] IN BLOOD BY AUTOMATED COUNT: 13.2 % (ref 11.6–15.1)
GFR SERPL CREATININE-BSD FRML MDRD: 35 ML/MIN/1.73SQ M
GLUCOSE SERPL-MCNC: 122 MG/DL (ref 65–140)
HCT VFR BLD AUTO: 40.5 % (ref 36.5–49.3)
HGB BLD-MCNC: 13.7 G/DL (ref 12–17)
IMM GRANULOCYTES # BLD AUTO: 0.1 THOUSAND/UL (ref 0–0.2)
IMM GRANULOCYTES NFR BLD AUTO: 1 % (ref 0–2)
LYMPHOCYTES # BLD AUTO: 1.37 THOUSANDS/ÂΜL (ref 0.6–4.47)
LYMPHOCYTES NFR BLD AUTO: 14 % (ref 14–44)
MAGNESIUM SERPL-MCNC: 2.2 MG/DL (ref 1.9–2.7)
MCH RBC QN AUTO: 30.1 PG (ref 26.8–34.3)
MCHC RBC AUTO-ENTMCNC: 33.8 G/DL (ref 31.4–37.4)
MCV RBC AUTO: 89 FL (ref 82–98)
MONOCYTES # BLD AUTO: 0.88 THOUSAND/ÂΜL (ref 0.17–1.22)
MONOCYTES NFR BLD AUTO: 9 % (ref 4–12)
NEUTROPHILS # BLD AUTO: 6.85 THOUSANDS/ÂΜL (ref 1.85–7.62)
NEUTS SEG NFR BLD AUTO: 73 % (ref 43–75)
NRBC BLD AUTO-RTO: 0 /100 WBCS
PLATELET # BLD AUTO: 335 THOUSANDS/UL (ref 149–390)
PMV BLD AUTO: 10.8 FL (ref 8.9–12.7)
POTASSIUM SERPL-SCNC: 3.7 MMOL/L (ref 3.5–5.3)
RBC # BLD AUTO: 4.55 MILLION/UL (ref 3.88–5.62)
SODIUM SERPL-SCNC: 131 MMOL/L (ref 135–147)
WBC # BLD AUTO: 9.52 THOUSAND/UL (ref 4.31–10.16)

## 2025-03-11 PROCEDURE — 85025 COMPLETE CBC W/AUTO DIFF WBC: CPT | Performed by: STUDENT IN AN ORGANIZED HEALTH CARE EDUCATION/TRAINING PROGRAM

## 2025-03-11 PROCEDURE — 99238 HOSP IP/OBS DSCHRG MGMT 30/<: CPT | Performed by: INTERNAL MEDICINE

## 2025-03-11 PROCEDURE — 83735 ASSAY OF MAGNESIUM: CPT | Performed by: STUDENT IN AN ORGANIZED HEALTH CARE EDUCATION/TRAINING PROGRAM

## 2025-03-11 PROCEDURE — NC001 PR NO CHARGE: Performed by: INTERNAL MEDICINE

## 2025-03-11 PROCEDURE — 99232 SBSQ HOSP IP/OBS MODERATE 35: CPT | Performed by: STUDENT IN AN ORGANIZED HEALTH CARE EDUCATION/TRAINING PROGRAM

## 2025-03-11 PROCEDURE — 80048 BASIC METABOLIC PNL TOTAL CA: CPT | Performed by: STUDENT IN AN ORGANIZED HEALTH CARE EDUCATION/TRAINING PROGRAM

## 2025-03-11 RX ORDER — CLOPIDOGREL BISULFATE 75 MG/1
75 TABLET ORAL DAILY
Status: DISCONTINUED | OUTPATIENT
Start: 2025-03-12 | End: 2025-03-11 | Stop reason: HOSPADM

## 2025-03-11 RX ORDER — ATORVASTATIN CALCIUM 80 MG/1
80 TABLET, FILM COATED ORAL
Qty: 30 TABLET | Refills: 0 | Status: SHIPPED | OUTPATIENT
Start: 2025-03-11 | End: 2025-03-19

## 2025-03-11 RX ORDER — ISOSORBIDE DINITRATE 10 MG/1
10 TABLET ORAL
Qty: 90 TABLET | Refills: 0 | Status: SHIPPED | OUTPATIENT
Start: 2025-03-11 | End: 2025-03-19

## 2025-03-11 RX ORDER — HYDRALAZINE HYDROCHLORIDE 10 MG/1
20 TABLET, FILM COATED ORAL EVERY 8 HOURS SCHEDULED
Qty: 180 TABLET | Refills: 0 | Status: SHIPPED | OUTPATIENT
Start: 2025-03-11 | End: 2025-03-19

## 2025-03-11 RX ORDER — ASPIRIN 81 MG/1
81 TABLET, CHEWABLE ORAL DAILY
Qty: 30 TABLET | Refills: 0 | Status: SHIPPED | OUTPATIENT
Start: 2025-03-12 | End: 2025-03-19

## 2025-03-11 RX ORDER — BUMETANIDE 1 MG/1
3 TABLET ORAL 2 TIMES DAILY
Qty: 180 TABLET | Refills: 0 | Status: SHIPPED | OUTPATIENT
Start: 2025-03-11 | End: 2025-03-19

## 2025-03-11 RX ORDER — CLOPIDOGREL BISULFATE 75 MG/1
75 TABLET ORAL DAILY
Status: DISCONTINUED | OUTPATIENT
Start: 2025-03-12 | End: 2025-03-11

## 2025-03-11 RX ORDER — CLOPIDOGREL 300 MG/1
300 TABLET, FILM COATED ORAL ONCE
Status: COMPLETED | OUTPATIENT
Start: 2025-03-11 | End: 2025-03-11

## 2025-03-11 RX ORDER — CLOPIDOGREL BISULFATE 75 MG/1
75 TABLET ORAL DAILY
Qty: 30 TABLET | Refills: 0 | Status: SHIPPED | OUTPATIENT
Start: 2025-03-11 | End: 2025-03-19

## 2025-03-11 RX ORDER — CLOPIDOGREL 300 MG/1
300 TABLET, FILM COATED ORAL ONCE
Status: DISCONTINUED | OUTPATIENT
Start: 2025-03-11 | End: 2025-03-11

## 2025-03-11 RX ORDER — CLOPIDOGREL BISULFATE 75 MG/1
75 TABLET ORAL DAILY
Qty: 30 TABLET | Refills: 0 | Status: SHIPPED | OUTPATIENT
Start: 2025-03-12 | End: 2025-03-13

## 2025-03-11 RX ORDER — ALLOPURINOL 100 MG/1
50 TABLET ORAL DAILY
Qty: 15 TABLET | Refills: 0 | Status: SHIPPED | OUTPATIENT
Start: 2025-03-12 | End: 2025-03-19

## 2025-03-11 RX ADMIN — HYDRALAZINE HYDROCHLORIDE 20 MG: 10 TABLET ORAL at 05:52

## 2025-03-11 RX ADMIN — ASPIRIN 81 MG: 81 TABLET, CHEWABLE ORAL at 09:02

## 2025-03-11 RX ADMIN — ALLOPURINOL 50 MG: 100 TABLET ORAL at 09:02

## 2025-03-11 RX ADMIN — TICAGRELOR 90 MG: 90 TABLET ORAL at 09:02

## 2025-03-11 RX ADMIN — COLCHICINE 0.6 MG: 0.6 TABLET ORAL at 09:02

## 2025-03-11 RX ADMIN — BUMETANIDE 3 MG: 1 TABLET ORAL at 09:02

## 2025-03-11 RX ADMIN — HEPARIN SODIUM 5000 UNITS: 5000 INJECTION INTRAVENOUS; SUBCUTANEOUS at 13:17

## 2025-03-11 RX ADMIN — ISOSORBIDE DINITRATE 10 MG: 10 TABLET ORAL at 13:16

## 2025-03-11 RX ADMIN — ISOSORBIDE DINITRATE 10 MG: 10 TABLET ORAL at 09:03

## 2025-03-11 RX ADMIN — HYDRALAZINE HYDROCHLORIDE 20 MG: 10 TABLET ORAL at 13:16

## 2025-03-11 RX ADMIN — HEPARIN SODIUM 5000 UNITS: 5000 INJECTION INTRAVENOUS; SUBCUTANEOUS at 05:49

## 2025-03-11 RX ADMIN — CLOPIDOGREL BISULFATE 300 MG: 300 TABLET, FILM COATED ORAL at 14:07

## 2025-03-11 RX ADMIN — PREDNISONE 10 MG: 10 TABLET ORAL at 05:50

## 2025-03-11 NOTE — ASSESSMENT & PLAN NOTE
Long-standing diagnosis   No acute concerns  Will set up with nephrology outpatient      Plan:   Continue home steroid regimen:   Prednisone 10mg qAM  Prednisone 5mg qHS  Outpatient follow up

## 2025-03-11 NOTE — ASSESSMENT & PLAN NOTE
In the setting of ischemic cardiomyopathy and decompensated heart failure  SCAI stage A/B  Currently on milrinone 0.25, SVO 2 of 52.3, hemoglobin of 14  Stefani: CO: 3.3, Cardiac Index: 1.8  Warm and euvolemic  MAP of 82  LFTs improved, kidney function stable  I/O last 24 hours: I 1.1L, O -2.7L, I/O -1.6, Net -10.1L  No significant events noted on telemetry  No further fevers     Plan:  S/p high risk PCI to ostial LCx with Impella support on 3/7.  Awaiting hopeful recovery following revascularization.  Overall volume status is improving, and doing well on 0.25 of milrinone, continue.    Continue Bumex 3 mg 2 times daily.   HF follow up outpatient   Obtain repeat echo next week to evaluate for improvement.  Not currently a candidate for OHT/LVAD.  Not a surgical candidate.

## 2025-03-11 NOTE — ASSESSMENT & PLAN NOTE
Has prior history of MI in 2011 and 2014 s/p PCI to LAD and RCA  Presented with chest pain diaphoresis and elevated troponin  EKG did not reveal ST elevations, did have inferior Q waves and right bundle branch block     Left heart cath 3/2 revealed 100% proximal RCA lesion, 100% LAD lesion and 90% ostial left circumflex lesion.      S/p high risk PCI to ostial LCx with Impella support on 3/8.  Cath findings also showed 60% ostial LAD lesion and 40% left main lesion.     Continue aspirin and plavix  Continue Atorvastatin  Continue management of heart failure and shock as above.

## 2025-03-11 NOTE — PROGRESS NOTES
Progress Note - Heart Failure   Name: Rodney Pizarro 60 y.o. male I MRN: [id number]  Unit/Bed#: PPHP-316-01 I Date of Admission: 3/2/2025   Date of Service: 3/11/2025 I Hospital Day: 9         60 year old male with ischemic cardiomyopathy with stage D HF EF 15-20% not candidate for LVAD/OHT, MI in 2011 and 2014 s/p PCI, history of granulomatosis with polyangiitis with renal involvement, severe MR, moderate AI. Presented as unstable angina with initial cath showing proximal LAD occluded at site of prior stent, considered high risk for PCI. Underwent repeat cath with previously occluded pLAD now open (likely thrombus) and s/p PCI to os Lcx (impella assist) with elevated LVEDP. Was on Milrinone. Has no insurance. Not candidate for transplant or LVAD at this time. Was weaned off Milrinone and transfer out of CCU. HF is following him for advance ischemic cardiomyopathy.    Assessment & Plan  Cardiogenic shock (HCC)  In the setting of ischemic cardiomyopathy and decompensated heart failure  SCAI stage A/B  Ischemic cardiomyopathy EF 20%.  Dilated LV.  Was on milrinone which now has been weaned off.  Coronary artery disease involving native coronary artery of native heart with unstable angina pectoris (HCC)  Has prior history of MI in 2011 and 2014 s/p PCI to LAD and RCA  Presented with chest pain diaphoresis and elevated troponin  EKG did not reveal ST elevations, did have inferior Q waves and right bundle branch block    Left heart cath 3/2 revealed 100% proximal RCA lesion, 100% LAD lesion and 90% ostial left circumflex lesion.     S/p high risk PCI to ostial LCx with Impella support on 3/8.  Cath findings also showed 60% ostial LAD lesion and 40% left main lesion.    Ischemic dilated cardiomyopathy (HFrEF 20%) due to coronary artery disease  See plan above.   Severe mitral regurgitation  Likely functional in the setting of dilated cardiomyopathy  Moderate aortic insufficiency  Also noted to have moderate AI and  low-flow low gradient AS  Acute kidney injury (HCC)  Monitor BMP.  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)  On prednisone for this  Palliative care encounter    Gout  Noted in his Thumb.   Management per primary team.      Plan:    Milrinone weaned off 3/9 morning.   Started on Isordil hydralazine for SVR reduction. Doing well. Tolerating.   Sinus tach on telemetry although holding onto starting beta-blocker for now.   Euvolemic. C/w PO Bumex 3 mg BID.   On steroids for his vasculitis.  On DAPT with aspirin and Brilinta for recent PCI.  Will discuss of switching to Plavix given no insurance.   Patient does not have insurance.  Not a candidate for transplant or LVAD at this time.  Started on Allopurinol + Colchicine by Primary for suspected gout in R thumb.   Will get him 3-5 day HF follow up.   Rest of the care per primary team.        Subjective     Patient was seen and examined today.  Patient admits to walking in the room and outside without any difficulties today.  Clinically he looks good.    Objective     Vitals: Temp (24hrs), Av.9 °F (36.6 °C), Min:97.6 °F (36.4 °C), Max:98.3 °F (36.8 °C)  Current: Temperature: 98.3 °F (36.8 °C)  Patient Vitals for the past 24 hrs:   BP Temp Temp src Pulse Resp SpO2 Weight   25 0704 98/69 98.3 °F (36.8 °C) Oral (!) 106 18 99 % --   25 0551 118/81 -- -- (!) 106 -- 100 % --   25 0535 -- -- -- -- -- -- 73.9 kg (162 lb 14.7 oz)   25 0219 (!) 89/62 97.7 °F (36.5 °C) -- 99 -- 99 % --   03/10/25 2229 97/67 98.1 °F (36.7 °C) -- 104 13 97 % --   03/10/25 2130 96/72 -- -- (!) 113 -- 98 % --   03/10/25 2124 96/72 -- -- -- -- -- --   03/10/25 2100 -- -- -- -- -- 99 % --   03/10/25 1909 94/68 97.9 °F (36.6 °C) -- (!) 108 18 98 % --   03/10/25 1500 -- -- Oral -- -- -- --   03/10/25 1457 103/72 97.6 °F (36.4 °C) -- (!) 116 18 98 % --   03/10/25 1412 104/75 -- -- (!) 110 -- 98 % --   03/10/25 1114 100/63 98 °F (36.7 °C) -- 100 18 97 % --    03/10/25 0848 96/66 -- -- (!) 114 -- 99 % --    Body mass index is 25.52 kg/m².  Physical Exam:  Physical Exam  Vitals reviewed.   Constitutional:       General: He is not in acute distress.     Appearance: He is well-developed. He is not diaphoretic.   Cardiovascular:      Rate and Rhythm: Normal rate and regular rhythm.      Heart sounds: Normal heart sounds. No murmur heard.     No friction rub.   Pulmonary:      Effort: Pulmonary effort is normal. No respiratory distress.      Breath sounds: Normal breath sounds. No stridor. No wheezing.   Psychiatric:         Mood and Affect: Mood normal.         Behavior: Behavior normal.         Thought Content: Thought content normal.         Judgment: Judgment normal.         Invasive Devices       Central Venous Catheter Line  Duration             CVC Central Lines 03/02/25 Triple Right Internal jugular 8 days                        Labs:   Results from last 7 days   Lab Units 03/11/25  0431 03/10/25  0440 03/09/25  0407 03/08/25  0346 03/07/25  0443 03/06/25  0432 03/05/25  0404   WBC Thousand/uL 9.52 12.25* 10.44* 8.59 8.72 10.19* 11.68*   HEMOGLOBIN g/dL 13.7 14.8 14.4 14.0 14.1 14.2 14.7   HEMATOCRIT % 40.5 45.8 43.6 42.7 43.5 43.7 45.0   PLATELETS Thousands/uL 335 333 293 270 275 249 239   SEGS PCT % 73  --   --   --   --   --   --    MONO PCT % 9  --   --   --   --   --   --    EOS PCT % 3  --   --   --   --   --   --       Results from last 7 days   Lab Units 03/11/25  0431 03/10/25  1420 03/10/25  0440 03/09/25  0407 03/08/25  0346 03/07/25  1541 03/07/25  0443 03/06/25  1604 03/06/25  0432 03/05/25  1611 03/05/25  0403 03/05/25  0359 03/04/25  2349 03/04/25  1757 03/04/25  1135   SODIUM mmol/L 131* 130* 134* 134* 131* 129* 131* 132* 133* 134* 136  --  135 135 137   POTASSIUM mmol/L 3.7 4.2 4.7 3.7 3.6 4.5 3.7 4.0 3.6 4.0 3.9  --  3.9 3.8 4.0   CHLORIDE mmol/L 91* 89* 90* 91* 89* 90* 89* 91* 91* 91* 93*  --  92* 93* 94*   CO2 mmol/L 30 31 32 32 33* 30 34* 31 33*  "36* 33*  --  33* 35* 34*   BUN mg/dL 50* 50* 44* 38* 41* 40* 42* 45* 38* 40* 37*  --  37* 35* 31*   CREATININE mg/dL 1.98* 1.99* 2.04* 1.62* 1.87* 2.03* 1.98* 1.95* 1.89* 2.11* 1.91*  --  1.92* 2.08* 2.02*   CALCIUM mg/dL 8.1* 8.8 9.1 8.5 8.6 8.9 8.7 9.0 8.3* 8.5 8.6  --  8.6 8.7 9.2   ALK PHOS U/L  --   --   --   --   --   --  49  --  46  --  47  --   --   --   --    ALT U/L  --   --   --   --   --   --  56*  --  72*  --  100*  --   --   --   --    AST U/L  --   --   --   --   --   --  96*  --  169*  --  372*  --   --   --   --    MAGNESIUM mg/dL 2.2  --  2.3 2.0 2.0  --  1.8*  --  2.0  --  2.2  --  2.1 2.2 2.3   PHOSPHORUS mg/dL  --   --   --  3.5  --   --  4.4  --  4.3  --   --   --   --   --   --    PTT seconds  --   --   --   --   --   --  85*  --  83*  --   --  65*  --   --   --    EGFR ml/min/1.73sq m 35 35 34 45 38 34 35 36 37 33 37  --  37 33 35     Results from last 7 days   Lab Units 03/07/25  0443 03/06/25  0432 03/05/25  0359   PTT seconds 85* 83* 65*     Results from last 7 days   Lab Units 03/04/25  1047   LACTIC ACID mmol/L 1.3         No results found for: \"PHART\", \"MDA5TMB\", \"PO2ART\", \"AQN9PBW\", \"U4CGERGR\", \"BEART\", \"SOURCE\"  No components found for: \"HIV1X2\"  No results found for: \"HAV\", \"HEPAIGM\", \"HEPBIGM\", \"HEPBCAB\", \"HBEAG\", \"HEPCAB\"  No results found for: \"SPEP\", \"UPEP\"   Lab Results   Component Value Date    HGBA1C 5.7 (H) 03/03/2025     No results found for: \"CHOL\"   Lab Results   Component Value Date    HDL 55 03/03/2025      Lab Results   Component Value Date    LDLCALC 141 (H) 03/03/2025      Lab Results   Component Value Date    TRIG 100 03/03/2025     No components found for: \"PROCAL\"      Micro:  Results from last 7 days   Lab Units 03/04/25  1047   BLOOD CULTURE  No Growth After 5 Days.  No Growth After 5 Days.     Urinalysis:  No results found for: \"AMPHETUR\", \"BDZUR\", \"COCAINEUR\", \"OPIATEUR\", \"PCPUR\", \"THCUR\", \"ETOH\", \"ACTMNPHEN\", \"SALICYLATE\"       Invalid input(s): " "\"URIBILINOGEN\"        Intake and Outputs:  I/O         03/08 0701 03/09 0700 03/09 0701  03/10 0700 03/10 0701 03/11 0700    P.O. 940 118 500    I.V. (mL/kg) 80.7 (1.1) 9 (0.1)     IV Piggyback       Total Intake(mL/kg) 1020.7 (13.9) 127 (1.7) 500 (6.8)    Urine (mL/kg/hr) 3025 (1.7) 950 (0.5)     Total Output 3025 950     Net -2004.3 -823 +500                 Nutrition:  Diet Cardiovascular; Cardiac; Fluid Restriction 2000 ML  Radiology Results:   VAS limited iliac-femoral evaluation for Impella Device   Final Result by Ganga Porter MD (03/04 0931)      XR chest portable ICU   Final Result by Comfort Ledesma MD (03/03 0908)      No acute cardiopulmonary disease.            Workstation performed: [id number]           Scheduled Medications:  allopurinol, 50 mg, Daily  aspirin, 81 mg, Daily  atorvastatin, 80 mg, Daily With Dinner  bumetanide, 3 mg, BID  colchicine, 0.6 mg, Daily  heparin (porcine), 5,000 Units, Q8H ANDRESSA  hydrALAZINE, 20 mg, Q8H ANDRESSA  isosorbide dinitrate, 10 mg, TID after meals  melatonin, 3 mg, HS  polyethylene glycol, 17 g, Daily  predniSONE, 10 mg, Early Morning  predniSONE, 5 mg, HS  senna-docusate sodium, 1 tablet, BID  ticagrelor, 90 mg, Q12H ANDRESSA      PRN MEDS:  acetaminophen, 650 mg, Q6H PRN  bisacodyl, 10 mg, Daily PRN  ondansetron, 4 mg, Q6H PRN  oxyCODONE, 2.5 mg, Q4H PRN   Or  oxyCODONE, 5 mg, Q4H PRN  sodium chloride, 1 spray, Q1H PRN  trimethobenzamide, 200 mg, Q6H PRN      Last 24 Hour Meds: :   Medication Administration - last 24 hours from 03/10/2025 0846 to 03/11/2025 0846         Date/Time Order Dose Route Action Action by     03/10/2025 0848 EDT aspirin chewable tablet 81 mg 81 mg Oral Given Mayra Kirkland RN     03/10/2025 2125 EDT predniSONE tablet 5 mg 5 mg Oral Given Leyla Barry     03/11/2025 0550 EDT predniSONE tablet 10 mg 10 mg Oral Given Leyla Barry     03/10/2025 1738 EDT atorvastatin (LIPITOR) tablet 80 mg 80 mg Oral Given Indu Donaldson RN     " 03/10/2025 1738 EDT senna-docusate sodium (SENOKOT S) 8.6-50 mg per tablet 1 tablet 1 tablet Oral Not Given Indu Donaldson RN     03/10/2025 0848 EDT senna-docusate sodium (SENOKOT S) 8.6-50 mg per tablet 1 tablet 1 tablet Oral Given Mayra Kirkland RN     03/10/2025 2123 EDT ticagrelor (BRILINTA) tablet 90 mg 90 mg Oral Given Leyla Barry     03/10/2025 0848 EDT ticagrelor (BRILINTA) tablet 90 mg 90 mg Oral Given Mayra Kirkland RN     03/10/2025 1434 EDT bumetanide (BUMEX) injection 2 mg -- Intravenous Unheld by provider Obdulio Adams MD     03/10/2025 0854 EDT bumetanide (BUMEX) injection 2 mg -- Intravenous Held by provider Bennie Zheng DO     03/10/2025 0849 EDT bumetanide (BUMEX) injection 2 mg 2 mg Intravenous Not Given Mayra Kirkland RN     03/11/2025 0549 EDT heparin (porcine) subcutaneous injection 5,000 Units 5,000 Units Subcutaneous Given Leyla Barry     03/10/2025 2126 EDT heparin (porcine) subcutaneous injection 5,000 Units 5,000 Units Subcutaneous Given Leyla Barry     03/10/2025 1413 EDT heparin (porcine) subcutaneous injection 5,000 Units 5,000 Units Subcutaneous Given Indu Donaldson RN     03/10/2025 2131 EDT oxymetazoline (AFRIN) 0.05 % nasal spray 2 spray 2 spray Each Nare Given Leyla Barry     03/10/2025 1738 EDT isosorbide dinitrate (ISORDIL) tablet 10 mg 10 mg Oral Given Indu Donaldson RN     03/10/2025 1158 EDT isosorbide dinitrate (ISORDIL) tablet 10 mg 10 mg Oral Given Indu Donaldson RN     03/10/2025 0850 EDT isosorbide dinitrate (ISORDIL) tablet 10 mg 10 mg Oral Given Mayra Kirkland RN     03/10/2025 1413 EDT hydrALAZINE (APRESOLINE) tablet 10 mg 10 mg Oral Given Indu Donaldson RN     03/10/2025 0849 EDT polyethylene glycol (MIRALAX) packet 17 g 17 g Oral Not Given Mayra Kirkland RN     03/10/2025 1413 EDT colchicine (COLCRYS) tablet 0.6 mg 0.6 mg Oral Given Indu Donaldson RN     03/10/2025 1405 EDT allopurinol (ZYLOPRIM) tablet 100 mg 100 mg Oral Not Given  Indu Donaldson RN     03/10/2025 1451 EDT bumetanide (BUMEX) tablet 3 mg 3 mg Oral Given Indu Donaldson RN     03/10/2025 2124 EDT bumetanide (BUMEX) tablet 3 mg 3 mg Oral Given Leyla Barry     03/10/2025 1451 EDT hydrALAZINE (APRESOLINE) tablet 10 mg 10 mg Oral Given Indu Donaldson RN     03/11/2025 0552 EDT hydrALAZINE (APRESOLINE) tablet 20 mg 20 mg Oral Given Leyla Jhonny     03/10/2025 2124 EDT hydrALAZINE (APRESOLINE) tablet 20 mg 20 mg Oral Given Leyla Jhonny     03/10/2025 2123 EDT melatonin tablet 3 mg 3 mg Oral Given Leylasa Barry            PLEASE NOTE:  This encounter was completed utilizing the [employer] Direct Speech Voice Recognition Software. Grammatical errors, random word insertions, pronoun errors and incomplete sentences are occasional consequences of the system due to software limitations, ambient noise and hardware issues.These may be missed by proof reading prior to affixing electronic signature. Any questions or concerns about the content, text or information contained within the body of this dictation should be directly addressed to the physician for clarification. Please do not hesitate to call me directly if you have any any questions or concerns.

## 2025-03-11 NOTE — PROGRESS NOTES
Received voice message from pt's spouse.    Outpatient [facility] LCSW returned call. Patient stated that spouse left  contact info ([employer] ph# [phone] ext [phone]) for this LCSW to call and find out more info.    LCSW placed call to [employer] however the phone contact listed rings as a fax #.  Called a second time and still rings as fax #.     Called pt's spouse to inform her of the above. She is currently driving and will call the phone # that [employer] called her from after she reaches her destination safely. Then spouse will call this LCSW with updated contact info.    Spouse returned call and left voice messg that phone contact for [employer] was correct.   LCSW tried calling again and Zahira answered phone. Zahira transferred call to Gar[employer].   Garima stated that she did speak with pt's spouse about [facility] [employer] PPO plan under [employer]. There is a 12 month waiting period for any hospitalization or surgery related to heart condition. Hospitalizations for conditions other than heart related will have coverage. [employer] explained that there is no deductible; $25 copay for PCP; $50 copay for Specialists and Urgent Care. Garima stated that pt will have coverage for Outpatient Appointments including heart specialists and this plan covers 80% of medication cost. Garima stated that this plan does not cover home health care.   [employer]ima said that in November 2025, pt can switch to a higher level of care plan through [employer] during Open Enrollment. Pt will not get subsidized rates however because he is not a [place] citizen.      LCSW returned call to pt's spouse. She was driving patient home as he has been discharged. LCSW congratulated pt! Asked if they'd like for me to call them back at a later time however spouse said she could talk now. Updated her about the phone conversation with Garima as outlined above. [facility] PPO through [employer] is also in-network with [facility]. Spouse/patient stated that they  are going to call [employer] once they return home and believe they will choose [employer] PPO plan. Effective date could be as soon as the day after they enroll per phone call with [employer].

## 2025-03-11 NOTE — DISCHARGE SUMMARY
Discharge Summary - Internal Medicine   Name: Rodney Pizarro 60 y.o. male I MRN: [id number]  Unit/Bed#: PPHP-316-01 I Date of Admission: 3/2/2025   Date of Service: 3/11/2025 I Hospital Day: 9    Assessment & Plan  Cardiogenic shock (HCC)  In the setting of ischemic cardiomyopathy and decompensated heart failure  SCAI stage A/B  Currently on milrinone 0.25, SVO 2 of 52.3, hemoglobin of 14  Stefani: CO: 3.3, Cardiac Index: 1.8  Warm and euvolemic  MAP of 82  LFTs improved, kidney function stable  I/O last 24 hours: I 1.1L, O -2.7L, I/O -1.6, Net -10.1L  No significant events noted on telemetry  No further fevers     Plan:  S/p high risk PCI to ostial LCx with Impella support on 3/7.  Awaiting hopeful recovery following revascularization.  Overall volume status is improving, and doing well on 0.25 of milrinone, continue.    Continue Bumex 3 mg 2 times daily.   HF follow up outpatient   Obtain repeat echo next week to evaluate for improvement.  Not currently a candidate for OHT/LVAD.  Not a surgical candidate.  Ischemic dilated cardiomyopathy (HFrEF 20%) due to coronary artery disease  Secondary to prior CAD with mediation noncompliance and new ischemic disease resulting in cardiogenic shock    Relevant echocardiograms  3/2/25 TTE: LV cavity size is dilated. Apical aneurysmal dilatation. No apical thrombus. LVEF 15-20%. Systolic function is severely reduced. Severe global hypokinesis with apical aneurysmal dilatation and akinesis of the mid to apical anterior region. Thinning and akinesia in the basal to mid inferior consistent with transmural myocardial infarction. Diastolic function is severely abnormal, consistent with ungraded restrictive relaxation.      Plan:   Continue inotropic support;   Hydralazine  Previously required milrinone    Afterload reduction:   Isordil 10mg TID   Diuresis as tolerated  Bumex 3mg BID  Obtain follow up echocardiogram tomorrow to evaluate for improvement   Daily weights  Cardiac diet, 2L  fluid restriction  Coronary artery disease involving native coronary artery of native heart with unstable angina pectoris (HCC)  Has prior history of MI in 2011 and 2014 s/p PCI to LAD and RCA  Presented with chest pain diaphoresis and elevated troponin  EKG did not reveal ST elevations, did have inferior Q waves and right bundle branch block     Left heart cath 3/2 revealed 100% proximal RCA lesion, 100% LAD lesion and 90% ostial left circumflex lesion.      S/p high risk PCI to ostial LCx with Impella support on 3/8.  Cath findings also showed 60% ostial LAD lesion and 40% left main lesion.     Continue aspirin and plavix  Continue Atorvastatin  Continue management of heart failure and shock as above.   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)  Long-standing diagnosis   No acute concerns  Will set up with nephrology outpatient      Plan:   Continue home steroid regimen:   Prednisone 10mg qAM  Prednisone 5mg qHS  Outpatient follow up   Acute kidney injury (HCC)  Multifactorial from cardiogenic shock, contrast exposures, possible underlying renal disease given history of Wegener's granulomatosis   Baseline creatinine: Unknown  Initial creatinine: 1.65 (3/2/25)   Last creatinine: 1.87 (3/8/25)      Plan:   Strict q4h I/O monitoring  Monitor UOP without valencia catheter   Continue to follow renal function tests  Severe mitral regurgitation  Likely functional in the setting of dilated cardiomyopathy     Moderate aortic insufficiency  3/2/25 TTE: The aortic valve is trileaflet. The leaflets are severely thickened. The leaflets are severely calcified. There is severely reduced mobility. There is moderate regurgitation. The aortic valve has no significant stenosis      Plan:   Volume removal and medical management   Palliative care encounter  Ongoing consultation and discussion of goals      Gout  History of gout previously on colchicine and allopurinol and has been on indomethacin.  He is not currently  on medications. Does note an acute flare of gout on his right thumb with multiple tophaceous lesions throughout his body.     - Will start colchicine and allopurinol    Admission Date: 3/2/2025 1357  Discharge Date: 25  Admitting Diagnosis: Chest pain [R07.9]  Discharge Diagnosis:   Medical Problems       Resolved Problems  Date Reviewed: 3/6/2025   None         HPI: 60 y.o. with a PMHx of Wegener's granulomatosis, CAD with previous stents to LAD, and gout who presented to [facility] with chest pain that woke him up this morning. The patient states the pain began suddenly and was associated with heavyness in his chest, SOB, diaphoresis, and nausea/emesis. On presentation to ED, patient's EKG did not show any evidence of acute ischemia but a stat echo showed an EF of 20% with G3 diastolic dysfunction. Patient with relative hypotension and mild lactic acidemia so he was transferred to [facility] for HF consultation.      On arrival, the patient endorses 8/10 chest pain/heavyness that radiates to his shoulders. He is also having ongoing SOB, , nausea, and numbness/tingling in both his arms.  He states he has been having some /CARRILLO for the last week that has been getting progressively worse. He does have a hx of previous LAD stents and denies a previous hx of HF but was on DAPT and lasix 80mg BID - but he has not taken these medications in over 4 years. He does currently smoke but denies any recent ETOH use. Retired, lives at home with his wife.     Procedures Performed:   Orders Placed This Encounter   Procedures    Cardiac catheterization    Cardiac catheterization    Central Line       Summary of Hospital Course:    60-year-old male presented to [facility] emergency department on 3/2 with complaints of chest pain. Mentally hypotensive with mild troponin elevation. Echocardiogram was performed which revealed an EF of 15 to 20% which is new for the patient. Decision was made to transfer the patient to [facility]  [facility] cardiac ICU. He was hypotensive and initiated on Levophed infusion. He underwent urgent cardiac catheterization which revealed 100% proximal LAD occlusion at the site of the previous stenting and 90% ostial lesion of the left circumflex. No intervention was performed at that time. He would be very high risk for surgical revascularization given his mitral and aortic valve disease as well. He was initiated on milrinone infusion as well as Bumex infusion with improvement of his hemodynamics. He was seen in consultation by heart failure. He ultimately underwent assisted PCI of the circumflex on 3/7. Since then heart failure has been initiating GDMT and weaning his milrinone infusion. He is not currently a transplant candidate or LVAD candidate secondary to tobacco use and no health insurance. Patient was titrated off milrinone and started on hydralazine, imdur, and 3mg bumex TID oral. He was also noted to have gout flare and was started on colchicine and allopurinol. He will dc with HF, nephrology and PCP follow up.     Significant Findings, Care, Treatment and Services Provided: none    Complications: none    Discharge Day Visit / Exam:   /73   Pulse 102   Temp 97.9 °F (36.6 °C)   Resp 18   Ht 5' 7\" (1.702 m)   Wt 73.9 kg (162 lb 14.7 oz)   SpO2 98%   BMI 25.52 kg/m²   Physical Exam  Vitals and nursing note reviewed.   Constitutional:       General: He is not in acute distress.     Appearance: He is well-developed.   HENT:      Head: Normocephalic and atraumatic.   Eyes:      Conjunctiva/sclera: Conjunctivae normal.   Cardiovascular:      Rate and Rhythm: Normal rate and regular rhythm.      Heart sounds: No murmur heard.  Pulmonary:      Effort: Pulmonary effort is normal. No respiratory distress.      Breath sounds: Normal breath sounds.   Abdominal:      Palpations: Abdomen is soft.      Tenderness: There is no abdominal tenderness.   Musculoskeletal:         General: No swelling.      " Cervical back: Neck supple.   Skin:     General: Skin is warm and dry.      Capillary Refill: Capillary refill takes less than 2 seconds.   Neurological:      Mental Status: He is alert.   Psychiatric:         Mood and Affect: Mood normal.          Discussion with Family: Updated  (wife) at bedside.    Condition at Discharge: good       Discharge instructions/Information to patient and family:   See After Visit Summary (AVS) for information provided to patient and family.      Provisions for Follow-Up Care:  See after visit summary for information related to follow-up care and any pertinent home health orders.      PCP: No primary care provider on file.    Disposition: See After Visit Summary for discharge disposition information.    Planned Readmission: No     Discharge Medications:  See after visit summary for reconciled discharge medications provided to patient and family.      Discharge Statement:  I have spent a total time of 30 minutes in caring for this patient on the day of the visit/encounter. .

## 2025-03-11 NOTE — ASSESSMENT & PLAN NOTE
Secondary to prior CAD with mediation noncompliance and new ischemic disease resulting in cardiogenic shock    Relevant echocardiograms  3/2/25 TTE: LV cavity size is dilated. Apical aneurysmal dilatation. No apical thrombus. LVEF 15-20%. Systolic function is severely reduced. Severe global hypokinesis with apical aneurysmal dilatation and akinesis of the mid to apical anterior region. Thinning and akinesia in the basal to mid inferior consistent with transmural myocardial infarction. Diastolic function is severely abnormal, consistent with ungraded restrictive relaxation.      Plan:   Continue inotropic support;   Hydralazine  Previously required milrinone    Afterload reduction:   Isordil 10mg TID   Diuresis as tolerated  Bumex 3mg BID  Obtain follow up echocardiogram tomorrow to evaluate for improvement   Daily weights  Cardiac diet, 2L fluid restriction

## 2025-03-12 ENCOUNTER — PATIENT OUTREACH (OUTPATIENT)
Dept: CARDIOLOGY CLINIC | Facility: CLINIC | Age: 60
End: 2025-03-12

## 2025-03-12 RX ORDER — ASPIRIN 81 MG/1
81 TABLET, CHEWABLE ORAL DAILY
COMMUNITY
End: 2025-03-13

## 2025-03-12 NOTE — ASSESSMENT & PLAN NOTE
Long-standing diagnosis   No acute concerns  Will set up with nephrology outpatient      Plan:   Continue home steroid regimen:   Prednisone 10mg qAM  Prednisone 5mg qHS  Outpatient follow up    no

## 2025-03-12 NOTE — PROGRESS NOTES
Progress Note - Internal Medicine   Name: Rodney Pizarro 60 y.o. male I MRN: [id number]  Unit/Bed#: PPHP-316-01 I Date of Admission: 3/2/2025   Date of Service: 3/12/2025 I Hospital Day: 9     Assessment & Plan  Cardiogenic shock (HCC)  In the setting of ischemic cardiomyopathy and decompensated heart failure  SCAI stage A/B  Currently on milrinone 0.25, SVO 2 of 52.3, hemoglobin of 14  Stefani: CO: 3.3, Cardiac Index: 1.8  Warm and euvolemic  MAP of 82  LFTs improved, kidney function stable  I/O last 24 hours: I 1.1L, O -2.7L, I/O -1.6, Net -10.1L  No significant events noted on telemetry  No further fevers     Plan:  S/p high risk PCI to ostial LCx with Impella support on 3/7.  Awaiting hopeful recovery following revascularization.  Overall volume status is improving, and doing well on 0.25 of milrinone, continue.    Continue Bumex 3 mg 2 times daily.   HF follow up outpatient   Obtain repeat echo next week to evaluate for improvement.  Not currently a candidate for OHT/LVAD.  Not a surgical candidate.  Ischemic dilated cardiomyopathy (HFrEF 20%) due to coronary artery disease  Secondary to prior CAD with mediation noncompliance and new ischemic disease resulting in cardiogenic shock    Relevant echocardiograms  3/2/25 TTE: LV cavity size is dilated. Apical aneurysmal dilatation. No apical thrombus. LVEF 15-20%. Systolic function is severely reduced. Severe global hypokinesis with apical aneurysmal dilatation and akinesis of the mid to apical anterior region. Thinning and akinesia in the basal to mid inferior consistent with transmural myocardial infarction. Diastolic function is severely abnormal, consistent with ungraded restrictive relaxation.      Plan:   Continue inotropic support;   Hydralazine  Previously required milrinone    Afterload reduction:   Isordil 10mg TID   Diuresis as tolerated  Bumex 3mg BID  Obtain follow up echocardiogram tomorrow to evaluate for improvement   Daily weights  Cardiac diet, 2L  fluid restriction  Coronary artery disease involving native coronary artery of native heart with unstable angina pectoris (HCC)  Has prior history of MI in 2011 and 2014 s/p PCI to LAD and RCA  Presented with chest pain diaphoresis and elevated troponin  EKG did not reveal ST elevations, did have inferior Q waves and right bundle branch block     Left heart cath 3/2 revealed 100% proximal RCA lesion, 100% LAD lesion and 90% ostial left circumflex lesion.      S/p high risk PCI to ostial LCx with Impella support on 3/8.  Cath findings also showed 60% ostial LAD lesion and 40% left main lesion.     Continue aspirin and plavix  Continue Atorvastatin  Continue management of heart failure and shock as above.   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)  Long-standing diagnosis   No acute concerns  Will set up with nephrology outpatient      Plan:   Continue home steroid regimen:   Prednisone 10mg qAM  Prednisone 5mg qHS  Outpatient follow up   Acute kidney injury (HCC)  Multifactorial from cardiogenic shock, contrast exposures, possible underlying renal disease given history of Wegener's granulomatosis   Baseline creatinine: Unknown  Initial creatinine: 1.65 (3/2/25)   Last creatinine: 1.87 (3/8/25)      Plan:   Strict q4h I/O monitoring  Monitor UOP without valencia catheter   Continue to follow renal function tests  Severe mitral regurgitation  Likely functional in the setting of dilated cardiomyopathy     Moderate aortic insufficiency  3/2/25 TTE: The aortic valve is trileaflet. The leaflets are severely thickened. The leaflets are severely calcified. There is severely reduced mobility. There is moderate regurgitation. The aortic valve has no significant stenosis      Plan:   Volume removal and medical management   Palliative care encounter  Ongoing consultation and discussion of goals      Gout  History of gout previously on colchicine and allopurinol and has been on indomethacin.  He is not currently  on medications. Does note an acute flare of gout on his right thumb with multiple tophaceous lesions throughout his body.     - Will start colchicine and allopurinol    NSTEMI- treated.

## 2025-03-12 NOTE — PATIENT INSTRUCTIONS
"Patient Education     Routine physical for adults   The Basics   Written by the doctors and editors at [facility]   What is a physical? -- A physical is a routine visit, or \"check-up,\" with your doctor. You might also hear it called a \"wellness visit\" or \"preventive visit.\"  During each visit, the doctor will:   Ask about your physical and mental health   Ask about your habits, behaviors, and lifestyle   Do an exam   Give you vaccines if needed   Talk to you about any medicines you take   Give advice about your health   Answer your questions  Getting regular check-ups is an important part of taking care of your health. It can help your doctor find and treat any problems you have. But it's also important for preventing health problems.  A routine physical is different from a \"sick visit.\" A sick visit is when you see a doctor because of a health concern or problem. Since physicals are scheduled ahead of time, you can think about what you want to ask the doctor.  How often should I get a physical? -- It depends on your age and health. In general, for people age 21 years and older:   If you are younger than 50 years, you might be able to get a physical every 3 years.   If you are 50 years or older, your doctor might recommend a physical every year.  If you have an ongoing health condition, like diabetes or high blood pressure, your doctor will probably want to see you more often.  What happens during a physical? -- In general, each visit will include:   Physical exam - The doctor or nurse will check your height, weight, heart rate, and blood pressure. They will also look at your eyes and ears. They will ask about how you are feeling and whether you have any symptoms that bother you.   Medicines - It's a good idea to bring a list of all the medicines you take to each doctor visit. Your doctor will talk to you about your medicines and answer any questions. Tell them if you are having any side effects that bother you. You " "should also tell them if you are having trouble paying for any of your medicines.   Habits and behaviors - This includes:   Your diet   Your exercise habits   Whether you smoke, drink alcohol, or use drugs   Whether you are sexually active   Whether you feel safe at home  Your doctor will talk to you about things you can do to improve your health and lower your risk of health problems. They will also offer help and support. For example, if you want to quit smoking, they can give you advice and might prescribe medicines. If you want to improve your diet or get more physical activity, they can help you with this, too.   Lab tests, if needed - The tests you get will depend on your age and situation. For example, your doctor might want to check your:   Cholesterol   Blood sugar   Iron level   Vaccines - The recommended vaccines will depend on your age, health, and what vaccines you already had. Vaccines are very important because they can prevent certain serious or deadly infections.   Discussion of screening - \"Screening\" means checking for diseases or other health problems before they cause symptoms. Your doctor can recommend screening based on your age, risk, and preferences. This might include tests to check for:   Cancer, such as breast, prostate, cervical, ovarian, colorectal, prostate, lung, or skin cancer   Sexually transmitted infections, such as chlamydia and gonorrhea   Mental health conditions like depression and anxiety  Your doctor will talk to you about the different types of screening tests. They can help you decide which screenings to have. They can also explain what the results might mean.   Answering questions - The physical is a good time to ask the doctor or nurse questions about your health. If needed, they can refer you to other doctors or specialists, too.  Adults older than 65 years often need other care, too. As you get older, your doctor will talk to you about:   How to prevent falling at " home   Hearing or vision tests   Memory testing   How to take your medicines safely   Making sure that you have the help and support you need at home  All topics are updated as new evidence becomes available and our peer review process is complete.  This topic retrieved from [employer] on: May 02, 2024.  Topic 293822 Version 1.0  Release: 32.4.3 - C32.122  © 2024 UpToDate, Inc. and/or its affiliates. All rights reserved.  Consumer Information Use and Disclaimer   Disclaimer: This generalized information is a limited summary of diagnosis, treatment, and/or medication information. It is not meant to be comprehensive and should be used as a tool to help the user understand and/or assess potential diagnostic and treatment options. It does NOT include all information about conditions, treatments, medications, side effects, or risks that may apply to a specific patient. It is not intended to be medical advice or a substitute for the medical advice, diagnosis, or treatment of a health care provider based on the health care provider's examination and assessment of a patient's specific and unique circumstances. Patients must speak with a health care provider for complete information about their health, medical questions, and treatment options, including any risks or benefits regarding use of medications. This information does not endorse any treatments or medications as safe, effective, or approved for treating a specific patient. UpToDate, Inc. and its affiliates disclaim any warranty or liability relating to this information or the use thereof.The use of this information is governed by the Terms of Use, available at https://www.woltersSpecifiedByuwer.com/en/know/clinical-effectiveness-terms. 2024© UpToDate, Inc. and its affiliates and/or licensors. All rights reserved.  Copyright   © 2024 UpToDate, Inc. and/or its affiliates. All rights reserved.

## 2025-03-12 NOTE — PROGRESS NOTES
Received return phone call from Tonia at [facility].  [facility] could attach Letter of Medical Necessity from Dr. Moyer dated 3/10/25 to pt's [employer] [facility] application. Tonia requested letter via email at [email or web address]  LCSW will forward letter tomorrow while at [facility].

## 2025-03-12 NOTE — PROGRESS NOTES
Adult Annual Physical  Name: Rodney Pizarro      : 1965      MRN: [id number]  Encounter Provider: Jennifer Hodge PA-C  Encounter Date: 3/13/2025   Encounter department: [facility] [address]    Assessment & Plan  Annual physical exam  -Last PCP in 2018   -Follow up with cardiology appt today  -Labs are all UTD          Cardiogenic shock (HCC)  -S/P PCI to ostial left circumflex artery with impella support on 3/7  -Following with cardiology outpatient  -Currently not candidate for LVAD, not a surgical candidate   -HF team weaned milrinone infusion and started on hydralazine, imdur, and 3mg bumex TID oral.   Orders:    Ambulatory Referral to Internal Medicine    Coronary artery disease involving native coronary artery of native heart with unstable angina pectoris (HCC)  -Hx MI  and , s/p PCI to LAD and RCA   -Cath on 3/2 demonstrated 100% proximal RCA lesion, 100% LAD lesion and 90% ostial left circumflex lesion. S/p PCI to ostial left circumflex artery with impella support on 3/7  -Cont ASA and plavix, and atorvastatin       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)  -Nephrology outpatient consult - going to schedule   -Notes was previously following with rheum in [place] up until 2018, requesting referral   -Prednisone 10 mg qAM and 5 mg qHS  Orders:    Ambulatory Referral to Rheumatology; Future    Ischemic dilated cardiomyopathy (HFrEF 20%) due to coronary artery disease  -3/2/2025 had LVEF 15-20%  -secondary to CAD with med noncompliance and new ischemic disease which resulted in cardiogenic shock   -Follow up with cardiology outpatient  -2L fluid restriction        Prediabetes  -Last A1C 3/3/2025 5.7   -Has been taking prednisone daily for decades   -Discussed low sugar, carb, heart healthy diet        Acute gout involving toe of left foot, unspecified cause  -Notes triggers include seafood and when his prednisone is decreased   -Current gout on  left big toe and left thumb  -he was taken off of colchine due to renal impairment and hx of granulomatosis   -Currently on prednisone, 15 mg total daily   -Going to order 20 mg prednisone x 5 days to take in addition to help with acute gout flare      Orders:    predniSONE 20 mg tablet; Take 1 tablet (20 mg total) by mouth daily for 5 days    Ambulatory Referral to Rheumatology; Future    Palliative care encounter  -Was seen by palliative care while admitted  -F/U appt 3/14/2025       Colon cancer screening declined  -Never done, denies fhx of colorectal cancer, denies changes in stool  -Postponing for 1 yr as he recently discharged from ICU, following with cardiology          Preventive Screenings:  - Diabetes Screening: screening up-to-date  - Cholesterol Screening: screening up-to-date   - Colon cancer screening: risks/benefits discussed   - Lung cancer screening: screening not indicated     Immunizations:  - Immunizations due: Influenza, Prevnar 20, Tdap, Zoster (Shingrix) and Hepatitis A         History of Present Illness     Adult Annual Physical:  Patient presents for annual physical.     Diet and Physical Activity:  - Diet/Nutrition: well balanced diet and heart healthy (low sodium) diet. Notes low sodium diet now  - Exercise: walking and 5-7 times a week on average.    Depression Screening:  - PHQ-2 Score: 0    General Health:  - Sleep: sleeps poorly. Notes trouble sleeping since being discharged. Going to try melatonin  - Hearing: normal hearing bilateral ears.  - Vision: wears glasses and most recent eye exam < 1 year ago.  - Dental:. Has dental implants     Health:  - History of STDs: no.   - Urinary symptoms: none.     Review of Systems   Constitutional:  Negative for chills, fatigue and fever.   HENT:  Negative for congestion, ear pain, rhinorrhea, sinus pain and sore throat.    Eyes:  Negative for pain.   Respiratory:  Negative for cough and shortness of breath.    Cardiovascular:  Negative for  "chest pain and leg swelling.   Gastrointestinal:  Negative for abdominal pain, constipation, diarrhea, nausea and vomiting.   Genitourinary:  Negative for difficulty urinating, dysuria, frequency and urgency.   Musculoskeletal:  Positive for joint swelling. Negative for neck pain.        Left big toe pain and swelling   Skin:  Negative for rash.        Multiple ecchymosis on abdomen, b/l upper arms    Neurological:  Negative for dizziness and headaches.   Psychiatric/Behavioral:  Negative for sleep disturbance.          Objective   Temp 97.8 °F (36.6 °C)   Ht 5' 7\" (1.702 m)   Wt 76.2 kg (168 lb)   BMI 26.31 kg/m²     Physical Exam  Vitals reviewed.   Constitutional:       General: He is not in acute distress.     Appearance: Normal appearance.   HENT:      Head: Normocephalic and atraumatic.      Right Ear: Tympanic membrane, ear canal and external ear normal.      Left Ear: Tympanic membrane, ear canal and external ear normal.      Nose: Nose normal.      Mouth/Throat:      Mouth: Mucous membranes are moist.   Eyes:      Extraocular Movements: Extraocular movements intact.      Conjunctiva/sclera: Conjunctivae normal.   Cardiovascular:      Rate and Rhythm: Normal rate and regular rhythm.      Heart sounds: Normal heart sounds. No murmur heard.  Pulmonary:      Effort: Pulmonary effort is normal.      Breath sounds: Normal breath sounds. No wheezing, rhonchi or rales.   Abdominal:      General: Bowel sounds are normal. There is no distension.      Palpations: Abdomen is soft.      Tenderness: There is no abdominal tenderness.   Musculoskeletal:      Cervical back: Neck supple.      Right lower leg: No edema.      Left lower leg: No edema.      Left foot: Swelling and tenderness present.      Comments: Swelling and tenderness of left big toe    Lymphadenopathy:      Cervical: No cervical adenopathy.   Skin:     General: Skin is warm.      Capillary Refill: Capillary refill takes less than 2 seconds.      " Findings: Ecchymosis present. No rash.          Neurological:      Mental Status: He is alert. Mental status is at baseline.           Jennifer Hodge PA-C  Counts include 234 beds at the [facility]  3/13/2025 12:07 PM

## 2025-03-13 ENCOUNTER — PATIENT OUTREACH (OUTPATIENT)
Dept: CASE MANAGEMENT | Facility: HOSPITAL | Age: 60
End: 2025-03-13

## 2025-03-13 ENCOUNTER — OFFICE VISIT (OUTPATIENT)
Dept: FAMILY MEDICINE CLINIC | Facility: CLINIC | Age: 60
End: 2025-03-13
Payer: COMMERCIAL

## 2025-03-13 ENCOUNTER — TRANSITIONAL CARE MANAGEMENT (OUTPATIENT)
Dept: FAMILY MEDICINE CLINIC | Facility: CLINIC | Age: 60
End: 2025-03-13

## 2025-03-13 ENCOUNTER — TELEPHONE (OUTPATIENT)
Dept: CARDIOLOGY CLINIC | Facility: CLINIC | Age: 60
End: 2025-03-13

## 2025-03-13 ENCOUNTER — TELEPHONE (OUTPATIENT)
Age: 60
End: 2025-03-13

## 2025-03-13 ENCOUNTER — PATIENT OUTREACH (OUTPATIENT)
Dept: CARDIOLOGY CLINIC | Facility: CLINIC | Age: 60
End: 2025-03-13

## 2025-03-13 ENCOUNTER — TELEPHONE (OUTPATIENT)
Dept: PALLIATIVE MEDICINE | Facility: CLINIC | Age: 60
End: 2025-03-13

## 2025-03-13 ENCOUNTER — OFFICE VISIT (OUTPATIENT)
Dept: CARDIOLOGY CLINIC | Facility: CLINIC | Age: 60
End: 2025-03-13
Payer: COMMERCIAL

## 2025-03-13 VITALS
HEART RATE: 100 BPM | SYSTOLIC BLOOD PRESSURE: 100 MMHG | WEIGHT: 164 LBS | BODY MASS INDEX: 25.74 KG/M2 | OXYGEN SATURATION: 98 % | DIASTOLIC BLOOD PRESSURE: 68 MMHG | HEIGHT: 67 IN | RESPIRATION RATE: 16 BRPM

## 2025-03-13 VITALS — BODY MASS INDEX: 26.37 KG/M2 | WEIGHT: 168 LBS | TEMPERATURE: 97.8 F | HEIGHT: 67 IN

## 2025-03-13 DIAGNOSIS — I25.110 CORONARY ARTERY DISEASE INVOLVING NATIVE CORONARY ARTERY OF NATIVE HEART WITH UNSTABLE ANGINA PECTORIS (HCC): ICD-10-CM

## 2025-03-13 DIAGNOSIS — M31.31 GRANULOMATOSIS WITH POLYANGIITIS WITH RENAL INVOLVEMENT (HCC): ICD-10-CM

## 2025-03-13 DIAGNOSIS — Z53.20 COLON CANCER SCREENING DECLINED: ICD-10-CM

## 2025-03-13 DIAGNOSIS — R57.0 CARDIOGENIC SHOCK (HCC): ICD-10-CM

## 2025-03-13 DIAGNOSIS — I25.5 ISCHEMIC DILATED CARDIOMYOPATHY DUE TO CORONARY ARTERY DISEASE: ICD-10-CM

## 2025-03-13 DIAGNOSIS — M10.9 ACUTE GOUT INVOLVING TOE OF LEFT FOOT, UNSPECIFIED CAUSE: ICD-10-CM

## 2025-03-13 DIAGNOSIS — R73.03 PREDIABETES: ICD-10-CM

## 2025-03-13 DIAGNOSIS — Z51.5 PALLIATIVE CARE ENCOUNTER: ICD-10-CM

## 2025-03-13 DIAGNOSIS — I25.10 ISCHEMIC DILATED CARDIOMYOPATHY DUE TO CORONARY ARTERY DISEASE: ICD-10-CM

## 2025-03-13 DIAGNOSIS — R57.0 CARDIOGENIC SHOCK (HCC): Primary | ICD-10-CM

## 2025-03-13 DIAGNOSIS — Z00.00 ANNUAL PHYSICAL EXAM: Primary | ICD-10-CM

## 2025-03-13 PROCEDURE — 99386 PREV VISIT NEW AGE 40-64: CPT

## 2025-03-13 PROCEDURE — 99214 OFFICE O/P EST MOD 30 MIN: CPT

## 2025-03-13 PROCEDURE — 99244 OFF/OP CNSLTJ NEW/EST MOD 40: CPT | Performed by: STUDENT IN AN ORGANIZED HEALTH CARE EDUCATION/TRAINING PROGRAM

## 2025-03-13 RX ORDER — PREDNISONE 20 MG/1
20 TABLET ORAL DAILY
Qty: 5 TABLET | Refills: 0 | Status: SHIPPED | OUTPATIENT
Start: 2025-03-13 | End: 2025-03-18 | Stop reason: SDUPTHER

## 2025-03-13 NOTE — ASSESSMENT & PLAN NOTE
-3/2/2025 had LVEF 15-20%  -secondary to CAD with med noncompliance and new ischemic disease which resulted in cardiogenic shock   -Follow up with cardiology outpatient  -2L fluid restriction

## 2025-03-13 NOTE — ASSESSMENT & PLAN NOTE
-Nephrology outpatient consult - going to schedule   -Notes was previously following with rheum in [place] up until 2018, requesting referral   -Prednisone 10 mg qAM and 5 mg qHS  Orders:    Ambulatory Referral to Rheumatology; Future

## 2025-03-13 NOTE — ASSESSMENT & PLAN NOTE
-Notes triggers include seafood and when his prednisone is decreased   -Current gout on left big toe and left thumb  -he was taken off of colchine due to renal impairment and hx of granulomatosis   -Currently on prednisone, 15 mg total daily   -Going to order 20 mg prednisone x 5 days to take in addition to help with acute gout flare      Orders:    predniSONE 20 mg tablet; Take 1 tablet (20 mg total) by mouth daily for 5 days    Ambulatory Referral to Rheumatology; Future

## 2025-03-13 NOTE — TELEPHONE ENCOUNTER
Symptoms:  -leg swelling []  -abdominal bloating, distension, pants fitting tighter []    -loss of appetite, feeling full sooner than usual []  -worsening shortness of breath/CARRILLO, activity intolerance[]  -difficulty lying flat, waking up a night feeling breathless, using more pillows, sleeping in a recliner []  -palpitations []  -chest pain/pressure []  -new or worsening cough []  -unusual fatigue [x]    Comments:gout pain, sob        Weight:   -weighs self daily []Educated on daily wts in the am after bladder empty  -dry/target weight _____  -today's weight __168 lbs at OV___  -understands what to do for rapid weight gain, ie 3lbs in 24 hours or 5 lbs in one week[x]    Comments: they will start daily wts tomorrow and record.  EdUcated on when to call the office.  IV lasix available in the office        Diet:   -consuming any high sodium foods (canned soups, lunch meats, fast food/take out, snack food, prepared foods, sport drinks) yes[]no[x]  - fluid consumed per day-    64       oz   -2g (2000 mg) Sodium, 2L (2000 ml, around 60-64 oz) fluid restriction] reinforced [x]      Comments:educated on         Medications:  -med list reviewed [x]   -missed doses or taking a different dose than prescribed?  yes[]no[x]  -still urinates well on current diuretic ? yes[x]no[]  -using O2 as directed? yes[]no[]    Comments:      Home vital signs: (if available)  BP ____ educated on taking daily and recording  HR____  Pulse ox____    Recent labs: none  BMP/CMP -   BNP, NT Pro BNP-  CBC-    Device check: N/A  Arrhythmia burden ?     Optivol/Corvue crossed ?      Other possible triggers ?:none  Recent viral symptoms/infection []  NSAID use []  Steroids []  Anemia []  COPD/hypoxia []  Not using CPAP/BiPAP []    Comments:Self-management/education and teach back:  Primary learner: self and spouse  Following low sodium diet: educated on  Following fluid restriction:educated on  Hospital discharge weight: 162 14.7 lbs  Weighing daily:       educated on      Recordinst home weight       Weight today:  Monitoring symptoms: y  Any current symptoms: fatigue sob  Knows when to call provider: educated on  Medication reviewed and taking all as prescribed: is not taking allopurinol. He feels that has caused the gout pain.  Once pain under control, he will try it again.  Knows name of diuretic: educated on  Escalation plan: none  HF education reviewed/reinforced including low sodium diet, 64 oz fluid restriction, activity, symptoms of decompensation and when and who to call.     Care Coordination:  Aware of cardiology follow up appointment: today  they have to leave now  Aware of PCP follow up appointment:today  Transportation:y  Social Support:y  wife  Insurance/financial concerns:n  Home health care: no  Health literacy:fair  Engagement:fair  had to leave for appt.  Personal Goal:  Additional comments: they are fine with me calling weekly for a mth to get updates.

## 2025-03-13 NOTE — TELEPHONE ENCOUNTER
Patient referred to Nephrology for SEAN     [facility] states to get patient in within 7-10 days and if nothing available transfer to [facility].      First available appointment at any location is not until 4/7/2025.      Please advise.

## 2025-03-13 NOTE — TELEPHONE ENCOUNTER
I called and spoke to the patient wife Evi and schedule the patient for a Nephrology Consult appointment. Patient was also added to the wait list. Patient wife understood and was okay with the day and time of the patient next appointment.

## 2025-03-13 NOTE — ASSESSMENT & PLAN NOTE
-Hx MI 2011 and 2014, s/p PCI to LAD and RCA   -Cath on 3/2 demonstrated 100% proximal RCA lesion, 100% LAD lesion and 90% ostial left circumflex lesion. S/p PCI to ostial left circumflex artery with impella support on 3/7  -Cont ASA and plavix, and atorvastatin

## 2025-03-13 NOTE — ASSESSMENT & PLAN NOTE
-S/P PCI to ostial left circumflex artery with impella support on 3/7  -Following with cardiology outpatient  -Currently not candidate for LVAD, not a surgical candidate   -HF team weaned milrinone infusion and started on hydralazine, imdur, and 3mg bumex TID oral.   Orders:    Ambulatory Referral to Internal Medicine

## 2025-03-13 NOTE — ASSESSMENT & PLAN NOTE
Patient recently hospitalized in setting of AMI cardiogenic shock status post PCI to OM1 currently on DAPT.  Patient did require inotrope support during hospitalization which was able to be weaned off.  Patient also has concomitant severe mitral digitation which is likely secondary to ischemic cardiomyopathy/dilated cardiomyopathy.  Patient is currently euvolemic on exam, was initially not deemed a candidate for MCS/further therapies however per patient and his wife they are now insured.  Will give expedited heart failure referral for any further therapies.  Currently on exam the patient is euvolemic, but does get significant short of breath with movement.  Given recent intervention patient was ordered for follow-up surveillance echocardiography which I think is reasonable.  Tolerating current dose of Bumex with weight stable from most recent discharge.  Will continue with DAPT, statin, hydralazine, Isordil.

## 2025-03-13 NOTE — PROGRESS NOTES
[facility] Cardiology  Follow up note  Rodney Pizarro 60 y.o. male MRN: [id number]        1. Cardiogenic shock (HCC)  -     Ambulatory Referral to Cardiology; Future  2. Ischemic dilated cardiomyopathy (HFrEF 20%) due to coronary artery disease  -     Ambulatory Referral to Cardiology; Future      Assessment & Plan  Cardiogenic shock (HCC)  Patient recently hospitalized in setting of AMI cardiogenic shock status post PCI to OM1 currently on DAPT.  Patient did require inotrope support during hospitalization which was able to be weaned off.  Patient also has concomitant severe mitral digitation which is likely secondary to ischemic cardiomyopathy/dilated cardiomyopathy.  Patient is currently euvolemic on exam, was initially not deemed a candidate for MCS/further therapies however per patient and his wife they are now insured.  Will give expedited heart failure referral for any further therapies.  Currently on exam the patient is euvolemic, but does get significant short of breath with movement.  Given recent intervention patient was ordered for follow-up surveillance echocardiography which I think is reasonable.  Tolerating current dose of Bumex with weight stable from most recent discharge.  Will continue with DAPT, statin, hydralazine, Isordil.  Ischemic dilated cardiomyopathy (HFrEF 20%) due to coronary artery disease  See above    HPI:   60-year-old gentleman with stage D heart failure, EF of 15 to 20%, MI in 2011, 2014 status post PCI, history of granulomatosis with polyangiitis, severe MR, moderate AI presented with unstable angina with proximal LAD occlusion of prior stent who underwent Impella assisted PCI to ostial left circumflex required milrinone support who is deemed not a candidate for LVAD or transplant.  Patient had left heart catheterization 3/2 which showed 100% proximal RCA lesion, 100% LAD reason, 90% ostial left circumflex, underwent high risk PCI of the ostial left circumflex 3 8, also a 60%  ostial LAD reasonable, 40% left main (thrombus), patient has severe secondary mitral regurgitation, moderate AI with low-flow low gradient AS, on prednisone for granulomatosis with polyangiitis with SEAN    Recent lab work reviewed, sodium of 131, potassium 3.7, chloride 91, BUN of 50, creatinine 1.98    Patient has upcoming follow-up with rheumatology, nephrology, and palliative care.  Patient is now insured given referral to heart failure with plan for surveillance echocardiography.  Patient does report significant amount of shortness of breath with minimal exertion, has a pulse oximeter at home but reports that he is saturating well on room air.  Blood pressure today is stable from discharge, and he is euvolemic on exam.      Review of Systems    Past Medical History:   Diagnosis Date    Heart attack (HCC)     Wegener's granulomatosis with renal involvement (HCC)      Social History     Substance and Sexual Activity   Alcohol Use Never     Social History     Substance and Sexual Activity   Drug Use Never     Social History     Tobacco Use   Smoking Status Every Day    Current packs/day: 0.25    Types: Cigarettes   Smokeless Tobacco Never       Allergies:  Allergies   Allergen Reactions    Sulfamethoxazole-Trimethoprim Rash       Medications:     Current Outpatient Medications:     aspirin 81 mg chewable tablet, Chew 1 tablet (81 mg total) daily, Disp: 30 tablet, Rfl: 0    atorvastatin (LIPITOR) 80 mg tablet, Take 1 tablet (80 mg total) by mouth daily with dinner, Disp: 30 tablet, Rfl: 0    bumetanide (BUMEX) 1 mg tablet, Take 3 tablets (3 mg total) by mouth 2 (two) times a day, Disp: 180 tablet, Rfl: 0    clopidogrel (Plavix) 75 mg tablet, Take 1 tablet (75 mg total) by mouth daily, Disp: 30 tablet, Rfl: 0    ESOMEPRAZOLE MAGNESIUM PO, Take 1 tablet by mouth daily, Disp: , Rfl:     hydrALAZINE (APRESOLINE) 10 mg tablet, Take 2 tablets (20 mg total) by mouth every 8 (eight) hours, Disp: 180 tablet, Rfl: 0     "isosorbide dinitrate (ISORDIL) 10 mg tablet, Take 1 tablet (10 mg total) by mouth 3 (three) times daily after meals, Disp: 90 tablet, Rfl: 0    predniSONE 20 mg tablet, Take 1 tablet (20 mg total) by mouth daily for 5 days (Patient taking differently: Take 35 mg by mouth daily 35 mg daily), Disp: 5 tablet, Rfl: 0    allopurinol (ZYLOPRIM) 100 mg tablet, Take 0.5 tablets (50 mg total) by mouth daily (Patient not taking: Reported on 3/13/2025), Disp: 15 tablet, Rfl: 0      Vitals:    03/13/25 1610   BP: 100/68   Pulse: 100   Resp: 16   SpO2: 98%     Weight (last 2 days)       Date/Time Weight    03/13/25 1610 74.4 (164)          Physical Exam  Constitutional:       Appearance: He is ill-appearing.   Cardiovascular:      Heart sounds: Murmur heard.   Pulmonary:      Effort: Pulmonary effort is normal.      Breath sounds: Normal breath sounds.   Musculoskeletal:      Right lower leg: No edema.      Left lower leg: No edema.   Skin:     Findings: Bruising present.         Laboratory Studies:    Laboratory studies personally reviewed    Cardiac testing:     See above         Aldo Brody MD    Portions of the record may have been created with voice recognition software.  Occasional wrong word or \"sound a like\" substitutions may have occurred due to the inherent limitations of voice recognition software.  Read the chart carefully and recognize, using context, where substitutions have occurred.   "

## 2025-03-14 ENCOUNTER — OFFICE VISIT (OUTPATIENT)
Dept: PALLIATIVE MEDICINE | Facility: CLINIC | Age: 60
End: 2025-03-14

## 2025-03-14 VITALS
BODY MASS INDEX: 27.06 KG/M2 | HEART RATE: 66 BPM | HEIGHT: 67 IN | WEIGHT: 172.4 LBS | RESPIRATION RATE: 16 BRPM | TEMPERATURE: 97.2 F | OXYGEN SATURATION: 100 %

## 2025-03-14 DIAGNOSIS — Z91.89 AT RISK FOR ADVERSE DRUG EVENT: ICD-10-CM

## 2025-03-14 DIAGNOSIS — R09.89 AIR HUNGER: ICD-10-CM

## 2025-03-14 DIAGNOSIS — I25.10 ISCHEMIC DILATED CARDIOMYOPATHY DUE TO CORONARY ARTERY DISEASE: Primary | ICD-10-CM

## 2025-03-14 DIAGNOSIS — Z51.5 PALLIATIVE CARE ENCOUNTER: ICD-10-CM

## 2025-03-14 DIAGNOSIS — M31.31 GRANULOMATOSIS WITH POLYANGIITIS WITH RENAL INVOLVEMENT (HCC): ICD-10-CM

## 2025-03-14 DIAGNOSIS — I25.5 ISCHEMIC DILATED CARDIOMYOPATHY DUE TO CORONARY ARTERY DISEASE: Primary | ICD-10-CM

## 2025-03-14 DIAGNOSIS — G47.01 INSOMNIA DUE TO MEDICAL CONDITION: ICD-10-CM

## 2025-03-14 DIAGNOSIS — R52 ACUTE PAIN: ICD-10-CM

## 2025-03-14 PROCEDURE — 99417 PROLNG OP E/M EACH 15 MIN: CPT | Performed by: STUDENT IN AN ORGANIZED HEALTH CARE EDUCATION/TRAINING PROGRAM

## 2025-03-14 PROCEDURE — 99215 OFFICE O/P EST HI 40 MIN: CPT | Performed by: STUDENT IN AN ORGANIZED HEALTH CARE EDUCATION/TRAINING PROGRAM

## 2025-03-14 RX ORDER — OXYCODONE HYDROCHLORIDE 5 MG/1
2.5 TABLET ORAL 3 TIMES DAILY PRN
Qty: 45 TABLET | Refills: 0 | Status: SHIPPED | OUTPATIENT
Start: 2025-03-14 | End: 2025-03-18

## 2025-03-14 NOTE — TELEPHONE ENCOUNTER
"Pt spouse called reporting that [facility] was only able to dispense a 7 days supply of the oxycodone instead of the 30 days supply that was ordered due to pharmacist said \"it was his first time if getting this medication.\" Pt will need another prescription for the remaining days supply. Pt will call back around Tuesday to find office.   "

## 2025-03-14 NOTE — ASSESSMENT & PLAN NOTE
Stage D, LVEF of 15%  Recent cardiogenic shock in setting of CAD/ischemic CM/dilated CM  Required inotropic support in the hospital  Not a candidate for LVAD or transplantation  Orders:    Ambulatory Referral to Sleep Medicine; Future

## 2025-03-14 NOTE — ASSESSMENT & PLAN NOTE
Palliative diagnosis: End stage CHF  PPS: 60-70%    Education/counseling provided on role/purpose of palliative care; benefits/risks of treatment options by our team reviewed; instructions for management and anticipatory guidance provided; supportive listening and presence provided; provided space for life review and whole person assessment    Communicated and coordinated with Palliative LSW Reta Hill; Pulmnology and Sleep Medicine Jillian Montague MD    Follow-up planning: Recommending close follow-up in approximately 1 month

## 2025-03-14 NOTE — PROGRESS NOTES
Palliative Outpatient Assessment of Need    LSW completed an assessment of need which was completed with patient and his wife in the office along with Dr. Ferguson. Pt reports pain from bruising and difficulty breathing.    Relationship status:   Duration of relationship: 38 years  Name of significant other: Evi  Children and Ages: 2, one of whom lives with family along with 2 GC ages 5 and 7  Pets: none  Other important family information: from [place] and [place]  Living situation (where and whom): at home with extended family    Patient's primary caregiver:  spouse  Any limitations of caregiver: none  Environmental concerns or barriers: 2 story home   history: NA  Employment history/source of income:   Disability:  NA  Concerns regarding literacy: NA  Spirituality/ Mormon:  Sikhism  Patient's strengths, social supports, and resources: supportive family  Cultural information:  Sikhism, from [facility] current or previous: anxious  Substance use or history: NA  Sleep: very poor  Exercise: limited  Diet/nutrition: taste has affected his diet negatively  Durable Medical Equipment needs: N  Transportation: pt and spouse drive  Financial concerns: no SS, pt retired  Advanced Directive:   Other medical or social work providers involved: HF SW  Patient/caregiver current level of coping:  coping well, positive outlook  Understanding: understands illness is terminal  Patient/family concerns and areas of need: oxygen  Patient's interests: family, spending time outside    I have spent 30 minutes with Patient and family today in which greater than 50% of this time was spent in counseling/coordination of care.

## 2025-03-14 NOTE — ASSESSMENT & PLAN NOTE
Estimated Creatinine Clearance: 37.6 mL/min (A) (by C-G formula based on SCr of 1.98 mg/dL (H)).     Following with Nephrology and Rheumatology  On corticosteroid therapy

## 2025-03-17 NOTE — TELEPHONE ENCOUNTER
Patients spouse called in stating they only received   predniSONE 20 mg tablet  for him to take for 5 days. She was told he was suppose to take 40 mg for 5 days and then lessen the medication. She is wanting clarity on this?       She would like this script to go to: [facility]/pharmacy #[id number] - [place] - [phone] not [facility]

## 2025-03-17 NOTE — TELEPHONE ENCOUNTER
Patients spouse called to schedule per referral. Call was disconnected when transferring to sleep medicine.

## 2025-03-18 NOTE — TELEPHONE ENCOUNTER
I will send 10 mg prednisone x 5 days. If symptoms are not improving, we will need to re evaluate.     Jennifer Hodge PA-C  [facility]  3/18/2025 10:41 AM

## 2025-03-18 NOTE — ED NOTES
Per Sadie at [facility], patient is candidate for tissue and cornea donation. At this time, patient is ok to be transferred to [facility]  03/18/25 1524

## 2025-03-18 NOTE — PROGRESS NOTES
I responded to code blue. When I arrived ACLS was in progress. I helped assist with resuscitation efforts. After about 8 minutes he remained in PEA arrest and I performed subcostal 4C TTE and there was electromechanical dissociation with no reversible cause identified. We did place patient on Jeanmarie device and continued resuscitation. I assisted with family conversation and discussed with Evi that Rodney went into cardiac arrest and that we were still continuing resuscitation efforts but that I was concerned we would not be able to establish ROSC. Resuscitation efforts continued at one point he did go from PEA arrest to VT/VF and was defibrillated successfully but pulses were only present briefly and PEA arrests continued. Resuscitation efforts terminated after >20 minutes. Dr. Jimenez and I updated Evi that her  Rodney had passed away. We offered our support and helped communicate via telephone with several family members. Pastoral services offered. Plan to bring Rodney to a private room and allow family to see him and grieve privately. Bereavement cart ordered. I spent a total of 30 minutes assisting with medical care during code and updating [place].

## 2025-03-18 NOTE — ED PROVIDER NOTES
Time reflects when diagnosis was documented in both MDM as applicable and the Disposition within this note       Time User Action Codes Description Comment    3/18/2025  4:02 PM Elli Jimenez Add [I46.9] Cardiopulmonary arrest (HCC)     3/18/2025  4:02 PM Elli Jimenez E Add [I50.9] Multi-organ failure with heart failure (HCC)     3/18/2025  4:03 PM Elli Jimenez E Add [N17.9,  N18.9] Acute on chronic renal failure  (HCC)     3/18/2025  4:03 PM Elli Jimenez E Add [E87.20] Lactic acidosis     3/18/2025  4:04 PM Elli Jimenez E Add [R09.02] Hypoxia     3/18/2025  4:04 PM Elli Jimenez E Add [E87.1] Acute hyponatremia           ED Disposition       ED Disposition       Condition   --    Date/Time   Tue Mar 18, 2025  4:02 PM    Comment   --             Assessment & Plan       Medical Decision Making  60-year-old male with significant cardiac history including known obstructive CAD to high risk for stenting and status post Impella placement in early 2025 at [facility], cardiogenic shock, CHF with significant low EF at baseline, presents to the ED for acute burning epigastric pain radiating to the back associate with diaphoresis.  Patient also reports chest pain and dyspnea.  Patient appears very ill on arrival with borderline low blood pressure in the left arm and unable to obtain manual or automatic BP in the right upper extremity.  Patient also appears dusky with cool extremities and concern for hypoperfusion, likely secondary to cardiogenic shock.  Small fluid bolus ordered for hemodynamic support, 500 cc NS.  Will also start Levophed for hemodynamic support as patient is high risk for excessive fluid administration due to significant low EF of 15%.  Based on his symptoms, concern for acute coronary syndrome, aortic dissection, pericardial effusion.  Once hemodynamically optimized and stable, will pursue CTA dissection study of the chest, abdomen and pelvis.    Patient was placed on nonrebreather and  nasal cannula oxygen for transfer to CT scan.  While laying flat for CT scan, patient desatted from high 80s to low 60s while on nonrebreather and 6 L nasal cannula oxygen.  Patient immediately taken out of CT scan despite unable to finish CT or administer IV contrast.  Patient was unresponsive at that time and decision made to intubate for airway protection.  While setting up for intubation, patient lost pulses and CPR was started while in CT scan.  Rapid response called and critical care team assisted with resuscitative efforts.  After multiple rounds of epinephrine, bicarb, calcium and after intubation, patient remained in PEA.  No cardiac activity seen on bedside cardiac ultrasound.  Joint decision made by myself and critical care attending, Dr. Blevins, to cease resuscitative efforts after 30 minutes of CPR and resuscitative efforts.  Myself and critical care attending updated patient's wife and family over the phone in family waiting room where deepest condolences were given.     Amount and/or Complexity of Data Reviewed  Independent Historian: EMS  External Data Reviewed:      Details: Reviewed recent provider notes, recent cath report, echo from March 2025 [facility] admission.  Labs: ordered. Decision-making details documented in ED Course.  Radiology: ordered and independent interpretation performed. Decision-making details documented in ED Course.  ECG/medicine tests: ordered and independent interpretation performed. Decision-making details documented in ED Course.    Risk  Prescription drug management.             Medications   ondansetron (ZOFRAN) injection 4 mg (4 mg Intravenous Given 3/18/25 1449)   fentaNYL injection 50 mcg (25 mcg Intravenous Given 3/18/25 1903)   sodium chloride 0.9 % bolus 500 mL (0 mL Intravenous Stopped 3/18/25 1543)   hydrocortisone (Solu-CORTEF) injection 100 mg (100 mg Intravenous Given 3/18/25 1515)   etomidate (AMIDATE) 2 mg/mL injection (20 mg Intravenous Given 3/18/25 1521)    Succinylcholine Chloride 100 mg/5 mL syringe (50 mg Intravenous Given 3/18/25 1522)   EPINEPHrine (ADRENALIN) injection (1 mg Intravenous Given 3/18/25 1542)   sodium bicarbonate 50 mEq/50 mL injection (50 mEq Intravenous Given 3/18/25 1542)   atropine 1 mg/10 mL injection (0.5 mg Intravenous Given 3/18/25 1542)   amiodarone 150 mg/3 mL injection (300 mg Intravenous Given 3/18/25 1540)   calcium chloride 1 g/10 mL injection (1 g Intravenous Given 3/18/25 1525)       ED Risk Strat Scores                            SBIRT 20yo+      Flowsheet Row Most Recent Value   Initial Alcohol Screen: US AUDIT-C     1. How often do you have a drink containing alcohol? 0 Filed at: 03/18/2025 1432   2. How many drinks containing alcohol do you have on a typical day you are drinking?  0 Filed at: 03/18/2025 1432   3a. Male UNDER 65: How often do you have five or more drinks on one occasion? 0 Filed at: 03/18/2025 1432   Audit-C Score 0 Filed at: 03/18/2025 1432   JASMINA: How many times in the past year have you...    Used an illegal drug or used a prescription medication for non-medical reasons? Never Filed at: 03/18/2025 1432                            History of Present Illness       Chief Complaint   Patient presents with    Chest Pain     Pt arrives from home c/o chest pain that started this AM when brushing his teeth. [facility], d/c Tuesday.        Past Medical History:   Diagnosis Date    Heart attack (HCC)     Wegener's granulomatosis with renal involvement (HCC)       Past Surgical History:   Procedure Laterality Date    CARDIAC CATHETERIZATION N/A 3/2/2025    Procedure: Cardiac Coronary Angiogram;  Surgeon: Garry Elizabeth MD;  Location: [facility];  Service: Cardiology    CARDIAC CATHETERIZATION  3/2/2025    Procedure: Heart Catherization;  Surgeon: Garry Elizabeth MD;  Location: [facility];  Service: Cardiology    CARDIAC CATHETERIZATION N/A 3/7/2025    Procedure: Cardiac  Impella Insertion;  Surgeon: León Rosario MD;  Location: [facility];  Service: Cardiology    CARDIAC CATHETERIZATION N/A 3/7/2025    Procedure: Cardiac PCI;  Surgeon: León Rosario MD;  Location: [facility];  Service: Cardiology      No family history on file.   Social History     Tobacco Use    Smoking status: Every Day     Current packs/day: 0.25     Types: Cigarettes    Smokeless tobacco: Never   Vaping Use    Vaping status: Never Used   Substance Use Topics    Alcohol use: Never    Drug use: Never      E-Cigarette/Vaping    E-Cigarette Use Never User       E-Cigarette/Vaping Substances      I have reviewed and agree with the history as documented.     Patient is a 60-year-old male with past medical history significant for Wegener's granulomatosis with renal involvement, coronary artery disease and prior LAD stent, recent hospitalization at [facility] in early March 2025 (2 weeks ago) for acute coronary syndrome/unstable angina, found to have 100 % proximal LAD occlusion at the site of previous stenting, 80% ostial LAD lesion, large L Cx artery with a 90 % ostial lesion, too high risk for stenting on 3/2/25, status post Impella, ischemic cardiomyopathy and cardiogenic shock, with most recent EF 15%, just discharged from [facility] on 3/11, presents to the emergency department by ambulance from outpatient pulmonology office for chest pain.  Per EMS, patient had been complaining of chest pain which patient localizes to the epigastric region and states it is burning and severe and radiates to his back.  He reports nausea and feels as though he has to have a bowel movement but has yet to have a bowel movement.  He reports breaking into a sweat with the pain.  Patient also reports feeling short of breath.  Prehospital, patient did receive 324 mg aspirin.  They were unable to obtain blood pressure or O2 sat prehospital.  EMS noted dusky color to extremities but patient mentating normally.  Patient reports he  takes aspirin and Plavix daily but denies being on any anticoagulant agents.  He denies any recent fevers or chills, cough or URI symptoms, hemoptysis, palpitations, headache, vertigo, syncope, lower abdominal pain, vomiting, blood per rectum or melena, urinary symptoms, skin rash, focal neurologic deficits.  He does report increased bilateral leg edema especially in his feet and ankles since hospital discharge.  Patient currently complaining of feeling very thirsty and requesting ice chips.  On arrival to the ED, patient appears generally ill.  He is gray in color, alert and oriented x 3.  Heart rate mildly bradycardic in the 50s.  Unable to obtain O2 sat on bilateral hands or bilateral ears.  The best O2 sat was 86% with moderately poor Pleth.  Patient placed on nonrebreather 15 L as well as nasal cannula 6 L for oxygenation support.  Patient had 20-gauge left AC IV placed prehospital and another 16-gauge right AC IV was placed as well as additional 18-gauge IV in the left hand placed by nursing.  Nursing unable to obtain manual or automatic blood pressure on right upper extremity.  Left upper extremity automatic blood pressure revealed BP 90s over 50s.  Patient's feet and hands cool to touch and dusky in color.  Patient given small fluid challenge with 500 cc.  Bedside cardiac ultrasound did not show a large pleural effusion.  Portable chest x-ray showed stable cardiomegaly, slightly widened mediastinum but no significant pulmonary edema or effusion.  Patient noted to have significant ecchymosis throughout mid to lower abdomen.  Patient moving all 4 extremities without gross motor or sensory deficits.  Based on general ill appearance, patient significant cardiac history as well as his current complaints of severe pain radiating from his epigastrium to his back, concern for acute coronary syndrome versus aortic dissection versus recurrent cardiogenic shock.      History provided by:  Patient, EMS personnel and  medical records   used: No    Chest Pain  Associated symptoms: abdominal pain, back pain, diaphoresis, nausea and shortness of breath    Associated symptoms: no cough, no dizziness, no fever, no headache, no numbness, no palpitations, not vomiting and no weakness        Review of Systems   Constitutional:  Positive for diaphoresis. Negative for chills and fever.   HENT:  Negative for congestion, ear pain, rhinorrhea and sore throat.    Eyes:  Negative for photophobia, pain and visual disturbance.   Respiratory:  Positive for shortness of breath. Negative for cough and wheezing.    Cardiovascular:  Positive for chest pain and leg swelling. Negative for palpitations.   Gastrointestinal:  Positive for abdominal pain and nausea. Negative for abdominal distention, blood in stool, constipation, diarrhea and vomiting.   Genitourinary:  Negative for dysuria, flank pain, frequency and hematuria.   Musculoskeletal:  Positive for back pain. Negative for neck pain and neck stiffness.   Skin:  Negative for color change, pallor, rash and wound.   Allergic/Immunologic: Negative for immunocompromised state.   Neurological:  Negative for dizziness, syncope, weakness, light-headedness, numbness and headaches.   Hematological:  Negative for adenopathy. Bruises/bleeds easily.   Psychiatric/Behavioral:  Negative for confusion and decreased concentration.    All other systems reviewed and are negative.          Objective       ED Triage Vitals   Temp Pulse Blood Pressure Respirations SpO2 Patient Position - Orthostatic VS   -- 03/18/25 1432 03/18/25 1432 03/18/25 1432 03/18/25 1500 03/18/25 1432    56 (!) 94/45 22 (!) 87 % Sitting      Temp src Heart Rate Source BP Location FiO2 (%) Pain Score    -- 03/18/25 1432 03/18/25 1432 -- 03/18/25 1450     Monitor Right arm  10 - Worst Possible Pain      Vitals      Date and Time Temp Pulse SpO2 Resp BP Pain Score FACES Pain Rating User   03/18/25 1505 -- 56 -- -- 93/55 -- --  VMW   03/18/25 1500 -- 53 87 % 23 94/52 -- -- VMW   03/18/25 1450 -- -- -- -- -- 10 - Worst Possible Pain -- VMW   03/18/25 1432 -- 56 -- 22 94/45 -- -- VMW          Vitals:    03/18/25 1432 03/18/25 1500 03/18/25 1505   BP: (!) 94/45 94/52 93/55   BP Location: Right arm     Pulse: 56 (!) 53 56   Resp: 22 (!) 23    SpO2:  (!) 87%         Physical Exam  Vitals and nursing note reviewed.   Constitutional:       General: He is not in acute distress.     Appearance: He is well-developed. He is ill-appearing. He is not toxic-appearing or diaphoretic.   HENT:      Head: Normocephalic and atraumatic.      Right Ear: External ear normal.      Left Ear: External ear normal.      Nose: Nose normal.      Mouth/Throat:      Mouth: Mucous membranes are moist.      Pharynx: Oropharynx is clear.   Eyes:      Extraocular Movements: Extraocular movements intact.      Conjunctiva/sclera: Conjunctivae normal.      Pupils: Pupils are equal, round, and reactive to light.   Neck:      Vascular: No JVD.   Cardiovascular:      Rate and Rhythm: Regular rhythm. Bradycardia present.      Heart sounds: Normal heart sounds. No murmur heard.     No friction rub. No gallop.      Comments: Heart rate mildly bradycardic in the 50s.  Patient is weak 1+ radial and femoral pulses bilaterally.  Pulmonary:      Effort: No respiratory distress.      Breath sounds: Normal breath sounds. No stridor. No wheezing, rhonchi or rales.      Comments: Patient mildly tachypneic but no accessory muscle use or evidence of distress.  Lungs clear to auscultation bilaterally.  Abdominal:      General: There is no distension.      Palpations: Abdomen is soft.      Tenderness: There is abdominal tenderness. There is no guarding or rebound.      Comments: Significant tenderness in the epigastrium.  There is significant ecchymosis and bilateral mid to lower anterior abdominal wall.  Small reducible umbilical hernia present.   Musculoskeletal:         General: No  tenderness. Normal range of motion.      Cervical back: Normal range of motion and neck supple. No rigidity.      Right lower leg: Edema present.      Left lower leg: Edema present.      Comments: 2+ pitting edema bilateral feet, ankles and lower legs.   Skin:     General: Skin is dry.      Capillary Refill: Capillary refill takes more than 3 seconds.      Coloration: Skin is not pale.      Findings: Bruising present. No erythema or rash.      Comments: Ecchymosis over bilateral mid to lower anterior abdominal wall.  Patient generally has dusky bilateral hands and nails.  Bilateral hands and feet are cool to touch.   Neurological:      General: No focal deficit present.      Mental Status: He is alert and oriented to person, place, and time.      Sensory: No sensory deficit.      Motor: No weakness.   Psychiatric:         Behavior: Behavior normal.         Results Reviewed       Procedure Component Value Units Date/Time    Lactic acid, plasma (w/reflex if result > 2.0) [[id number]]  (Abnormal) Collected: 03/18/25 1457    Lab Status: Final result Specimen: Blood from Arm, Right Updated: 03/18/25 1548     LACTIC ACID 7.1 mmol/L     Narrative:      Result may be elevated if tourniquet was used during collection.    B-Type Natriuretic Peptide(BNP) [[id number]]  (Abnormal) Collected: 03/18/25 1457    Lab Status: Final result Specimen: Blood from Arm, Right Updated: 03/18/25 1530     BNP 1,450 pg/mL     HS Troponin 0hr (reflex protocol) [[id number]]  (Abnormal) Collected: 03/18/25 1457    Lab Status: Final result Specimen: Blood from Arm, Right Updated: 03/18/25 1528     hs TnI 0hr 908 ng/L     Protime-INR [[id number]]  (Abnormal) Collected: 03/18/25 1457    Lab Status: Final result Specimen: Blood from Arm, Right Updated: 03/18/25 1523     Protime 15.6 seconds      INR 1.17    Narrative:      INR Therapeutic Range    Indication                                             INR Range      Atrial Fibrillation                                                2.0-3.0  Hypercoagulable State                                    2.0.2.3  Left Ventricular Asist Device                            2.0-3.0  Mechanical Heart Valve                                  -    Aortic(with afib, MI, embolism, HF, LA enlargement,    and/or coagulopathy)                                     2.0-3.0 (2.5-3.5)     Mitral                                                             2.5-3.5  Prosthetic/Bioprosthetic Heart Valve               2.0-3.0  Venous thromboembolism (VTE: VT, PE        2.0-3.0    APTT [[id number]]  (Normal) Collected: 03/18/25 1457    Lab Status: Final result Specimen: Blood from Arm, Right Updated: 03/18/25 1523     PTT 28 seconds     Basic metabolic panel [[id number]]  (Abnormal) Collected: 03/18/25 1457    Lab Status: Final result Specimen: Blood from Arm, Right Updated: 03/18/25 1520     Sodium 120 mmol/L      Potassium 4.7 mmol/L      Chloride 84 mmol/L      CO2 16 mmol/L      ANION GAP 20 mmol/L      BUN 96 mg/dL      Creatinine 3.17 mg/dL      Glucose 170 mg/dL      Calcium 8.6 mg/dL      eGFR 20 ml/min/1.73sq m     Narrative:      National Kid[employer] Disease Foundation guidelines for Chronic Kidney Disease (CKD):     Stage 1 with normal or high GFR (GFR > 90 mL/min/1.73 square meters)    Stage 2 Mild CKD (GFR = 60-89 mL/min/1.73 square meters)    Stage 3A Moderate CKD (GFR = 45-59 mL/min/1.73 square meters)    Stage 3B Moderate CKD (GFR = 30-44 mL/min/1.73 square meters)    Stage 4 Severe CKD (GFR = 15-29 mL/min/1.73 square meters)    Stage 5 End Stage CKD (GFR <15 mL/min/1.73 square meters)  Note: GFR calculation is accurate only with a steady state creatinine    Hepatic function panel [[id number]]  (Abnormal) Collected: 03/18/25 1457    Lab Status: Final result Specimen: Blood from Arm, Right Updated: 03/18/25 1520     Total Bilirubin 1.57 mg/dL      Bilirubin, Direct 0.50 mg/dL      Alkaline Phosphatase 194 U/L      AST 46 U/L      ALT 49 U/L       Total Protein 7.1 g/dL      Albumin 4.0 g/dL     Magnesium [[id number]]  (Abnormal) Collected: 03/18/25 1457    Lab Status: Final result Specimen: Blood from Arm, Right Updated: 03/18/25 1520     Magnesium 2.9 mg/dL     Lipase [[id number]]  (Abnormal) Collected: 03/18/25 1457    Lab Status: Final result Specimen: Blood from Arm, Right Updated: 03/18/25 1520     Lipase 142 u/L     CBC and differential [[id number]]  (Abnormal) Collected: 03/18/25 1457    Lab Status: Final result Specimen: Blood from Arm, Right Updated: 03/18/25 1506     WBC 13.78 Thousand/uL      RBC 4.40 Million/uL      Hemoglobin 13.0 g/dL      Hematocrit 39.7 %      MCV 90 fL      MCH 29.5 pg      MCHC 32.7 g/dL      RDW 13.5 %      MPV 11.5 fL      Platelets 311 Thousands/uL      nRBC 0 /100 WBCs      Segmented % 78 %      Immature Grans % 2 %      Lymphocytes % 15 %      Monocytes % 5 %      Eosinophils Relative 0 %      Basophils Relative 0 %      Absolute Neutrophils 10.76 Thousands/µL      Absolute Immature Grans 0.20 Thousand/uL      Absolute Lymphocytes 2.07 Thousands/µL      Absolute Monocytes 0.71 Thousand/µL      Eosinophils Absolute 0.02 Thousand/µL      Basophils Absolute 0.02 Thousands/µL             CT chest abdomen pelvis wo contrast   Final Interpretation by Shivam Yousif MD (03/18 1616)      No acute intrathoracic abnormality.      Gallbladder wall thickening with hyperdense internal contents.   No discrete radiopaque calculi. No significant distention.   Right upper quadrant ultrasound is recommended if there is clinical concern for acute cholecystitis.      Unremarkable noncontrast CT appearance of the pancreas.   Noting elevated lipase on today's labs.      New infiltration of the left retroperitoneal fat in the region of the renal pelvis/left renal vein.   Uncertain etiology. Recommend correlation with urinalysis.      The study was marked in EPIC for immediate notification.               Workstation performed: [id number]          XR chest 1 view portable   ED Interpretation by Elli Jimenez DO (03/18 1626)   Cardiomegaly.  Slightly widened mediastinum.  No acute consolidation or significant pleural effusion or pulmonary edema.      Final Interpretation by Nick Skaggs MD (03/18 2034)      Stable findings without acute cardiopulmonary abnormalities.      Workstation performed: [id number]             ECG 12 Lead Documentation Only    Date/Time: 3/18/2025 2:37 PM    Performed by: Elli Jimenez DO  Authorized by: Elli Jimenez DO    ECG reviewed by me, the ED Provider: yes    Patient location:  ED  Rate:     ECG rate:  58    ECG rate assessment: bradycardic    Rhythm:     Rhythm comment:  Wide QRS rhythm  Ectopy:     Ectopy: none    QRS:     QRS axis:  Left    QRS intervals:  Wide  Conduction:     Conduction: abnormal      Abnormal conduction: complete RBBB, LAFB and bifascicular block    ST segments:     ST segments:  Non-specific  T waves:     T waves: normal    Q waves:     Q waves:  V1, V2, V3, V4, III, II, aVF, V5 and V6  Other findings:     Other findings: prolonged qTc interval    Intubation    Date/Time: 3/18/2025 3:30 PM    Performed by: Elli Jimenez DO  Authorized by: Elli Jimenez DO    Patient location:  ED  Other Assisting Provider: No    Consent:     Consent obtained:  Emergent situation    Consent given by:  Patient (Prior to cardiac arrest, discussed code status with patient and he was FULL CODE)  Universal protocol:     Patient identity confirmed:  Arm band and hospital-assigned identification number  Pre-procedure details:     Patient status:  Unresponsive    Pretreatment medications:  Etomidate    Paralytics:  Succinylcholine  Indications:     Indications for intubation: respiratory failure, airway protection and hypoxemia      Indications for intubation comment:  Cardiopulmonary arrest  Procedure details:     Preoxygenation:  Bag valve mask    CPR in progress:  yes      Intubation method:  Oral    Oral intubation technique:  Direct    Laryngoscope blade:  Mac 4    Tube size (mm):  7.5    Tube type:  Hi-lo    Number of attempts:  2    Ventilation between attempts: yes      Cricoid pressure: yes      Tube visualized through cords: yes    Placement assessment:     ETT to lip:  22    Tube secured with:  ETT larson    Breath sounds:  Equal    Placement verification: direct visualization, equal breath sounds and capnography      Chest x-ray findings: Unable to obtain post-intubation CXR due to active cardiac arrest/CPR in progress.    Critical Care Time Statement: Upon my evaluation, this patient had a high probability of imminent or life-threatening deterioration due to cardiogenic shock, hypoxic respiratory failure, which required my direct attention, intervention, and personal management.  I spent a total of 120 minutes directly providing critical care services, including interpretation of complex medical databases, evaluating for the presence of life-threatening injuries or illnesses, management of organ system failure(s) , complex medical decision making (to support/prevent further life-threatening deterioration)., interpretation of hemodynamic data, titration of vasoactive medications, titration of continuous IV medications (drips), and ventilator management. This time is exclusive of procedures, teaching, treating other patients, family meetings, and any prior time recorded by providers other than myself.       ED Medication and Procedure Management   Prior to Admission Medications   Prescriptions Last Dose Informant Patient Reported? Taking?   ESOMEPRAZOLE MAGNESIUM PO  Self Yes No   Sig: Take 1 tablet by mouth daily   allopurinol (ZYLOPRIM) 100 mg tablet  Self No No   Sig: Take 0.5 tablets (50 mg total) by mouth daily   Patient not taking: Reported on 3/13/2025   aspirin 81 mg chewable tablet  Self No No   Sig: Chew 1 tablet (81 mg total) daily   atorvastatin (LIPITOR)  80 mg tablet  Self No No   Sig: Take 1 tablet (80 mg total) by mouth daily with dinner   bumetanide (BUMEX) 1 mg tablet  Self No No   Sig: Take 3 tablets (3 mg total) by mouth 2 (two) times a day   clopidogrel (Plavix) 75 mg tablet  Self No No   Sig: Take 1 tablet (75 mg total) by mouth daily   hydrALAZINE (APRESOLINE) 10 mg tablet  Self No No   Sig: Take 2 tablets (20 mg total) by mouth every 8 (eight) hours   isosorbide dinitrate (ISORDIL) 10 mg tablet  Self No No   Sig: Take 1 tablet (10 mg total) by mouth 3 (three) times daily after meals   naloxone (NARCAN) 4 mg/0.1 mL nasal spray   No No   Sig: Administer 1 spray into a nostril. If no response after 2-3 minutes, give another dose in the other nostril using a new spray.   oxyCODONE (Roxicodone) 5 immediate release tablet   No No   Sig: Take 1-1.5 tablets (5-7.5 mg total) by mouth 3 (three) times a day as needed (air hunger/pain) Max Daily Amount: 22.5 mg   predniSONE 10 mg tablet   No No   Sig: Take 1 tablet (10 mg total) by mouth daily for 5 days      Facility-Administered Medications: None     Discharge Medication List as of 3/18/2025  7:15 PM        CONTINUE these medications which have NOT CHANGED    Details   allopurinol (ZYLOPRIM) 100 mg tablet Take 0.5 tablets (50 mg total) by mouth daily, Starting Wed 3/12/2025, Until Fri 4/11/2025, Normal      aspirin 81 mg chewable tablet Chew 1 tablet (81 mg total) daily, Starting Wed 3/12/2025, Until Fri 4/11/2025, Normal      atorvastatin (LIPITOR) 80 mg tablet Take 1 tablet (80 mg total) by mouth daily with dinner, Starting Tue 3/11/2025, Until Thu 4/10/2025, Normal      bumetanide (BUMEX) 1 mg tablet Take 3 tablets (3 mg total) by mouth 2 (two) times a day, Starting Tue 3/11/2025, Until Thu 4/10/2025, Normal      clopidogrel (Plavix) 75 mg tablet Take 1 tablet (75 mg total) by mouth daily, Starting Tue 3/11/2025, Until Thu 4/10/2025, Normal      ESOMEPRAZOLE MAGNESIUM PO Take 1 tablet by mouth daily, Historical  Med      hydrALAZINE (APRESOLINE) 10 mg tablet Take 2 tablets (20 mg total) by mouth every 8 (eight) hours, Starting Tue 3/11/2025, Until u 4/10/2025, Normal      isosorbide dinitrate (ISORDIL) 10 mg tablet Take 1 tablet (10 mg total) by mouth 3 (three) times daily after meals, Starting Tue 3/11/2025, Until Thu 4/10/2025, Normal      naloxone (NARCAN) 4 mg/0.1 mL nasal spray Administer 1 spray into a nostril. If no response after 2-3 minutes, give another dose in the other nostril using a new spray., Normal      oxyCODONE (Roxicodone) 5 immediate release tablet Take 1-1.5 tablets (5-7.5 mg total) by mouth 3 (three) times a day as needed (air hunger/pain) Max Daily Amount: 22.5 mg, Starting Tue 3/18/2025, Normal      predniSONE 10 mg tablet Take 1 tablet (10 mg total) by mouth daily for 5 days, Starting Tue 3/18/2025, Until Sun 3/23/2025, Normal           No discharge procedures on file.  ED SEPSIS DOCUMENTATION   Time reflects when diagnosis was documented in both MDM as applicable and the Disposition within this note       Time User Action Codes Description Comment    3/18/2025  4:02 PM Elli Jimenez [I46.9] Cardiopulmonary arrest (HCC)     3/18/2025  4:02 PM Elli Jimenez Add [I50.9] Multi-organ failure with heart failure (HCC)     3/18/2025  4:03 PM Elli Jimenez Add [N17.9,  N18.9] Acute on chronic renal failure  (HCC)     3/18/2025  4:03 PM Elli Jimenez Add [E87.20] Lactic acidosis     3/18/2025  4:04 PM Elli Jimenez [R09.02] Hypoxia     3/18/2025  4:04 PM Elli Jimenez Add [E87.1] Acute hyponatremia                  Elli Jimenez DO  03/18/25 0476

## 2025-03-18 NOTE — PROGRESS NOTES
On 3/18/25, at approximately 13:40, while sitting in the office at care now at [facility], I was made aware by Savita Rodriguez urgent care technician, that this patient was in acute distress upstairs in the medical office building for pulmonology appt on the 2nd floor.  Upon following her upstairs, found that the patient had ready been placed on supplemental oxygen at 4 L by medical assistants from pulmonology office.  Patient had signs of peripheral cyanosis, 911 had already been called EMS was in route.  Attempted to obtain a manual pulse as well as help to obtain a peripheral oxygen however finger pulse oximeter's were not giving an accurate oxygen level. Manual pulse taken at radial artery was thready but approximately 88.  The patient remained conscious and alert was no longer complaining of chest pain as was reported to me previously.  Within 5-7 minutes of going upstairs and encountering patient, EMS had arrived and it was agreed upon at that time between patient family and EMS staff that the patient will be brought to [facility] given his condition and given the proximity of that hospital.

## 2025-03-18 NOTE — TELEPHONE ENCOUNTER
Wife called the RX Refill Line. Message is being forwarded to the office.     Wife called to see if refill was sent to the pharmacy for the Prednisone. Wanted to make sure that it was sent to [facility] [address]    Please contact wife at [phone]

## 2025-03-18 NOTE — TELEPHONE ENCOUNTER
Wife called the RX Refill Line. Message is being forwarded to the office.     Wife called and states that patient tok the Oxycodone 2.5 mg and it wore off wiuth in an hour anf a halaf and states taht he was still short of breath and wanted to see if it needs to be increase. Was given a weeks worth from the pharmacy and was cutting the tablets from a half to another half to make it last. Would like new script sent to [facility] [address]    Please contact wife at  [phone] to let her know

## 2025-03-18 NOTE — TELEPHONE ENCOUNTER
Patient's spouse calling as she is waiting at [facility] for her  who had to be rushed out of his appointment with Dr. Montague via ambulance. She is on his communication consent form. I advised her that he is at our [facility] ED and provided the address.        Thank you.

## 2025-03-18 NOTE — CODE DOCUMENTATION
Code blue called to ED CT scanner. Upon arrival to the bedside patient ventilation is being assisted by BVM.  He quickly became unresponsive with agonal respirations.  Heart rhythm deteriorated to PEA.  CPR and ACLS algorithms initiated.  Patient intubated by Dr. Jimenez.  Please see separate procedure note.  Persistent PEA despite ACLS.  Please see code narrator for details of the resuscitative effort.  Rhythm changed to fine ventricular fibrillation with defibrillation x2.  There was brief return of thready bradycardic pulse with almost immediate deterioration back to PEA.  After 23 minutes of a resuscitation effort, there was no cardiac activity noted on POC cardiac ultrasound.  TOD 1543.

## 2025-03-18 NOTE — TELEPHONE ENCOUNTER
Acknowledged.  New prescription of oxycodone 5 mg tablets sent to [facility].  New prescription is 5-7.5 mg every 3 hours as needed for air hunger/pain.  Advised them to continue to watch for side effects and keep us updated.

## 2025-03-19 ENCOUNTER — TELEPHONE (OUTPATIENT)
Dept: CARDIOLOGY CLINIC | Facility: CLINIC | Age: 60
End: 2025-03-19

## 2025-03-19 ENCOUNTER — PATIENT OUTREACH (OUTPATIENT)
Dept: CARDIOLOGY CLINIC | Facility: CLINIC | Age: 60
End: 2025-03-19

## 2025-03-19 NOTE — PROGRESS NOTES
Received ADT that pt went to [facility] ED on 3/18/25 for SOB and chest pain. Then .     Sent IB Messg to inform LVAD Robert Slade.   Closed Social Work Episode with reason - .

## 2025-03-24 LAB
ATRIAL RATE: 36 BPM
QRS AXIS: -71 DEGREES
QRSD INTERVAL: 172 MS
QT INTERVAL: 544 MS
QTC INTERVAL: 534 MS
T WAVE AXIS: 50 DEGREES
VENTRICULAR RATE: 58 BPM

## (undated) DEVICE — CATH GUIDE LAUNCHER 6FR EBU 3.5

## (undated) DEVICE — PINNACLE INTRODUCER SHEATH: Brand: PINNACLE

## (undated) DEVICE — HI-TORQUE PILOT 50 GUIDE WIRE .014 STRAIGHT TIP 3.0 CM X 190 CM: Brand: HI-TORQUE PILOT

## (undated) DEVICE — CATH BAL PTCA EMERGE MR 3 X 15MM

## (undated) DEVICE — CATH DIAG 5FR .045 100CM FR4

## (undated) DEVICE — MICROPUNCTURE INTRODUCER SET SILHOUETTE TRANSITIONLESS PUSH-PLUS DESIGN - STIFFENED CANNULA WITH NITINOL WIRE GUIDE: Brand: MICROPUNCTURE

## (undated) DEVICE — CORONARY IMAGING CATHETER: Brand: OPTICROSS™ 6 HD

## (undated) DEVICE — PERCLOSE™ PROSTYLE™ SUTURE-MEDIATED CLOSURE AND REPAIR SYSTEM
Type: IMPLANTABLE DEVICE | Site: GROIN | Status: NON-FUNCTIONAL
Brand: PERCLOSE™ PROSTYLE™
Removed: 2025-03-07

## (undated) DEVICE — NC TREK NEO™ CORONARY DILATATION CATHETER 4.50 MM X 8 MM / RAPID-EXCHANGE: Brand: NC TREK NEO™

## (undated) DEVICE — RUNTHROUGH NS EXTRA FLOPPY PTCA GUIDEWIRE: Brand: RUNTHROUGH

## (undated) DEVICE — CATH DIAG 5FR IMPULSE 100CM FL4

## (undated) DEVICE — CATH F5 INF PIG145 110CM 6SH: Brand: INFINITI

## (undated) DEVICE — IMPLANTABLE DEVICE
Type: IMPLANTABLE DEVICE | Site: GROIN | Status: NON-FUNCTIONAL
Removed: 2025-03-07

## (undated) DEVICE — DGW .035 FC J3MM 150CM TEF: Brand: EMERALD